# Patient Record
Sex: FEMALE | Race: WHITE | NOT HISPANIC OR LATINO | URBAN - METROPOLITAN AREA
[De-identification: names, ages, dates, MRNs, and addresses within clinical notes are randomized per-mention and may not be internally consistent; named-entity substitution may affect disease eponyms.]

---

## 2017-11-09 PROBLEM — M51.369 DDD (DEGENERATIVE DISC DISEASE), LUMBAR: Status: ACTIVE | Noted: 2017-11-09

## 2017-11-09 PROBLEM — M51.360 LUMBAR DISCOGENIC PAIN SYNDROME: Status: ACTIVE | Noted: 2017-11-09

## 2024-01-05 ENCOUNTER — HOSPITAL ENCOUNTER (OUTPATIENT)
Dept: ULTRASOUND IMAGING | Facility: HOSPITAL | Age: 44
Discharge: HOME | End: 2024-01-05
Admitting: NURSE PRACTITIONER
Payer: COMMERCIAL

## 2024-01-05 DIAGNOSIS — Z15.09 LYNCH SYNDROME: ICD-10-CM

## 2024-01-05 DIAGNOSIS — Z85.3 HISTORY OF LEFT BREAST CANCER: ICD-10-CM

## 2024-01-05 DIAGNOSIS — R94.8 ABNORMAL PET SCAN, MEDIASTINUM: ICD-10-CM

## 2024-01-05 DIAGNOSIS — R74.8 ELEVATED LIVER ENZYMES: ICD-10-CM

## 2024-01-05 PROCEDURE — 76705 ECHO EXAM OF ABDOMEN: CPT

## 2024-01-09 ENCOUNTER — TELEPHONE (OUTPATIENT)
Dept: GYNECOLOGIC ONCOLOGY | Facility: CLINIC | Age: 44
End: 2024-01-09
Payer: COMMERCIAL

## 2024-01-09 DIAGNOSIS — R74.8 ELEVATED LIVER ENZYMES: ICD-10-CM

## 2024-01-09 DIAGNOSIS — K76.0 HEPATIC STEATOSIS: Primary | ICD-10-CM

## 2024-01-09 NOTE — TELEPHONE ENCOUNTER
----- Message from SHARONA Macias sent at 1/9/2024 10:31 AM EST -----  Please notify patient that her liver u/s showed mild diffuse steatosis (fatty liver changes). This could explain her elevated liver enzymes. Good news is that no liver masses were seen to indicate any cancerous processes. I am placing a referral to GI for further evaluation and management. Thanks!

## 2024-01-09 NOTE — TELEPHONE ENCOUNTER
RN reviewed results of liver ultrasound with patient per APRN request. APRN placed a referral to GI for patient for follow up as the ultrasound showed fatty liver but not cancerous causes for elevated liver enzymes. Patient verbalized understanding.

## 2024-02-14 ENCOUNTER — OFFICE VISIT (OUTPATIENT)
Dept: GASTROENTEROLOGY | Facility: CLINIC | Age: 44
End: 2024-02-14
Payer: COMMERCIAL

## 2024-02-14 VITALS
HEART RATE: 80 BPM | BODY MASS INDEX: 34.07 KG/M2 | WEIGHT: 212 LBS | SYSTOLIC BLOOD PRESSURE: 120 MMHG | HEIGHT: 66 IN | DIASTOLIC BLOOD PRESSURE: 80 MMHG

## 2024-02-14 DIAGNOSIS — R74.8 ELEVATED LIVER ENZYMES: Primary | ICD-10-CM

## 2024-02-14 DIAGNOSIS — K76.0 FATTY INFILTRATION OF LIVER: ICD-10-CM

## 2024-02-14 DIAGNOSIS — E66.9 CLASS 1 OBESITY WITH SERIOUS COMORBIDITY AND BODY MASS INDEX (BMI) OF 34.0 TO 34.9 IN ADULT, UNSPECIFIED OBESITY TYPE: ICD-10-CM

## 2024-02-14 DIAGNOSIS — Z15.09 LYNCH SYNDROME: ICD-10-CM

## 2024-02-26 ENCOUNTER — OFFICE VISIT (OUTPATIENT)
Dept: NEUROLOGY | Facility: CLINIC | Age: 44
End: 2024-02-26
Payer: COMMERCIAL

## 2024-02-26 VITALS
BODY MASS INDEX: 33.43 KG/M2 | HEART RATE: 80 BPM | HEIGHT: 66 IN | OXYGEN SATURATION: 96 % | WEIGHT: 208 LBS | DIASTOLIC BLOOD PRESSURE: 76 MMHG | SYSTOLIC BLOOD PRESSURE: 108 MMHG

## 2024-02-26 DIAGNOSIS — G43.019 INTRACTABLE MIGRAINE WITHOUT AURA AND WITHOUT STATUS MIGRAINOSUS: Primary | ICD-10-CM

## 2024-02-26 PROCEDURE — 99213 OFFICE O/P EST LOW 20 MIN: CPT | Performed by: PSYCHIATRY & NEUROLOGY

## 2024-02-26 RX ORDER — SUMATRIPTAN 100 MG/1
TABLET, FILM COATED ORAL
Qty: 8 TABLET | Refills: 5 | Status: SHIPPED | OUTPATIENT
Start: 2024-02-26

## 2024-02-26 RX ORDER — NORTRIPTYLINE HYDROCHLORIDE 25 MG/1
25 CAPSULE ORAL NIGHTLY
Qty: 30 CAPSULE | Refills: 6 | Status: SHIPPED | OUTPATIENT
Start: 2024-02-26 | End: 2025-02-25

## 2024-02-26 NOTE — PROGRESS NOTES
Subjective:    CC: Edna Motley is seen today for headaches    HPI:  Current visit-patient states that she is still having about 8-15 headaches a month.  She has been having a lot of difficulties getting her Aimovig.  Had to use up her $5 coupon last month (only gets to a year).  Also continues to take nortriptyline 25 mg nightly and Imitrex as needed.    at her last visit I had given her a sample of Trudhesa for her month-long headache which helped.  Her headaches were well controlled up until a week ago as she has been sick with a sore throat.  Was  also unable to get her Aimovig shot due to insurance issues and is 1 week late.  Is compliant with nortriptyline 25 mg nightly which has improved her sleep.  She still takes 7 to 8 tablets of Imitrex a month.  Some of her headaches start off with neck pain but overall her myalgias have improved since she got chemo several years ago.      Last visit-patient states that she was doing extremely well in terms of her headaches up until a few weeks ago when she started to have a right-sided headache with pain behind her right eye.  Since then she has had a continuous headache despite taking sumatriptan.  She continues to take Aimovig shots monthly and nortriptyline 25 mg nightly.  Usually sleeps well at night except when she has hot flashes.     Last visit-patient started taking Aimovig 140 mg subcu q. monthly (which was improved however her co-pay remains high due to a high deductible).  She also switch from amitriptyline to nortriptyline 25 mg nightly.  Since making these changes her headaches have improved.  She also wakes up feeling refreshed.  She still has about 6-8 breakthrough headaches a month for which she takes Imitrex 100 mg.     Last visit-patient's Ajovy was also denied by insurance therefore we had switched her to Emgality however that was denied as well and we have been giving her samples each month.  She states that her headache frequency has worsened  and she is having about 6-10 severe headaches a month for which she takes Imitrex.  Continues to take amitriptyline 10 mg nightly as the higher dose was making her groggy during the daytime.  She also wakes up with a headache occasionally in the back of her head that can radiate to the front but it usually goes away with movement.    Last visit-patient has now started taking Ajovy as her Aimovig was denied again by her insurance.  She has only taken 1 shot of Ajovy thus far and her headache frequency has already improved to about 4-5 headaches last month.  She also continues to take amitriptyline 10 mg nightly which helps with her sleep.  She does wake up feeling slightly groggy.  Also continues to take Imitrex as needed.  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      Last visit-patient  had her MRI brain with and without contrast that was normal.  She also had a CT chest and CT abdomen but did not show any lesions/adenopathy.  Has been cancer free.  With regards to her headaches she was doing extremely well after she started amitriptyline 10 mg nightly however her insurance stopped paying for her Aimovig therefore she has not been able to get her injections for the past 2 months.  She is currently having over 8 headaches a month for which she takes Imitrex 100 mg.  Of note-I personally reviewed her MRI brain with and without contrast    Last visit- patient last saw me in June 2020.  She has been getting Aimovig monthly shots since then.  She states that initially the Aimovig helped tremendously and she was hardly having any headaches but over the past 4 months her headaches have again worsened in severity and frequency.  In the past when she has had about 15 or more headaches.  Her OB/GYN is also repeating a PET scan to see if she has any recurrence of cancer or metastasis (had double mastectomy for breast cancer 5 years ago and has also had hysterectomy with oophorectomy  several years ago for endometriosis).  She states she has also been having difficulty sleeping at night and is back to taking ibuprofen PM as melatonin was not helping.  Takes sumatriptan 100 mg for abortive treatment of her headaches but is running out now.    Last visit-since the last visit patient states that her headaches improved drastically after she got her Aimovig injection at her clinic.  She also reduced her over-the-counter medications and completed her dexamethasone taper pack.  However she was not able to get the Aimovig from her pharmacy and has not had any more injections as it was not approved by her insurance.  In the past month she had about 7 headaches for which she took either ibuprofen or Imitrex.    Last visit-this is a patient previously seen by Dr. Butt for headaches.  Patient also has a history of Massey syndrome with breast cancer and has had bilateral mastectomies followed by chemotherapy about 2 years ago.  At her last visit Dr. Ya had started her on amitriptyline however patient does not remember starting it.  She is currently on Effexor XR 75 mg at night for her headaches and hot flashes however it only controls the hot flashes but not the headaches.  In the past she has also tried Topamax and zonisamide both of which either caused cognitive side effects or did not help.  Patient states that she is currently getting a headache almost every day on waking up in the morning.  She also snores a little at night and is tired throughout the day.  The headache is mainly in the front and moves from one side to the other along with occasional blurred vision, photophobia and phonophobia but no nausea or vomiting.  She takes either Imitrex, Aleve or ibuprofen almost every day for the headaches.  Of note-I reviewed Dr. Butt's notes as follows-    Pt previously seen for Massey Syndrome, and when reassessed 10/7/15, it had been nearly two years since last evaluation. Family members  with colon, breast, uterine cancer, so tested and positive.      Also has migraines. Usually unilateral, either side, throbbing pain. No aura. Sometimes nausea, rarely vomiting, and has photophobia, prefers to go to bed with them. Has been treated with TPM, and uses imitrex, but uses ibuprofen or aleve first. Near daily. Whole adult life, at least 3-6/week. Worsening over time. Rarely a day or two without. TPM affected mood but was some help. No other preventives. Planned tx with ZNS.  2/16: off hormone patch for almost a month for upcoming evaluation, and having constant migraines, imitrex pill not working. Was doing well up until then. Has been taking the ZNS 50mg 4 at hs. No change in character of HA.  2/17: Has been dx with breast cancer, off hormone tx permanently. Dr Browne' notes reviewed.  Had bilateral mastectomy, couple more chemotherapy treatments, then planning reconstructive surgery. Has night sweats and hot flashes.  Had bad headaches for a while, but headaches now a lot better. Getting brief headaches couple times/week, that resolve easily with OTC med. If misses a dose of TPM, or doesn't have one can pop/day will get a headache. Same low back pain she's always had. Can radiate into right leg, not new.   3/18: has been off TPM a while, taking no daily prescription meds. Had a lot of migraines in January. Using imitrex as needed, continues to work well. Had never previously gone a month without migraines. Back pain still a problem, has seen various doctors for this. No focal weakness, except leg with pain can give way. Back is really troublesome. Sleeps poorly partly due to hot flashes.   Today: last breast cancer surgery was in January. Almost out of imitrex. Sometimes having to take second pill. Ran out of injections, which work well for a really bad migraine. Taking at least 2 pills/week. Didn't really like TPM due to cognitive side effects. Lots of hot flashes. Sleeps poorly at night, takes OTC  meds. No new weakness, numbness, vision changes, gait problems, difficulty with speech or swallowing.     The following portions of the patient's history were reviewed today and updated as of 02/27/2020  : allergies, current medications, past family history, past medical history, past social history, past surgical history and problem list  These document will be scanned to patient's chart.      Current Outpatient Medications:     nortriptyline (Pamelor) 25 MG capsule, Take 1 capsule by mouth Every Night., Disp: 30 capsule, Rfl: 6    SUMAtriptan (IMITREX) 100 MG tablet, TAKE 1 TABLET BY MOUTH AT THE ONSET OF MIGRAINE.  MAY REPEAT AFTER 2 HOURS, MAX 2 TABS PER 24 HOURS, Disp: 8 tablet, Rfl: 5    cyclobenzaprine (FLEXERIL) 5 MG tablet, Take 1 tablet by mouth At Night As Needed for Muscle Spasms., Disp: 30 tablet, Rfl: 3    Erenumab-aooe (AIMOVIG) 140 MG/ML auto-injector, Inject 1 mL under the skin into the appropriate area as directed Every 30 (Thirty) Days., Disp: 1 mL, Rfl: 11    estradiol (ESTRACE VAGINAL) 0.1 MG/GM vaginal cream, Insert 1/2 applicator per 2-3x per week at night, Disp: 42.5 g, Rfl: 5    Multiple Vitamins-Minerals (HAIR SKIN AND NAILS FORMULA) tablet, Take  by mouth Daily., Disp: , Rfl:     polyethylene glycol (MIRALAX) packet, Take 17 g by mouth As Needed., Disp: , Rfl:     venlafaxine (EFFEXOR) 75 MG tablet, Take 1 tablet by mouth 2 (Two) Times a Day., Disp: 60 tablet, Rfl: 11   Past Medical History:   Diagnosis Date    Cluster headache All my life    It’s like it get better with medicine but then comes back    Constipation     CTS (carpal tunnel syndrome) 2020    Depression Off and on    When I’m hurting more I get more down    Difficulty walking Worse back leg etc    Mostly mornings when I wake up or after sitting a long time    Endometriosis     When uterus removed was told had this    Generalized headaches     Headache, tension-type Everyday    Heart murmur     Hyperlipidemia 01/26/2023     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    Massey syndrome     Malignant neoplasm of lower-outer quadrant of left female breast 09/28/2016    Migraines 09/21/2016    Multiple gestation     I have a 13 and 15 year old    Osteoarthritis I think I have this    Pain  I believe checked    Osteoporosis Pain everywhere    Pelvic pain 09/21/2016    Wears glasses     Well woman exam with routine gynecological exam 10/25/2017      Past Surgical History:   Procedure Laterality Date    BREAST BIOPSY  9/22/16    Result cancer    BREAST RECONSTRUCTION, BREAST TISSUE EXPANDER INSERTION Bilateral 11/01/2016    Procedure: BILATERAL BREAST IMMEDIATE RECONSTRUCTION, BREAST TISSUE EXPANDER INSERTION;  Surgeon: Kenny Massey MD;  Location:  [facility];  Service:     BREAST RECONSTRUCTION, BREAST TISSUE EXPANDER REMOVAL, IMPLANT INSERTION Left 06/09/2017    Procedure: PLACEMENT OF LEFT RECONSTRUCTED BREAST TISSUE EXPANDER ;  Surgeon: Kenny Massey MD;  Location:  [facility];  Service:     BREAST SURGERY      COLONOSCOPY  10/14/2016    DILATION AND CURETTAGE, DIAGNOSTIC / THERAPEUTIC      x2    HYSTERECTOMY      MASTECTOMY W/ SENTINEL NODE BIOPSY Bilateral 11/01/2016    Procedure: BILATERAL BREAST MASTECTOMY WITH LEFT SENTINEL NODE BIOPSY POSS AXILLARY NODE DISECTION;  Surgeon: Jose F Johnson MD;  Location:  [facility];  Service:     OOPHORECTOMY      TOTAL ABDOMINAL HYSTERECTOMY WITH SALPINGO OOPHORECTOMY  09/01/2011    Endometriosis    UPPER GASTROINTESTINAL ENDOSCOPY  11/19/2014    VENOUS ACCESS DEVICE (PORT) REMOVAL Right 03/17/2017    WISDOM TOOTH EXTRACTION  08/01/1999    3 teeth removed      Family History   Problem Relation Age of Onset    Breast cancer Mother         Mom    Uterine cancer Mother         Mom    Migraines Mother         All the time from what I remember    Breast cancer Maternal Grandmother         Great grandmother    Colon cancer Maternal Grandmother         Believe she went to [facility]    Alzheimer's disease Other      "Cancer Other     Prostate cancer Paternal Grandfather         Went to [facility]      Social History     Socioeconomic History    Marital status:    Tobacco Use    Smoking status: Never     Passive exposure: Never    Smokeless tobacco: Never    Tobacco comments:     None ever   Vaping Use    Vaping Use: Never used   Substance and Sexual Activity    Alcohol use: Yes     Comment: Very seldom 3-4 times a year    Drug use: Never    Sexual activity: Yes     Partners: Male     Birth control/protection: Post-menopausal, None, Hysterectomy     Comment: Hurts / no hormones / we barely try anymore     Review of Systems   Neurological: Positive for headache.   All other systems reviewed and are negative.      Objective:    /76   Pulse 80   Ht 167.6 cm (66\")   Wt 94.3 kg (208 lb)   SpO2 96%   BMI 33.57 kg/m²     Neurology Exam: Reviewed and remains unchanged    General apperance: NAD.     Mental status: Alert, awake and oriented to time place and person.    Recent and Remote memory: Intact.    Attention span and Concentration: Normal.     Language and Speech: Intact- No dysarthria.    Fluency, Naming , Repitition and Comprehension:  Intact    Cranial Nerves:   CN II: Visual fields are full. Intact., Pupils - ALTHEA  CN III, IV and VI: Extraocular movements are intact. Normal saccades.   CN V: Facial sensation is intact.   CN VII: Muscles of facial expression reveal no asymmetry. Intact.   CN VIII: Hearing is intact. Whispered voice intact.   CN IX and X: Palate elevates symmetrically. Intact  CN XI: Shoulder shrug is intact.   CN XII: Tongue is midline without evidence of atrophy or fasciculation.     Ophthalmoscopic exam of optic disc- deferred    Motor:  Right UE muscle strength 5/5. Normal tone.     Left UE muscle strength 5/5. Normal tone.      Right LE muscle strength5/5. Normal tone.     Left LE muscle strength 5/5. Normal tone.      Sensory: Normal light touch, vibration and pinprick sensation " bilaterally.    DTRs: 2+ bilaterally in upper and lower extremities.    Babinski: Negative bilaterally.    Co-ordination: Normal finger-to-nose, heel to shin B/L.    Rhomberg: Negative.    Gait: Normal.    Cardiovascular: Regular rate and rhythm without murmur, gallop or rub.    Assessment and Plan:  1. Intractable migraine without aura and without status migrainosus  Patient most likely has a combination of migraine, tension type and medication rebound headaches.  In the past has tried Topamax, Botox, zonisamide, Effexor and amitriptyline.  Emgality and Ajovy were not approved  MRI brain was normal   I have told her to take her Aimovig this month but then stop it as she has had difficulties getting it from her insurance and it has not helped much either.  I will try to get Botox approved as she tried it in the past and found it helpful she should continue nortriptyline 25 mg nightly and sumatriptan as needed      Return in about 6 weeks (around 4/8/2024).         Sara Rocha MD

## 2024-03-11 ENCOUNTER — TELEPHONE (OUTPATIENT)
Dept: NEUROLOGY | Facility: CLINIC | Age: 44
End: 2024-03-11
Payer: COMMERCIAL

## 2024-03-11 NOTE — TELEPHONE ENCOUNTER
Provider: JULISSA    Caller: SUSANA    Phone Number: [phone]     Reason for Call: PT CALLED AND IS NEED TO RESCHEDULE BOTOX APPT ON 04/12/24.    THANK YOU

## 2024-04-19 ENCOUNTER — PROCEDURE VISIT (OUTPATIENT)
Dept: NEUROLOGY | Facility: CLINIC | Age: 44
End: 2024-04-19
Payer: COMMERCIAL

## 2024-04-19 VITALS — DIASTOLIC BLOOD PRESSURE: 89 MMHG | SYSTOLIC BLOOD PRESSURE: 130 MMHG

## 2024-04-19 DIAGNOSIS — G43.019 INTRACTABLE MIGRAINE WITHOUT AURA AND WITHOUT STATUS MIGRAINOSUS: Primary | ICD-10-CM

## 2024-04-19 RX ORDER — SUMATRIPTAN 100 MG/1
TABLET, FILM COATED ORAL
Qty: 8 TABLET | Refills: 5 | Status: SHIPPED | OUTPATIENT
Start: 2024-04-19

## 2024-04-19 NOTE — PROGRESS NOTES
Botox Procedure Note-    Current headache frequency: 20-25__ headaches days / month (about one a week).     The risks and benefits were discussed with the patient. The patient was given the opportunity to ask questions. Informed consent form was signed.     Onabotulinumtoxin A was reconstituted with 0.9% normal saline. All injections sites were prepped with alcohol swab. Approximately 5 units of botox were injected into each of the following sites  as per routine migraine botox injection PREEMPT protocol:  (2 injections), procerus (1 injection), frontalis (4 injections), temporalis (8 injections), occipitalis (6 injections) cervical, upper cervical paraspinals (4 injections), and trapezius (6 injections) for a total of 31 sites.  The patient tolerated the procedure well. There were no immediate complications. The patient will follow up in approximately 12 weeks for her next injection.     Total amount of onabotulinumtoxin A injected was 155 units with 45 units wasted.     Additional notes: Patient is here for her first Botox cycle.  She stopped taking Aimovig shots (as they were not helping and also due to insurance issues).  She does continue to take nortriptyline 25 mg nightly however her headaches have been extremely bad in the past month as she has had a near daily headache.  Sleeps well at night though with nortriptyline and is reluctant to increase the dose yet.  Continues to take sumatriptan as needed.

## 2024-04-22 ENCOUNTER — PRIOR AUTHORIZATION (OUTPATIENT)
Dept: NEUROLOGY | Facility: CLINIC | Age: 44
End: 2024-04-22
Payer: COMMERCIAL

## 2024-04-22 NOTE — TELEPHONE ENCOUNTER
DONE AUTH ONLINE THROUGH [employer] [place]    BOTOX 200 UNITS      [employer]UR[employer]  [employer] [place]  [id number]    APPROVED  AUTH:[id number]  DATES:04/17/2024-04/17/2025    BOTOX WILL BE THROUGH MEDICAL BENEFIT       BUY AND BILL

## 2024-05-06 ENCOUNTER — TELEPHONE (OUTPATIENT)
Dept: NEUROLOGY | Facility: CLINIC | Age: 44
End: 2024-05-06
Payer: COMMERCIAL

## 2024-05-07 RX ORDER — METHYLPREDNISOLONE 4 MG/1
TABLET ORAL
Qty: 1 EACH | Refills: 0 | Status: SHIPPED | OUTPATIENT
Start: 2024-05-07

## 2024-05-21 ENCOUNTER — SPECIALTY PHARMACY (OUTPATIENT)
Dept: NEUROLOGY | Facility: CLINIC | Age: 44
End: 2024-05-21
Payer: COMMERCIAL

## 2024-06-11 RX ORDER — ZAVEGEPANT 10 MG/.1ML
10 SPRAY NASAL AS NEEDED
Qty: 5 EACH | Refills: 3 | Status: SHIPPED | OUTPATIENT
Start: 2024-06-11

## 2024-06-11 RX ORDER — METHYLPREDNISOLONE 4 MG/1
TABLET ORAL
Qty: 1 EACH | Refills: 0 | Status: SHIPPED | OUTPATIENT
Start: 2024-06-11

## 2024-06-17 ENCOUNTER — TELEPHONE (OUTPATIENT)
Dept: NEUROLOGY | Facility: CLINIC | Age: 44
End: 2024-06-17
Payer: COMMERCIAL

## 2024-06-17 NOTE — TELEPHONE ENCOUNTER
Provider: DR. COSTA    Caller: Edna Motley    Relationship to Patient: Self    Pharmacy: [id number] - [place], [place] - [address] [facility] [address] - [phone] [facility] [phone] FX    Phone Number: [phone]    Reason for Call: PT STATES HER MIGRAINE IS STILL ONGOING; NASAL SPRAY & STEROID PACK HAVE NOT PROVIDED ANY RELIEF.    When was the patient last seen: 4/19/2024 FOR BOTOX    When is the patient's next appointment: NO FUTURE CONSULT VISITS; BOTOX ON 7/19/2024    When did it start: A LITTLE OVER A WEEK AGO    Where is it located: FOREHEAD & TEMPLES (BILATERAL); NECK REGION, AS WELL    Characteristics of symptom/severity: SHARP, SHOOTING; RATES A 10/10 ON PAIN SCALE.    Timing- Is it constant or intermittent: CONSTANT  What therapies/medications have you tried: SUMATRIPTAN HELPS TO ALLEVIATE THE MIGRAINE TEMPORARILY; MEDROL DOSE PACK AND ZAVZPRET HAVE NO HELPED AT ALL TO ALLEVIATE THE PAIN.    PLEASE REVIEW AND ADVISE.

## 2024-06-18 RX ORDER — DEXAMETHASONE 1 MG
TABLET ORAL
Qty: 20 TABLET | Refills: 0 | Status: SHIPPED | OUTPATIENT
Start: 2024-06-18

## 2024-06-26 ENCOUNTER — HOSPITAL ENCOUNTER (INPATIENT)
Facility: HOSPITAL | Age: 44
LOS: 4 days | Discharge: HOME | DRG: 299 | End: 2024-06-30
Attending: EMERGENCY MEDICINE | Admitting: INTERNAL MEDICINE
Payer: COMMERCIAL

## 2024-06-26 ENCOUNTER — APPOINTMENT (OUTPATIENT)
Dept: CT IMAGING | Facility: HOSPITAL | Age: 44
DRG: 299 | End: 2024-06-26
Payer: COMMERCIAL

## 2024-06-26 ENCOUNTER — APPOINTMENT (OUTPATIENT)
Dept: GENERAL RADIOLOGY | Facility: HOSPITAL | Age: 44
DRG: 299 | End: 2024-06-26
Payer: COMMERCIAL

## 2024-06-26 ENCOUNTER — APPOINTMENT (OUTPATIENT)
Dept: MRI IMAGING | Facility: HOSPITAL | Age: 44
DRG: 299 | End: 2024-06-26
Payer: COMMERCIAL

## 2024-06-26 DIAGNOSIS — I77.74 DISSECTION OF EXTRACRANIAL VERTEBRAL ARTERY: Primary | ICD-10-CM

## 2024-06-26 LAB
ALBUMIN SERPL-MCNC: 4.1 G/DL (ref 3.5–5.2)
ALBUMIN/GLOB SERPL: 1.2 G/DL
ALP SERPL-CCNC: 90 U/L (ref 39–117)
ALT SERPL W P-5'-P-CCNC: 29 U/L (ref 1–33)
ANION GAP SERPL CALCULATED.3IONS-SCNC: 11 MMOL/L (ref 5–15)
APTT PPP: 22.2 SECONDS (ref 60–90)
AST SERPL-CCNC: 18 U/L (ref 1–32)
BACTERIA UR QL AUTO: NORMAL /HPF
BASOPHILS # BLD AUTO: 0.03 10*3/MM3 (ref 0–0.2)
BASOPHILS NFR BLD AUTO: 0.3 % (ref 0–1.5)
BILIRUB SERPL-MCNC: 0.5 MG/DL (ref 0–1.2)
BILIRUB UR QL STRIP: NEGATIVE
BUN SERPL-MCNC: 13 MG/DL (ref 6–20)
BUN/CREAT SERPL: 14.8 (ref 7–25)
CALCIUM SPEC-SCNC: 9.3 MG/DL (ref 8.6–10.5)
CHLORIDE SERPL-SCNC: 100 MMOL/L (ref 98–107)
CLARITY UR: CLEAR
CO2 SERPL-SCNC: 27 MMOL/L (ref 22–29)
COLOR UR: YELLOW
CREAT SERPL-MCNC: 0.88 MG/DL (ref 0.57–1)
DEPRECATED RDW RBC AUTO: 39.1 FL (ref 37–54)
EGFRCR SERPLBLD CKD-EPI 2021: 83.7 ML/MIN/1.73
EOSINOPHIL # BLD AUTO: 0.03 10*3/MM3 (ref 0–0.4)
EOSINOPHIL NFR BLD AUTO: 0.3 % (ref 0.3–6.2)
ERYTHROCYTE [DISTWIDTH] IN BLOOD BY AUTOMATED COUNT: 11.9 % (ref 12.3–15.4)
GLOBULIN UR ELPH-MCNC: 3.4 GM/DL
GLUCOSE BLDC GLUCOMTR-MCNC: 94 MG/DL (ref 70–130)
GLUCOSE SERPL-MCNC: 120 MG/DL (ref 65–99)
GLUCOSE UR STRIP-MCNC: NEGATIVE MG/DL
HCT VFR BLD AUTO: 46.5 % (ref 34–46.6)
HGB BLD-MCNC: 15.9 G/DL (ref 12–15.9)
HGB UR QL STRIP.AUTO: NEGATIVE
HOLD SPECIMEN: NORMAL
HYALINE CASTS UR QL AUTO: NORMAL /LPF
IMM GRANULOCYTES # BLD AUTO: 0.08 10*3/MM3 (ref 0–0.05)
IMM GRANULOCYTES NFR BLD AUTO: 0.8 % (ref 0–0.5)
INR PPP: 0.96 (ref 0.89–1.12)
KETONES UR QL STRIP: NEGATIVE
LEUKOCYTE ESTERASE UR QL STRIP.AUTO: NEGATIVE
LYMPHOCYTES # BLD AUTO: 2.45 10*3/MM3 (ref 0.7–3.1)
LYMPHOCYTES NFR BLD AUTO: 23 % (ref 19.6–45.3)
MAGNESIUM SERPL-MCNC: 2.6 MG/DL (ref 1.6–2.6)
MCH RBC QN AUTO: 31.1 PG (ref 26.6–33)
MCHC RBC AUTO-ENTMCNC: 34.2 G/DL (ref 31.5–35.7)
MCV RBC AUTO: 90.8 FL (ref 79–97)
MONOCYTES # BLD AUTO: 0.73 10*3/MM3 (ref 0.1–0.9)
MONOCYTES NFR BLD AUTO: 6.9 % (ref 5–12)
NEUTROPHILS NFR BLD AUTO: 68.7 % (ref 42.7–76)
NEUTROPHILS NFR BLD AUTO: 7.31 10*3/MM3 (ref 1.7–7)
NITRITE UR QL STRIP: NEGATIVE
NRBC BLD AUTO-RTO: 0 /100 WBC (ref 0–0.2)
PH UR STRIP.AUTO: 6 [PH] (ref 5–8)
PLATELET # BLD AUTO: 254 10*3/MM3 (ref 140–450)
PMV BLD AUTO: 10.6 FL (ref 6–12)
POTASSIUM SERPL-SCNC: 3.8 MMOL/L (ref 3.5–5.2)
PROT SERPL-MCNC: 7.5 G/DL (ref 6–8.5)
PROT UR QL STRIP: ABNORMAL
PROTHROMBIN TIME: 12.9 SECONDS (ref 12.2–14.5)
QT INTERVAL: 410 MS
QTC INTERVAL: 426 MS
RBC # BLD AUTO: 5.12 10*6/MM3 (ref 3.77–5.28)
RBC # UR STRIP: NORMAL /HPF
REF LAB TEST METHOD: NORMAL
SODIUM SERPL-SCNC: 138 MMOL/L (ref 136–145)
SP GR UR STRIP: 1.02 (ref 1–1.03)
SQUAMOUS #/AREA URNS HPF: NORMAL /HPF
TROPONIN T SERPL HS-MCNC: <6 NG/L
TSH SERPL DL<=0.05 MIU/L-ACNC: 1.51 UIU/ML (ref 0.27–4.2)
UFH PPP CHRO-ACNC: 0.1 IU/ML (ref 0.3–0.7)
UROBILINOGEN UR QL STRIP: ABNORMAL
WBC # UR STRIP: NORMAL /HPF
WBC NRBC COR # BLD AUTO: 10.63 10*3/MM3 (ref 3.4–10.8)
WHOLE BLOOD HOLD COAG: NORMAL
WHOLE BLOOD HOLD SPECIMEN: NORMAL

## 2024-06-26 PROCEDURE — 25010000002 PROCHLORPERAZINE 10 MG/2ML SOLUTION: Performed by: EMERGENCY MEDICINE

## 2024-06-26 PROCEDURE — 93005 ELECTROCARDIOGRAM TRACING: CPT | Performed by: EMERGENCY MEDICINE

## 2024-06-26 PROCEDURE — 84484 ASSAY OF TROPONIN QUANT: CPT | Performed by: EMERGENCY MEDICINE

## 2024-06-26 PROCEDURE — 99291 CRITICAL CARE FIRST HOUR: CPT

## 2024-06-26 PROCEDURE — 99222 1ST HOSP IP/OBS MODERATE 55: CPT | Performed by: NURSE PRACTITIONER

## 2024-06-26 PROCEDURE — 85520 HEPARIN ASSAY: CPT | Performed by: EMERGENCY MEDICINE

## 2024-06-26 PROCEDURE — 25510000001 IOPAMIDOL PER 1 ML: Performed by: EMERGENCY MEDICINE

## 2024-06-26 PROCEDURE — 70496 CT ANGIOGRAPHY HEAD: CPT

## 2024-06-26 PROCEDURE — 81001 URINALYSIS AUTO W/SCOPE: CPT | Performed by: EMERGENCY MEDICINE

## 2024-06-26 PROCEDURE — 83735 ASSAY OF MAGNESIUM: CPT | Performed by: EMERGENCY MEDICINE

## 2024-06-26 PROCEDURE — 71045 X-RAY EXAM CHEST 1 VIEW: CPT

## 2024-06-26 PROCEDURE — 70498 CT ANGIOGRAPHY NECK: CPT

## 2024-06-26 PROCEDURE — 93005 ELECTROCARDIOGRAM TRACING: CPT

## 2024-06-26 PROCEDURE — 85610 PROTHROMBIN TIME: CPT | Performed by: EMERGENCY MEDICINE

## 2024-06-26 PROCEDURE — 25010000002 HEPARIN (PORCINE) 25000-0.45 UT/250ML-% SOLUTION: Performed by: EMERGENCY MEDICINE

## 2024-06-26 PROCEDURE — 70450 CT HEAD/BRAIN W/O DYE: CPT

## 2024-06-26 PROCEDURE — 84295 ASSAY OF SERUM SODIUM: CPT | Performed by: NURSE PRACTITIONER

## 2024-06-26 PROCEDURE — 99291 CRITICAL CARE FIRST HOUR: CPT | Performed by: INTERNAL MEDICINE

## 2024-06-26 PROCEDURE — 80050 GENERAL HEALTH PANEL: CPT | Performed by: EMERGENCY MEDICINE

## 2024-06-26 PROCEDURE — 25810000003 SODIUM CHLORIDE 0.9 % SOLUTION: Performed by: EMERGENCY MEDICINE

## 2024-06-26 PROCEDURE — 25010000002 MAGNESIUM SULFATE 2 GM/50ML SOLUTION: Performed by: NURSE PRACTITIONER

## 2024-06-26 PROCEDURE — 85730 THROMBOPLASTIN TIME PARTIAL: CPT | Performed by: EMERGENCY MEDICINE

## 2024-06-26 PROCEDURE — 70551 MRI BRAIN STEM W/O DYE: CPT

## 2024-06-26 PROCEDURE — 82948 REAGENT STRIP/BLOOD GLUCOSE: CPT

## 2024-06-26 PROCEDURE — 36415 COLL VENOUS BLD VENIPUNCTURE: CPT

## 2024-06-26 RX ORDER — BISACODYL 5 MG/1
5 TABLET, DELAYED RELEASE ORAL DAILY PRN
Status: DISCONTINUED | OUTPATIENT
Start: 2024-06-26 | End: 2024-06-30 | Stop reason: HOSPADM

## 2024-06-26 RX ORDER — SODIUM CHLORIDE 0.9 % (FLUSH) 0.9 %
10 SYRINGE (ML) INJECTION AS NEEDED
Status: DISCONTINUED | OUTPATIENT
Start: 2024-06-26 | End: 2024-06-27

## 2024-06-26 RX ORDER — HEPARIN SODIUM 1000 [USP'U]/ML
2000 INJECTION, SOLUTION INTRAVENOUS; SUBCUTANEOUS AS NEEDED
Status: DISCONTINUED | OUTPATIENT
Start: 2024-06-26 | End: 2024-06-26

## 2024-06-26 RX ORDER — POLYETHYLENE GLYCOL 3350 17 G/17G
17 POWDER, FOR SOLUTION ORAL DAILY PRN
Status: DISCONTINUED | OUTPATIENT
Start: 2024-06-26 | End: 2024-06-30 | Stop reason: HOSPADM

## 2024-06-26 RX ORDER — AMOXICILLIN 250 MG
2 CAPSULE ORAL 2 TIMES DAILY
Status: DISCONTINUED | OUTPATIENT
Start: 2024-06-26 | End: 2024-06-30 | Stop reason: HOSPADM

## 2024-06-26 RX ORDER — PROCHLORPERAZINE EDISYLATE 5 MG/ML
10 INJECTION INTRAMUSCULAR; INTRAVENOUS ONCE
Status: COMPLETED | OUTPATIENT
Start: 2024-06-26 | End: 2024-06-26

## 2024-06-26 RX ORDER — MAGNESIUM SULFATE HEPTAHYDRATE 40 MG/ML
2 INJECTION, SOLUTION INTRAVENOUS ONCE
Status: COMPLETED | OUTPATIENT
Start: 2024-06-26 | End: 2024-06-26

## 2024-06-26 RX ORDER — PANTOPRAZOLE SODIUM 40 MG/10ML
40 INJECTION, POWDER, LYOPHILIZED, FOR SOLUTION INTRAVENOUS
Status: DISCONTINUED | OUTPATIENT
Start: 2024-06-27 | End: 2024-06-29

## 2024-06-26 RX ORDER — HEPARIN SODIUM 1000 [USP'U]/ML
4000 INJECTION, SOLUTION INTRAVENOUS; SUBCUTANEOUS AS NEEDED
Status: DISCONTINUED | OUTPATIENT
Start: 2024-06-26 | End: 2024-06-26

## 2024-06-26 RX ORDER — BISACODYL 10 MG
10 SUPPOSITORY, RECTAL RECTAL DAILY PRN
Status: DISCONTINUED | OUTPATIENT
Start: 2024-06-26 | End: 2024-06-30 | Stop reason: HOSPADM

## 2024-06-26 RX ORDER — HEPARIN SODIUM 10000 [USP'U]/100ML
11 INJECTION, SOLUTION INTRAVENOUS
Status: DISCONTINUED | OUTPATIENT
Start: 2024-06-26 | End: 2024-06-29

## 2024-06-26 RX ORDER — ACETAMINOPHEN 500 MG
1000 TABLET ORAL EVERY 6 HOURS PRN
Status: DISCONTINUED | OUTPATIENT
Start: 2024-06-26 | End: 2024-06-30 | Stop reason: HOSPADM

## 2024-06-26 RX ORDER — ATORVASTATIN CALCIUM 40 MG/1
40 TABLET, FILM COATED ORAL NIGHTLY
Status: DISCONTINUED | OUTPATIENT
Start: 2024-06-26 | End: 2024-06-30 | Stop reason: HOSPADM

## 2024-06-26 RX ORDER — SODIUM CHLORIDE 0.9 % (FLUSH) 0.9 %
10 SYRINGE (ML) INJECTION EVERY 12 HOURS SCHEDULED
Status: DISCONTINUED | OUTPATIENT
Start: 2024-06-26 | End: 2024-06-27

## 2024-06-26 RX ORDER — NITROGLYCERIN 0.4 MG/1
0.4 TABLET SUBLINGUAL
Status: DISCONTINUED | OUTPATIENT
Start: 2024-06-26 | End: 2024-06-30 | Stop reason: HOSPADM

## 2024-06-26 RX ORDER — SODIUM CHLORIDE 0.9 % (FLUSH) 0.9 %
10 SYRINGE (ML) INJECTION EVERY 12 HOURS SCHEDULED
Status: DISCONTINUED | OUTPATIENT
Start: 2024-06-26 | End: 2024-06-30 | Stop reason: HOSPADM

## 2024-06-26 RX ORDER — SODIUM CHLORIDE 0.9 % (FLUSH) 0.9 %
10 SYRINGE (ML) INJECTION AS NEEDED
Status: DISCONTINUED | OUTPATIENT
Start: 2024-06-26 | End: 2024-06-30 | Stop reason: HOSPADM

## 2024-06-26 RX ORDER — SODIUM CHLORIDE 9 MG/ML
40 INJECTION, SOLUTION INTRAVENOUS AS NEEDED
Status: DISCONTINUED | OUTPATIENT
Start: 2024-06-26 | End: 2024-06-27

## 2024-06-26 RX ORDER — SODIUM CHLORIDE 9 MG/ML
40 INJECTION, SOLUTION INTRAVENOUS AS NEEDED
Status: DISCONTINUED | OUTPATIENT
Start: 2024-06-26 | End: 2024-06-30 | Stop reason: HOSPADM

## 2024-06-26 RX ORDER — HEPARIN SODIUM 1000 [USP'U]/ML
4000 INJECTION, SOLUTION INTRAVENOUS; SUBCUTANEOUS ONCE
Status: DISCONTINUED | OUTPATIENT
Start: 2024-06-26 | End: 2024-06-26

## 2024-06-26 RX ADMIN — SODIUM CHLORIDE 1000 ML: 9 INJECTION, SOLUTION INTRAVENOUS at 16:55

## 2024-06-26 RX ADMIN — ATORVASTATIN CALCIUM 40 MG: 40 TABLET, FILM COATED ORAL at 21:40

## 2024-06-26 RX ADMIN — MAGNESIUM SULFATE HEPTAHYDRATE 2 G: 2 INJECTION, SOLUTION INTRAVENOUS at 22:49

## 2024-06-26 RX ADMIN — PROCHLORPERAZINE EDISYLATE 10 MG: 5 INJECTION INTRAMUSCULAR; INTRAVENOUS at 16:56

## 2024-06-26 RX ADMIN — SENNOSIDES AND DOCUSATE SODIUM 2 TABLET: 50; 8.6 TABLET ORAL at 21:40

## 2024-06-26 RX ADMIN — IOPAMIDOL 85 ML: 755 INJECTION, SOLUTION INTRAVENOUS at 17:04

## 2024-06-26 RX ADMIN — Medication 10 ML: at 21:40

## 2024-06-26 RX ADMIN — ACETAMINOPHEN 1000 MG: 500 TABLET, FILM COATED ORAL at 21:40

## 2024-06-26 RX ADMIN — HEPARIN SODIUM 11 UNITS/KG/HR: 10000 INJECTION, SOLUTION INTRAVENOUS at 18:10

## 2024-06-26 NOTE — ED PROVIDER NOTES
Subjective   History of Present Illness  This is a 43-year-old female with past medical history of migraines presenting to the emergency department with some dizziness, nausea and vomiting.  The patient states that she was at a chiropractor appointment.  She was getting her neck adjusted when she acutely got very dizzy.  She got very nauseous and diaphoretic.  She proceeded to vomit.  Patient states that she has never had symptoms like this before.  EMS was called at the time and sent to the emergency department for evaluation.  Patient states that she is feeling slightly better at this time.  She still feels lightheaded.  No vertigo.  She is also nauseous.  He is not actively vomiting at this time.  Patient complaining of some pain bilaterally in her neck.  She denies any headache or change in vision.  No focal weakness.  No chest pain or shortness of breath.  No abdominal pain    History provided by:  Patient and EMS personnel   used: No        Review of Systems   Constitutional:  Positive for diaphoresis. Negative for chills and fever.   HENT:  Negative for congestion, ear pain and sore throat.    Eyes:  Negative for visual disturbance.   Respiratory:  Negative for shortness of breath.    Cardiovascular:  Negative for chest pain.   Gastrointestinal:  Positive for nausea and vomiting. Negative for abdominal pain.   Genitourinary:  Negative for difficulty urinating.   Musculoskeletal:  Negative for arthralgias.   Skin:  Negative for rash.   Neurological:  Positive for dizziness and light-headedness. Negative for weakness and numbness.   Psychiatric/Behavioral:  Negative for agitation.        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    Massey syndrome     Malignant neoplasm of lower-outer quadrant of left female breast 09/28/2016    Migraines 09/21/2016    Multiple gestation     I have a 13 and 15 year old    Osteoarthritis I think I have this    Pain dr I believe checked    Osteoporosis Pain everywhere    Pelvic pain 09/21/2016    Wears glasses     Well woman exam with routine gynecological exam 10/25/2017       Allergies   Allergen Reactions    Penicillins GI Intolerance       Past Surgical History:   Procedure Laterality Date    BREAST BIOPSY  9/22/16    Result cancer    BREAST RECONSTRUCTION, BREAST TISSUE EXPANDER INSERTION Bilateral 11/01/2016    Procedure: BILATERAL BREAST IMMEDIATE RECONSTRUCTION, BREAST TISSUE EXPANDER INSERTION;  Surgeon: Kenny Massey MD;  Location:  [facility];  Service:     BREAST RECONSTRUCTION, BREAST TISSUE EXPANDER REMOVAL, IMPLANT INSERTION Left 06/09/2017    Procedure: PLACEMENT OF LEFT RECONSTRUCTED BREAST TISSUE EXPANDER ;  Surgeon: Kenny Massey MD;  Location:  [facility];  Service:     BREAST SURGERY      COLONOSCOPY  10/14/2016    DILATION AND CURETTAGE, DIAGNOSTIC / THERAPEUTIC      x2    HYSTERECTOMY      MASTECTOMY W/ SENTINEL NODE BIOPSY Bilateral 11/01/2016    Procedure: BILATERAL BREAST MASTECTOMY WITH LEFT SENTINEL NODE BIOPSY POSS AXILLARY NODE DISECTION;  Surgeon: Jose F Johnson MD;  Location:  [facility];  Service:     OOPHORECTOMY      TOTAL ABDOMINAL HYSTERECTOMY WITH SALPINGO OOPHORECTOMY  09/01/2011    Endometriosis    UPPER GASTROINTESTINAL ENDOSCOPY  11/19/2014    VENOUS ACCESS DEVICE (PORT) REMOVAL Right 03/17/2017    WISDOM TOOTH EXTRACTION  08/01/1999    3 teeth removed       Family History   Problem Relation Age of Onset    Breast  cancer Mother         Mom    Uterine cancer Mother         Mom    Migraines Mother         All the time from what I remember    Breast cancer Maternal Grandmother         Great grandmother    Colon cancer Maternal Grandmother         Believe she went to [facility]    Alzheimer's disease Other     Cancer Other     Prostate cancer Paternal Grandfather         Went to [facility]       Social History     Socioeconomic History    Marital status:    Tobacco Use    Smoking status: Never     Passive exposure: Never    Smokeless tobacco: Never    Tobacco comments:     None ever   Vaping Use    Vaping status: Never Used   Substance and Sexual Activity    Alcohol use: Yes     Comment: Very seldom 3-4 times a year    Drug use: Never    Sexual activity: Yes     Partners: Male     Birth control/protection: Post-menopausal, None, Hysterectomy     Comment: Hurts / no hormones / we barely try anymore           Objective   Physical Exam  Vitals and nursing note reviewed.   Constitutional:       General: She is not in acute distress.     Appearance: She is not ill-appearing or toxic-appearing.   HENT:      Mouth/Throat:      Pharynx: No posterior oropharyngeal erythema.   Eyes:      Conjunctiva/sclera: Conjunctivae normal.      Pupils: Pupils are equal, round, and reactive to light.   Cardiovascular:      Rate and Rhythm: Normal rate and regular rhythm.   Pulmonary:      Effort: Pulmonary effort is normal. No respiratory distress.   Abdominal:      General: Abdomen is flat. There is no distension.      Palpations: There is no mass.      Tenderness: There is no abdominal tenderness. There is no guarding or rebound.   Musculoskeletal:         General: No deformity. Normal range of motion.   Skin:     General: Skin is warm.      Findings: No rash.   Neurological:      General: No focal deficit present.      Mental Status: She is alert and oriented to person, place, and time.      Motor: No weakness.         ECG 12 Lead      Date/Time:  6/26/2024 4:41 PM    Performed by: Paddy Hunt MD  Authorized by: Paddy Hunt MD  Interpreted by ED physician  Comparison: compared with previous ECG   Similar to previous ECG  Rhythm: sinus rhythm  Rate: normal  BPM: 65  QRS axis: normal  Conduction: conduction normal  Other findings comments: Nonspecific ST changes  Clinical impression: non-specific ECG  Comments: Interpretation:  EKG was directly visualized by myself, interpretations as documented in hospital course.               ED Course  ED Course as of 06/26/24 1753 Wed Jun 26, 2024   1642 BP: 139/94 [JK]   1642 Temp: 98 °F (36.7 °C) [JK]   1642 Temp src: Oral [JK]   1642 Heart Rate: 62 [JK]   1642 Resp: 18 [JK]   1642 SpO2: 97 %  Interpretation:  Patient's repeat vitals, telemetry tracing, and pulse oximetry tracing were directly viewed and interpreted by myself.  Normal sinus rhythm [JK]   1750 Urinalysis With Microscopic If Indicated (No Culture) - Urine, Clean Catch(!) [JK]   1750 Urinalysis, Microscopic Only - Urine, Clean Catch [JK]   1750 TSH [JK]   1750 Single High Sensitivity Troponin T [JK]   1750 Magnesium [JK]   1750 CBC & Differential(!) [JK]   1750 Comprehensive Metabolic Panel(!)  Interpretation:  Laboratory studies were reviewed and interpreted directly by myself.  Urinalysis was unremarkable, CMP normal, CBC normal, magnesium normal, troponin normal, TSH unremarkable [JK]   1750 XR Chest 1 View [JK]   1750 CT Angiogram Neck [JK]   1750 CT Angiogram Head  Interpretation:  Imaging was directly visualized by myself, per my interpretations, chest x-ray was unremarkable.  CT angiogram of the head and neck showed dissection of the vertebral and basilar arteries. [JK]   1751 Patient has findings consistent with vertebrobasilar artery dissection.  Repeat neuroexam does not show any deficit at this time.  Blood pressure appears stable.  I did speak with on-call neurosurgery regarding the patient.  They will be coming to evaluate  her.  We did start the patient on nicardipine to maintain systolic blood pressure less than 140, patient was also started on heparin.  MRI will be obtained [JK]   1751 Stroke team was also notified.  They are at bedside evaluating the patient. [JK]   1751 Patient's blood pressure remained stable.  Patient's overall findings deemed critical.  We will continue IV infusions as previously discussed.  Neurosurgery and stroke team continue to follow.  Patient admitted to the ICU in critical condition [JK]   1753 Based on the patient's presentation, history and diffuse work-up in the emergency department, the patient is deemed appropriate for admission to the hospital for further evaluation and treatment.  This was discussed with the patient at bedside.  They are in agreement with the current medical management.    Admitting physician: Dr. Chan    Discussion was had with admitting physician regarding the laboratory and imaging findings.  We did discuss current therapeutics in the emergency department and progression of the patient.  Working diagnosis was conveyed to the admitting physician, as well as current status and prognosis for the patient.  They are in agreement with these findings and have accepted admission.    Shared decision making:   After full review of the patient's clinical presentation, review of any work-up including but not limited to laboratory studies and radiology obtained, I had a discussion with the patient.  Treatment options were discussed as well as the risks, benefits and consequences.  I discussed all findings with the patient and family members if available.  During the discussion, treatment goals were understood by all as well as any misconceptions which were addressed with the patient.  Ample time was given for any questions they may have had.  They are in agreement with the treatment plan as well as final disposition. [JK]      ED Course User Index  [JK] Paddy Hunt MD                                              Medical Decision Making  This is a 43-year-old female presenting with subacute onset neck pain, dizziness, diaphoresis and vomiting.  Patient was at a chiropractor getting adjusted when symptoms occurred.  Main concern is for carotid dissection given the manipulation.  The patient does not have any focal neurologic deficit at this time.  NIH stroke scale is 0.  Symptoms could also be secondary to vasovagal episode from the manipulation..  Overall, the patient is nontoxic.  Afebrile.  IV access was established and the patient.  Placed on continuous telemetry monitoring.  Given the patient's presentation, differential is broad and will require further evaluation.  Workup initiated.      Differential diagnosis: Near syncope, vasovagal episode, dysrhythmia, carotid dissection, anemia      Amount and/or Complexity of Data Reviewed  Independent Historian: EMS  External Data Reviewed: labs, radiology, ECG and notes.     Details: External laboratories, imaging as well as notes were reviewed personally by myself.  All relevant studies were used to guide decision making.     Date of previous record: 4/16/2024    Source of note: Primary physician    Summary: Patient was seen evaluate for routine visit and sleep apnea.  I did review basic laboratory studies on file as well as a previous chest x-ray and EKG.  Records reviewed    Labs: ordered. Decision-making details documented in ED Course.  Radiology: ordered and independent interpretation performed. Decision-making details documented in ED Course.  ECG/medicine tests: ordered and independent interpretation performed. Decision-making details documented in ED Course.    Risk  OTC drugs.  Prescription drug management.    Critical Care  Total time providing critical care: 45 minutes (Authorized and performed by: Paddy Hunt MD  I personally spent a total of 45 minutes of critical care time with the patient.  Due to the high probability of  clinically significant, life-threatening deterioration, the patient required my highest level of care to intervene emergently.  These interventions, including, but not limited to, establishing IV access, continuous pulse oximeter and telemetry monitoring, frequent monitoring and reevaluations, management the patient's airway and cardiovascular system, discussion with other consultants as needed, which bear directly on the management the patient.  This also includes obtaining history, examining the patient, frequent reevaluations and coordinating high level of care.  Failure to emergently initiate these interventions would carry a high probability of resulting in sudden, clinically significant or life threatening deterioration in the patient's condition.  This does not include time spent on separately reported billable procedures.)        Final diagnoses:   Dissection of extracranial vertebral artery       ED Disposition  ED Disposition       ED Disposition   Decision to Admit    Condition   --    Comment   Level of Care: Critical Care [6]   Admitting Physician: JASON PAINTER [[id number]]   Attending Physician: JASON PAINTER [[id number]]                 No follow-up provider specified.       Medication List      No changes were made to your prescriptions during this visit.            Paddy Hunt MD  06/26/24 [id number]

## 2024-06-26 NOTE — H&P
INTENSIVIST   INITIAL HOSPITAL VISIT NOTE     Chief Complaint: Dizziness, headache    Subjective   S     Edna Motley is a 43 y.o. female with PMHx Massey syndrome, breast cancer and chronic migraines who presents to [facility] ED via EMS from her chiropractor's office after developing acute-onset dizziness, nausea, vomiting and diaphoresis. On arrival, she endorsed pain bilaterally in her neck, presyncope and blurry vision.  No overt syncopal event during or following manipulation.      On arrival CT angiogram demonstrated bilateral extracranial vertebral artery and basilar artery dissections. In retrospect patient notes a fairly persistent headache over the last 2x weeks. Headache similar in character to baseline migraines with bifrontal temporal pressure.    Both neurology and neurosurgery consulted for evaluation with recommendations for blood pressure control <180 and heparin infusion. Patient currently has no neurological deficits. She is admitted to the ICU for further care.     Time spent: 5 minutes  Electronically signed by SHARONA Cummings, 06/26/24, 6:01 PM EDT.     PMH: She  has a past medical history of Cluster headache (All my life), Constipation, CTS (carpal tunnel syndrome) (2020), Depression (Off and on), Difficulty walking (Worse back leg etc), Endometriosis, Generalized headaches, Headache, tension-type (Everyday), Heart murmur, Hyperlipidemia (01/26/2023), Massey syndrome (09/28/2016), Massey syndrome, Malignant neoplasm of lower-outer quadrant of left female breast (09/28/2016), Migraines (09/21/2016), Multiple gestation, Osteoarthritis (I think I have this), Osteoporosis (Pain everywhere), Pelvic pain (09/21/2016), Wears glasses, and Well woman exam with routine gynecological exam (10/25/2017).   PSxH: She  has a past surgical history that includes Oophorectomy; Hysterectomy; Dilation and curettage, diagnostic / therapeutic; Mastectomy w/ sentinel node biopsy (Bilateral, 11/01/2016); breast  reconstruction, breast tissue expander insertion (Bilateral, 11/01/2016); Colonoscopy (10/14/2016); Upper gastrointestinal endoscopy (11/19/2014); Venous Access Device (Port) Removal (Right, 03/17/2017); Breast surgery; breast reconstruction, breast tissue expander removal, implant insertion (Left, 06/09/2017); Breast biopsy (9/22/16); Total abdominal hysterectomy w/ bilateral salpingoophorectomy (09/01/2011); and [place] tooth extraction (08/01/1999).      Medications:  No current facility-administered medications on file prior to encounter.     Current Outpatient Medications on File Prior to Encounter   Medication Sig    cyclobenzaprine (FLEXERIL) 5 MG tablet Take 1 tablet by mouth At Night As Needed for Muscle Spasms.    dexAMETHasone (DECADRON) 1 MG tablet Take 2 tablets twice daily for 2 days, then 1-1/2 tablets twice daily for 2 days, then 1 tablet twice daily for 2 days, then half a tablet twice daily for 2 days.    Erenumab-aooe (AIMOVIG) 140 MG/ML auto-injector Inject 1 mL under the skin into the appropriate area as directed Every 30 (Thirty) Days.    estradiol (ESTRACE VAGINAL) 0.1 MG/GM vaginal cream Insert 1/2 applicator per 2-3x per week at night    Multiple Vitamins-Minerals (HAIR SKIN AND NAILS FORMULA) tablet Take  by mouth Daily.    nortriptyline (Pamelor) 25 MG capsule Take 1 capsule by mouth Every Night.    polyethylene glycol (MIRALAX) packet Take 17 g by mouth As Needed.    SUMAtriptan (IMITREX) 100 MG tablet TAKE 1 TABLET BY MOUTH AT THE ONSET OF MIGRAINE.  MAY REPEAT AFTER 2 HOURS, MAX 2 TABS PER 24 HOURS    venlafaxine (EFFEXOR) 75 MG tablet Take 1 tablet by mouth 2 (Two) Times a Day.    Zavegepant HCl (Zavzpret) 10 MG/ACT solution 10 mg into the nostril(s) as directed by provider As Needed (At the onset of her headache.  Do not use more than once a day).     Allergies: She is allergic to penicillins.   FH: Her family history includes Alzheimer's disease in an other family member; Breast  "cancer in her maternal grandmother and mother; Cancer in an other family member; Colon cancer in her maternal grandmother; Migraines in her mother; Prostate cancer in her paternal grandfather; Uterine cancer in her mother.   SH: She  reports that she has never smoked. She has never been exposed to tobacco smoke. She has never used smokeless tobacco. She reports current alcohol use. She reports that she does not use drugs.     The patient's relevant past medical, surgical and social history were reviewed and updated in Epic as appropriate.     Objective   O     Medications (drips):  heparin  niCARdipine  Pharmacy to Dose Heparin    Physical Examination:  Vital Signs: Blood pressure 133/95, pulse 66, temperature 98 °F (36.7 °C), temperature source Oral, resp. rate 18, height 167.6 cm (66\"), weight 90.7 kg (200 lb), SpO2 93%.    General: The patient appears in no acute distress. Alert, cooperative and interactive.  HEENT:NC/AT, PERRL, Normal nasal mucosa, MMM.  Chest: Clear to auscultation bilaterally, No wheezing, rhonchi, or rales. Normal work of breathing. Equal chest rise.  Cardiac: Regular rhythm, normal rate, S1S2 auscultated. No murmurs, rubs or gallops.   Extremities: No lower extremity edema. No clubbing or cyanosis.   Neuro: Motor power grossly intact bilaterally. Sensation intact. Speech fluid and fluent. Thought process coherent.  Psych: Drowsy. However, wakes easily. Alert and oriented x3. Mood stable.    Lines, Drains & Airways       Active LDAs       Name Placement date Placement time Site Days    Peripheral IV 06/26/24 1622 Right Antecubital 06/26/24  1622  Antecubital  less than 1    Peripheral IV 06/26/24 1627 Right Antecubital 06/26/24  1627  Antecubital  less than 1             Results from last 7 days   Lab Units 06/26/24  1631   WBC 10*3/mm3 10.63   HEMOGLOBIN g/dL 15.9   MCV fL 90.8   PLATELETS 10*3/mm3 254     Results from last 7 days   Lab Units 06/26/24  1631   SODIUM mmol/L 138   POTASSIUM " mmol/L 3.8   CO2 mmol/L 27.0   CREATININE mg/dL 0.88   MAGNESIUM mg/dL 2.6     Estimated Creatinine Clearance: 93.6 mL/min (by C-G formula based on SCr of 0.88 mg/dL).  Results from last 7 days   Lab Units 06/26/24  1631   ALK PHOS U/L 90   BILIRUBIN mg/dL 0.5   ALT (SGPT) U/L 29   AST (SGOT) U/L 18     Respiratory (ie nasal cannula, HFNC, BiPAP, mechanical ventilation settings) support: Room air    Images:  CT Head Without Contrast Stroke Protocol    Result Date: 6/26/2024  Impression: No acute intracranial abnormality. 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 Please refer to the CTA of the head and neck report from earlier today. Electronically Signed: James Montez DO  6/26/2024 6:15 PM EDT  Workstation ID: [id number]    CT Angiogram Head    Result Date: 6/26/2024  Bilateral extracranial vertebral artery dissections as above. The basilar artery likewise appears dissected. Findings discussed with Dr. Hnut at 5:22 p.m. on 6/26/2024 Electronically Signed: Otoniel Pinzon MD  6/26/2024 5:25 PM EDT  Workstation ID: [id number]    CT Angiogram Neck    Result Date: 6/26/2024  Bilateral extracranial vertebral artery dissections as above. The basilar artery likewise appears dissected. Findings discussed with Dr. Hunt at 5:22 p.m. on 6/26/2024 Electronically Signed: Otoniel Pinzon MD  6/26/2024 5:25 PM EDT  Workstation ID: [id number]    XR Chest 1 View    Result Date: 6/26/2024  Impression: No evidence of active chest disease. Electronically Signed: Jason Nguyen MD  6/26/2024 4:38 PM EDT  Workstation ID: [id number]     - I reviewed the patient's new laboratory and imaging results.  - I independently reviewed the patient's new images.    Assessment & Plan    A / P     Active Hospital Problems    Diagnosis     **Dissection, vertebral artery     Migraines      -Admit patient to the ICU for close hemodynamic and neurologic  monitoring. Communicated STAT page to neurosurgery + ICU in the setting of clinical or symptomatic change (ie neurologic exam, GCS, worsening pain, vision alterations, etc)  -Neurology and neurosurgery consulted for management of dissection. Imaging as above. MRI is pending  -Nicardipine for blood pressure control; goal systolic BP per neurosurgery (<180)  -Heparin gtt   -AM labs    F-NPO  A-NA  S-NA   T-Heparin gtt  H-Head of bed greater than 30 degrees  U-NA  G-FSBS per unit protocol, correction dose insulin  S-NA  B-Will monitor and provide regimen if indicated  I-PIV  D-NA    Plan of care and goals reviewed during interdisciplinary rounds.  I discussed the patient's findings and my recommendations with patient, family, nursing staff, and consulting provider.     Time: Critical Care time spent in direct patient care: 30 minutes (excluding procedure time, if applicable) including high complexity decision making to assess, manipulate, and support vital organ system failure in this individual who has impairment of one or more vital organ systems such that there is a high probability of imminent or life threatening deterioration in the patient’s condition.    -- Kamron Chan MD  Pulmonary/Critical Care

## 2024-06-26 NOTE — ED NOTES
Edna Motley    Nursing Report ED to Floor:  Mental status: A&O x4  Ambulatory status: with assistance  Oxygen Therapy:  RA  Cardiac Rhythm: NSR  Admitted from: home  Safety Concerns:  high risk for stroke  Social Issues: n/a  ED Room #:  17    ED Nurse Phone Extension - [phone] or may call [phone].      HPI:   Chief Complaint   Patient presents with    Dizziness       Past Medical History:  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    Massey syndrome     Malignant neoplasm of lower-outer quadrant of left female breast 09/28/2016    Migraines 09/21/2016    Multiple gestation     I have a 13 and 15 year old    Osteoarthritis I think I have this    Pain dr I believe checked    Osteoporosis Pain everywhere    Pelvic pain 09/21/2016    Wears glasses     Well woman exam with routine gynecological exam 10/25/2017        Past Surgical History:  Past Surgical History:   Procedure Laterality Date    BREAST BIOPSY  9/22/16    Result cancer    BREAST RECONSTRUCTION, BREAST TISSUE EXPANDER INSERTION Bilateral 11/01/2016    Procedure: BILATERAL BREAST IMMEDIATE RECONSTRUCTION, BREAST TISSUE EXPANDER INSERTION;  Surgeon: Kenny Massey MD;  Location: [facility] OR;  Service:     BREAST RECONSTRUCTION, BREAST TISSUE EXPANDER REMOVAL, IMPLANT INSERTION Left 06/09/2017    Procedure: PLACEMENT OF LEFT RECONSTRUCTED BREAST TISSUE EXPANDER ;  Surgeon: Kenny Massey MD;  Location: [facility] OR;  Service:     BREAST SURGERY       "COLONOSCOPY  10/14/2016    DILATION AND CURETTAGE, DIAGNOSTIC / THERAPEUTIC      x2    HYSTERECTOMY      MASTECTOMY W/ SENTINEL NODE BIOPSY Bilateral 11/01/2016    Procedure: BILATERAL BREAST MASTECTOMY WITH LEFT SENTINEL NODE BIOPSY POSS AXILLARY NODE DISECTION;  Surgeon: Jose F Johnson MD;  Location: [facility];  Service:     OOPHORECTOMY      TOTAL ABDOMINAL HYSTERECTOMY WITH SALPINGO OOPHORECTOMY  09/01/2011    Endometriosis    UPPER GASTROINTESTINAL ENDOSCOPY  11/19/2014    VENOUS ACCESS DEVICE (PORT) REMOVAL Right 03/17/2017    WISDOM TOOTH EXTRACTION  08/01/1999    3 teeth removed        Admitting Doctor:   Ranjit Chan MD    Consulting Provider(s):  Consults       Date and Time Order Name Status Description    6/26/2024  5:49 PM Inpatient Neurosurgery Consult               Admitting Diagnosis:   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    Most Recent Vitals:   Vitals:    06/26/24 1624 06/26/24 1630 06/26/24 1645   BP: 139/94 134/99 133/95   BP Location: Right arm     Patient Position: Lying     Pulse: 62  66   Resp: 18     Temp: 98 °F (36.7 °C)     TempSrc: Oral     SpO2: 97%  93%   Weight: 90.7 kg (200 lb)     Height: 167.6 cm (66\")         Active LDAs/IV Access:   Lines, Drains & Airways       Active LDAs       Name Placement date Placement time Site Days    Peripheral IV 06/26/24 1622 Right Antecubital 06/26/24  1622  Antecubital  less than 1    Peripheral IV 06/26/24 1627 Right Antecubital 06/26/24  1627  Antecubital  less than 1                    Labs (abnormal labs have a star):   Labs Reviewed   COMPREHENSIVE METABOLIC PANEL - Abnormal; Notable for the following components:       Result Value    Glucose 120 (*)     All other components within normal limits    Narrative:     GFR Normal >60  Chronic Kidney Disease <60  Kidney Failure <15     URINALYSIS W/ MICROSCOPIC IF INDICATED (NO CULTURE) - Abnormal; Notable for the following components:    Protein,  mg/dL " (2+) (*)     All other components within normal limits   CBC WITH AUTO DIFFERENTIAL - Abnormal; Notable for the following components:    RDW 11.9 (*)     Immature Grans % 0.8 (*)     Neutrophils, Absolute 7.31 (*)     Immature Grans, Absolute 0.08 (*)     All other components within normal limits   HEPARIN ANTI XA - Abnormal; Notable for the following components:    Heparin Anti-Xa (UFH) 0.10 (*)     All other components within normal limits   APTT - Abnormal; Notable for the following components:    PTT 22.2 (*)     All other components within normal limits    Narrative:     PTT = The equivalent PTT values for the therapeutic range of heparin levels at 0.3 to 0.5 U/ml are 60 to 70 seconds.   SINGLE HS TROPONIN T - Normal    Narrative:     High Sensitive Troponin T Reference Range:  <14.0 ng/L- Negative Female for AMI  <22.0 ng/L- Negative Male for AMI  >=14 - Abnormal Female indicating possible myocardial injury.  >=22 - Abnormal Male indicating possible myocardial injury.   Clinicians would have to utilize clinical acumen, EKG, Troponin, and serial changes to determine if it is an Acute Myocardial Infarction or myocardial injury due to an underlying chronic condition.        MAGNESIUM - Normal   TSH - Normal   PROTIME-INR - Normal   RAINBOW DRAW    Narrative:     The following orders were created for panel order [place] Draw.  Procedure                               Abnormality         Status                     ---------                               -----------         ------                     Green Top (Gel)[[id number]]                                  Final result               Lavender Top[[id number]]                                     Final result               Gold Top - [facility][[id number]]                                   Final result               Carrera Top[[id number]]                                         Final result               Light Blue Top[[id number]]                                   Final result                  Please view results for these tests on the individual orders.   URINALYSIS, MICROSCOPIC ONLY   HEPARIN ANTI XA   CBC AND DIFFERENTIAL    Narrative:     The following orders were created for panel order CBC & Differential.  Procedure                               Abnormality         Status                     ---------                               -----------         ------                     CBC Auto Differential[[id number]]        Abnormal            Final result                 Please view results for these tests on the individual orders.   GREEN TOP   LAVENDER TOP   GOLD TOP - SST   GRAY TOP   LIGHT BLUE TOP       Meds Given in ED:   Medications   sodium chloride 0.9 % flush 10 mL (has no administration in time range)   heparin 61790 units/250 mL (100 units/mL) in 0.45 % NaCl infusion (has no administration in time range)   Pharmacy to Dose Heparin (has no administration in time range)   niCARdipine (CARDENE) 25mg in 250mL NS infusion (has no administration in time range)   nitroglycerin (NITROSTAT) SL tablet 0.4 mg (has no administration in time range)   sodium chloride 0.9 % flush 10 mL (has no administration in time range)   sodium chloride 0.9 % flush 10 mL (has no administration in time range)   sodium chloride 0.9 % infusion 40 mL (has no administration in time range)   sennosides-docusate (PERICOLACE) 8.6-50 MG per tablet 2 tablet (has no administration in time range)     And   polyethylene glycol (MIRALAX) packet 17 g (has no administration in time range)     And   bisacodyl (DULCOLAX) EC tablet 5 mg (has no administration in time range)     And   bisacodyl (DULCOLAX) suppository 10 mg (has no administration in time range)   Potassium Replacement - Follow Nurse / BPA Driven Protocol (has no administration in time range)   Magnesium Standard Dose Replacement - Follow Nurse / BPA Driven Protocol (has no administration in time range)   Phosphorus Replacement - Follow Nurse / BPA Driven Protocol  (has no administration in time range)   Calcium Replacement - Follow Nurse / BPA Driven Protocol (has no administration in time range)   sodium chloride 0.9 % bolus 1,000 mL (1,000 mL Intravenous New Bag 6/26/24 1655)   prochlorperazine (COMPAZINE) injection 10 mg (10 mg Intravenous Given 6/26/24 1656)   iopamidol (ISOVUE-370) 76 % injection 100 mL (85 mL Intravenous Given 6/26/24 1704)     heparin, 11 Units/kg/hr  niCARdipine, 5-15 mg/hr  Pharmacy to Dose Heparin,          Last NIH score:                                                          Dysphagia screening results:  Patient Factors Component (Dysphagia:Stroke or Rule-out)  Best Eye Response: 4-->(E4) spontaneous (06/26/24 1631)  Best Motor Response: 6-->(M6) obeys commands (06/26/24 1631)  Best Verbal Response: 5-->(V5) oriented (06/26/24 1631)  [place] Coma Scale Score: 15 (06/26/24 1631)     [place] Coma Scale:  No data recorded     CIWA:        Restraint Type:            Isolation Status:  No active isolations

## 2024-06-26 NOTE — PROGRESS NOTES
HEPARIN INFUSION  Edna Motley is a  43 y.o. female receiving heparin infusion.     Therapy for (VTE/Cardiac): Cardiac   Patient Weight: 90.7 kg  Initial Bolus (Y/N): No  Any Bolus (Y/N): No bolus ever      Signs or Symptoms of Bleeding: None noted per RN    Cardiac or Other (Not VTE)  Initial rate: 12 units/kg/hr (Max 1,000 units/hr)   Anti Xa Rebolus Infusion Hold time Change infusion Dose (Units/kg/hr) Next Anti Xa or aPTT Level Due   < 0.11 None Increase by  3 Units/kg/hr 6 hours   0.11- 0.19 None Increase by  2 Units/kg/hr 6 hours   0.2 - 0.29 0 None Increase by  1 Units/kg/hr 6 hours   0.3 - 0.5 0 None No Change 6 hours (after 2 consecutive levels in range check qAM)   0.51 - 0.6 0 None Decrease by  1 Units/kg/hr 6 hours   0.61 - 0.8 0 30 Minutes Decrease by  2 Units/kg/hr 6 hours   0.81 - 1 0 60 Minutes Decrease by  3 Units/kg/hr 6 hours   >1 0 Hold  After Anti Xa less than 0.5 decrease previous rate by  4 Units/kg/hr  Every 2 hours until Anti Xa  less than 0.5 then when infusion restarts in 6 hours     Results from last 7 days   Lab Units 06/26/24  1631   INR  0.96   HEMOGLOBIN g/dL 15.9   HEMATOCRIT % 46.5   PLATELETS 10*3/mm3 254        Date   Time   Anti-Xa Current Rate (Unit/kg/hr) Bolus   (Units) Rate Change   (Unit/kg/hr) New Rate (Unit/kg/hr) Next   Anti-Xa Comments  Pump Check Daily   6/26 1756 0.1 -- No bolus +11 11 0000 D/w RN       --           --           --           --           --           --           --           --           --           --           --           --           --           --           --           --           --           --           --           --       Thank you,    Deborah King, PharmD, BCPS  6/26/2024  18:00 EDT

## 2024-06-26 NOTE — CONSULTS
Stroke Consult Note    Patient Name: Edna Motley   MRN: [id number]  Age: 43 y.o.  Sex: female  : 1980    Primary Care Physician: José Luis Redman MD  Referring Physician: Dr. Darden    TIME STROKE TEAM CALLED:  EST     TIME PATIENT SEEN:  EST    Handedness: Right  Race:     Chief Complaint/Reason for Consultation: Sudden onset dizziness, nausea, vomiting after chiropractic adjustment    HPI: Edna Motley is a 43-year-old female with a PMH significant for migraines (followed by Dr. Rocha), Massey syndrome, breast cancer, CTS, and depression who presented to the [facility] ED with a sudden onset of dizziness, nausea, and vomiting after chiropractic adjustment this afternoon.  EMS was called and she was brought to the [facility] ED for further evaluation.  She was given Zofran en route.  Per review of EMR, she continued to be nauseous on arrival to the ED and was given Compazine.  She complained of bilateral neck pain. 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 Stroke neurology was contacted by Dr. Darden of neurosurgery for further evaluation of the patient.      On exam, the patient is drowsy. GCS 14. NIH 1. /94. Patient reports that she has had a significant migraine for the last 10 days after returning back from a trip in [place].  She went to the chiropractor today for an adjustment which typically helps her migraines.  She reports that at the end of her adjustment she felt something pop and had instant severe dizziness, nausea, and vomiting.  EMS was called and she was brought to the [facility] ED for further evaluation.  Stat CT head ordered that revealed subtle hypodensities in the bilateral cerebellum concerning for early infarct.  Heparin drip with no bolus ever ordered.  The patient will be admitted to the neuro ICU for close neurologic monitoring.    Last Known Normal Date/Time:  EST      Review of Systems   Constitutional:  Negative for chills and fever.   HENT:  Negative for congestion and trouble swallowing.    Eyes:  Positive for photophobia. Negative for visual disturbance.   Respiratory:  Negative for cough and shortness of breath.    Cardiovascular:  Negative for chest pain and palpitations.   Gastrointestinal:  Positive for nausea and vomiting.   Musculoskeletal:  Negative for gait problem.   Neurological:  Positive for dizziness and headaches. Negative for facial asymmetry, speech difficulty, weakness and numbness.      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    Massey syndrome     Malignant neoplasm of lower-outer quadrant of left female breast 09/28/2016    Migraines 09/21/2016    Multiple gestation     I have a 13 and 15 year old    Osteoarthritis I think I have this    Pain dr I believe checked    Osteoporosis Pain everywhere    Pelvic pain 09/21/2016    Wears glasses     Well woman exam with routine gynecological exam 10/25/2017     Past Surgical History:   Procedure Laterality Date    BREAST BIOPSY  9/22/16    Result cancer    BREAST RECONSTRUCTION, BREAST TISSUE EXPANDER INSERTION Bilateral 11/01/2016    Procedure: BILATERAL BREAST IMMEDIATE RECONSTRUCTION, BREAST TISSUE EXPANDER INSERTION;  Surgeon: Kenny Massey MD;  Location: [facility];  Service:     BREAST RECONSTRUCTION, BREAST TISSUE EXPANDER REMOVAL, IMPLANT INSERTION Left 06/09/2017    Procedure: PLACEMENT OF LEFT  RECONSTRUCTED BREAST TISSUE EXPANDER ;  Surgeon: Kenny Massey MD;  Location:  [facility];  Service:     BREAST SURGERY      COLONOSCOPY  10/14/2016    DILATION AND CURETTAGE, DIAGNOSTIC / THERAPEUTIC      x2    HYSTERECTOMY      MASTECTOMY W/ SENTINEL NODE BIOPSY Bilateral 11/01/2016    Procedure: BILATERAL BREAST MASTECTOMY WITH LEFT SENTINEL NODE BIOPSY POSS AXILLARY NODE DISECTION;  Surgeon: Jose F Johnson MD;  Location:  [facility];  Service:     OOPHORECTOMY      TOTAL ABDOMINAL HYSTERECTOMY WITH SALPINGO OOPHORECTOMY  09/01/2011    Endometriosis    UPPER GASTROINTESTINAL ENDOSCOPY  11/19/2014    VENOUS ACCESS DEVICE (PORT) REMOVAL Right 03/17/2017    WISDOM TOOTH EXTRACTION  08/01/1999    3 teeth removed     Family History   Problem Relation Age of Onset    Breast cancer Mother         Mom    Uterine cancer Mother         Mom    Migraines Mother         All the time from what I remember    Breast cancer Maternal Grandmother         Great grandmother    Colon cancer Maternal Grandmother         Believe she went to [facility]    Alzheimer's disease Other     Cancer Other     Prostate cancer Paternal Grandfather         Went to [facility]     Social History     Socioeconomic History    Marital status:    Tobacco Use    Smoking status: Never     Passive exposure: Never    Smokeless tobacco: Never    Tobacco comments:     None ever   Vaping Use    Vaping status: Never Used   Substance and Sexual Activity    Alcohol use: Yes     Comment: Very seldom 3-4 times a year    Drug use: Never    Sexual activity: Yes     Partners: Male     Birth control/protection: Post-menopausal, None, Hysterectomy     Comment: Hurts / no hormones / we barely try anymore     Allergies   Allergen Reactions    Penicillins GI Intolerance     Prior to Admission medications    Medication Sig Start Date End Date Taking? Authorizing Provider   cyclobenzaprine (FLEXERIL) 5 MG tablet Take 1 tablet by mouth At Night As Needed for Muscle Spasms.  10/23/23   Sara Rocha MD   dexAMETHasone (DECADRON) 1 MG tablet Take 2 tablets twice daily for 2 days, then 1-1/2 tablets twice daily for 2 days, then 1 tablet twice daily for 2 days, then half a tablet twice daily for 2 days. 6/18/24   Sara Rocha MD   Erenumab-aooe (AIMOVIG) 140 MG/ML auto-injector Inject 1 mL under the skin into the appropriate area as directed Every 30 (Thirty) Days. 2/20/23   Sara Rocha MD   estradiol (ESTRACE VAGINAL) 0.1 MG/GM vaginal cream Insert 1/2 applicator per 2-3x per week at night 6/2/23   Beatriz Salazar APRN   Multiple Vitamins-Minerals (HAIR SKIN AND NAILS FORMULA) tablet Take  by mouth Daily.    ProviderZeina MD   nortriptyline (Pamelor) 25 MG capsule Take 1 capsule by mouth Every Night. 2/26/24 2/25/25  Sara Rocha MD   polyethylene glycol (MIRALAX) packet Take 17 g by mouth As Needed.    Provider, MD Zeina   SUMAtriptan (IMITREX) 100 MG tablet TAKE 1 TABLET BY MOUTH AT THE ONSET OF MIGRAINE.  MAY REPEAT AFTER 2 HOURS, MAX 2 TABS PER 24 HOURS 4/19/24   Sara Rocha MD   venlafaxine (EFFEXOR) 75 MG tablet Take 1 tablet by mouth 2 (Two) Times a Day. 11/8/23   Beatriz Salazar APRN   Zavegepant HCl (Zavzpret) 10 MG/ACT solution 10 mg into the nostril(s) as directed by provider As Needed (At the onset of her headache.  Do not use more than once a day). 6/11/24   Sara Rocha MD         Temp:  [98 °F (36.7 °C)] 98 °F (36.7 °C)  Heart Rate:  [62-66] 66  Resp:  [18] 18  BP: (133-139)/(94-99) 133/95  Neurological Exam  Mental Status  Arousable to verbal stimuli. Oriented to person, place and time. Oriented to person, place, and time. Speech is normal. Language is fluent with no aphasia.    Cranial Nerves  CN II: Visual acuity is normal. Visual fields full to confrontation.  CN III, IV, VI: Extraocular movements intact bilaterally. Normal lids and orbits bilaterally. Pupils equal round and reactive to light bilaterally.  CN V: Facial  sensation is normal.  CN VII: Full and symmetric facial movement.  CN IX, X: Palate elevates symmetrically  CN XI: Shoulder shrug strength is normal.  CN XII: Tongue midline without atrophy or fasciculations.    Motor   Strength is 5/5 throughout all four extremities.    Sensory  Light touch is normal in upper and lower extremities.     Coordination  Right: Finger-to-nose normal.Left: Finger-to-nose normal.    Gait    Not observed.      Physical Exam  Constitutional:       General: She is not in acute distress.     Comments: Drowsy, awakens easily to voice.  Follows all commands.   HENT:      Head: Normocephalic and atraumatic.   Eyes:      General: Lids are normal.      Extraocular Movements: Extraocular movements intact.      Pupils: Pupils are equal, round, and reactive to light.   Cardiovascular:      Rate and Rhythm: Normal rate and regular rhythm.   Pulmonary:      Effort: Pulmonary effort is normal. No respiratory distress.   Musculoskeletal:      Right lower leg: No edema.      Left lower leg: No edema.   Skin:     General: Skin is warm and dry.      Capillary Refill: Capillary refill takes less than 2 seconds.   Neurological:      Mental Status: She is oriented to person, place, and time.      Cranial Nerves: No cranial nerve deficit.      Sensory: No sensory deficit.      Motor: Motor strength is normal.No weakness.      Coordination: Coordination normal.      Comments: Drowsiness, awakens easily to voice   Psychiatric:         Speech: Speech normal.         Acute Stroke Data    Thrombolytic Inclusion / Exclusion Criteria    Time: 1745  Person Administering Scale: SHARONA Drew    Inclusion Criteria  [x]   18 years of age or greater   []   Onset of symptoms < 4.5 hours before beginning treatment (stroke onset = time patient was last seen well or without symptoms).   []   Diagnosis of acute ischemic stroke causing measurable disabling deficit (Complete Hemianopia, Any Aphasia, Visual or Sensory  Extinction, Any weakness limiting sustained effort against gravity)   []   Any remaining deficit considered potentially disabling in view of patient and practitioner   Exclusion criteria (Do not proceed with Alteplase if any are checked under exclusion criteria)  []   Onset unknown or GREATER than 4.5 hours   []   ICH on CT/MRI   [x]   CT demonstrates hypodensity representing acute or subacute infarct   []   Significant head trauma or prior stroke in the previous 3 months   []   Symptoms suggestive of subarachnoid hemorrhage   []   History of un-ruptured intracranial aneurysm GREATER than 10 mm   []   Recent intracranial or intraspinal surgery within the last 3 months   []   Arterial puncture at a non-compressible site in the previous 7 days   []   Active internal bleeding   []   Acute bleeding tendency   []   Platelet count LESS than 100,000 for known hematological diseases such as leukemia, thrombocytopenia or chronic cirrhosis   []   Current use of anticoagulant with INR GREATER than 1.7 or PT GREATER than 15 seconds, aPTT GREATER than 40 seconds   []   Heparin received within 48 hours, resulting in abnormally elevated aPTT GREATER than upper limit of normal   []   Current use of direct thrombin inhibitors or direct factor Xa inhibitors in the past 48 hours   []   Elevated blood pressure refractory to treatment (systolic GREATER than 185 mm/Hg or diastolic  GREATER than 110 mm/Hg   []   Suspected infective endocarditis and aortic arch dissection   []   Current use of therapeutic treatment dose of low-molecular-weight heparin (LMWH) within the previous 24 hours   []   Structural GI malignancy or bleed   Relative exclusion for all patients  [x]   Only minor non-disabling symptoms   []   Pregnancy   []   Seizure at onset with postictal residual neurological impairments   []   Major surgery or previous trauma within past 14 days   []   History of previous spontaneous ICH, intracranial neoplasm, or AV malformation    []   Postpartum (within previous 14 days)   []   Recent GI or urinary tract hemorrhage (within previous 21 days)   []   Recent acute MI (within previous 3 months)   []   History of un-ruptured intracranial aneurysm LESS than 10 mm   []   History of ruptured intracranial aneurysm   []   Blood glucose LESS than 50 mg/dL (2.7 mmol/L)   []   Dural puncture within the last 7 days   []   Known GREATER than 10 cerebral microbleeds   Additional exclusions for patients with symptoms onset between 3 and 4.5 hours.  []   Age > 80.   []   On any anticoagulants regardless of INR  >>> Warfarin (Coumadin), Heparin, Enoxaparin (Lovenox), fondaparinux (Arixtra), bivalirudin (Angiomax), Argatroban, dabigatran (Pradaxa), rivaroxaban (Xarelto), or apixaban (Eliquis)   []   Severe stroke (NIHSS > 25).   []   History of BOTH diabetes and previous ischemic stroke.   []   The risks and benefits have been discussed with the patient or family related to the administration of IV thrombolytic therapy for stroke symptoms.   []   I have discussed and reviewed the patient's case and imaging with the attending prior to IV thrombolytic therapy.    Time IV thrombolytic administered       Hospital Meds:  Scheduled- [START ON 6/27/2024] pantoprazole, 40 mg, Intravenous, Q AM  senna-docusate sodium, 2 tablet, Oral, BID  sodium chloride, 10 mL, Intravenous, Q12H      Infusions- heparin, 11 Units/kg/hr  niCARdipine, 5-15 mg/hr  Pharmacy to Dose Heparin,        PRNs-   senna-docusate sodium **AND** polyethylene glycol **AND** bisacodyl **AND** bisacodyl    Calcium Replacement - Follow Nurse / BPA Driven Protocol    Magnesium Standard Dose Replacement - Follow Nurse / BPA Driven Protocol    nitroglycerin    Pharmacy to Dose Heparin    Phosphorus Replacement - Follow Nurse / BPA Driven Protocol    Potassium Replacement - Follow Nurse / BPA Driven Protocol    sodium chloride    sodium chloride    sodium chloride    Functional Status Prior to Current  Stroke/Stephenson Score: 0    NIH Stroke Scale  Time:1745  Person Administering Scale: SHARONA Drew    Interval: baseline  1a. Level of Consciousness: 1-->Not alert, but arousable by minor stimulation to obey, answer, or respond  1b. LOC Questions: 0-->Answers both questions correctly  1c. LOC Commands: 0-->Performs both tasks correctly  2. Best Gaze: 0-->Normal  3. Visual: 0-->No visual loss  4. Facial Palsy: 0-->Normal symmetrical movements  5a. Motor Arm, Left: 0-->No drift, limb holds 90 (or 45) degrees for full 10 secs  5b. Motor Arm, Right: 0-->No drift, limb holds 90 (or 45) degrees for full 10 secs  6a. Motor Leg, Left: 0-->No drift, leg holds 30 degree position for full 5 secs  6b. Motor Leg, Right: 0-->No drift, leg holds 30 degree position for full 5 secs  7. Limb Ataxia: 0-->Absent  8. Sensory: 0-->Normal, no sensory loss  9. Best Language: 0-->No aphasia, normal  10. Dysarthria: 0-->Normal  11. Extinction and Inattention (formerly Neglect): 0-->No abnormality    Total (NIH Stroke Scale): 1   Results Reviewed:  I have personally reviewed current lab, radiology, and data.  CT Head Without Contrast Stroke Protocol    Result Date: 6/26/2024  Impression: No acute intracranial abnormality. 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 Please refer to the CTA of the head and neck report from earlier today. Electronically Signed: James Montez DO  6/26/2024 6:15 PM EDT  Workstation ID: [id number]    CT Angiogram Head    Result Date: 6/26/2024  Bilateral extracranial vertebral artery dissections as above. The basilar artery likewise appears dissected. Findings discussed with Dr. Hunt at 5:22 p.m. on 6/26/2024 Electronically Signed: Otoniel Pinzon MD  6/26/2024 5:25 PM EDT  Workstation ID: [id number]    CT Angiogram Neck    Result Date: 6/26/2024  Bilateral extracranial vertebral artery dissections as  above. The basilar artery likewise appears dissected. Findings discussed with Dr. Hunt at 5:22 p.m. on 6/26/2024 Electronically Signed: Otoniel Pinzon MD  6/26/2024 5:25 PM EDT  Workstation ID: [id number]    XR Chest 1 View    Result Date: 6/26/2024  Impression: No evidence of active chest disease. Electronically Signed: Jason Nguyen MD  6/26/2024 4:38 PM EDT  Workstation ID: [id number]            Assessment/Plan:    43-year-old female with a PMH significant for migraines (followed by Dr. Rocha), Massey syndrome, breast cancer (2016), CTS, and depression who presented to the [facility] ED with a sudden onset of dizziness, nausea, and vomiting after chiropractic adjustment this afternoon. 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  Stroke neurology was contacted by Dr. Darden for further evaluation of the patient.  NIH 1 for drowsiness. GCS 14.  CT head with small hypodensities in the bilateral cerebellum concerning for developing infarct on my review.  Heparin drip with no bolus ordered.  She will be admitted to the neuro ICU for close neurologic monitoring.    Antiplatelet PTA: None  Anticoagulant PTA: None    Bilateral vertebral artery dissection, basilar dissection  Hypodensities in the bilateral cerebellum concerning for AIS  -Symptoms noted after chiropractic adjustment this afternoon  -Admit to the ICU  -TIA/ischemic stroke order set  -Stat MRI to assess stroke burden  -Repeat CT head in the a.m.  -Heparin drip with no bolus ever  -Frequent neurochecks.  Monitor closely for s/s increased swelling in the posterior fossa.  -Low threshold to initiate hypertonic saline  -Every 12 hours sodiums  -Call stroke NP with any changes in neurologic exam. Low threshold to repeat imaging with any changes in neurologic exam. Discussed with ICU charge nurse.  -No sedating medications.  Can be given Tylenol for headache.  -BP goals less than 180.  Cardene drip as needed for BP greater than 180  -Defer antiplatelet therapy for now given initiation of heparin drip and increased bleeding risk  -High-dose statin  -Lipid panel, hemoglobin A1c in a.m.  -Bedrest overnight.   -N.p.o.  -PT/OT/SLP  -Plan discussed with Dr. Sellers, Dr. Franklin, Dr. Darden, Dr. Chan, SHARONA Doran, the patient, her , and nursing staff.  Stroke neurology will continue to follow closely.  Please call with any questions or concerns.    SHARONA Drew, AGACNP-BC  June 26, 2024  18:12 EDT

## 2024-06-26 NOTE — Clinical Note
Level of Care: Critical Care [6]   Admitting Physician: JASON PAINTER [[id number]]   Attending Physician: JASON PAINTER [[id number]]

## 2024-06-27 ENCOUNTER — APPOINTMENT (OUTPATIENT)
Dept: CT IMAGING | Facility: HOSPITAL | Age: 44
DRG: 299 | End: 2024-06-27
Payer: COMMERCIAL

## 2024-06-27 ENCOUNTER — APPOINTMENT (OUTPATIENT)
Dept: CARDIOLOGY | Facility: HOSPITAL | Age: 44
DRG: 299 | End: 2024-06-27
Payer: COMMERCIAL

## 2024-06-27 LAB
ALBUMIN SERPL-MCNC: 3.6 G/DL (ref 3.5–5.2)
ALBUMIN/GLOB SERPL: 1.3 G/DL
ALP SERPL-CCNC: 74 U/L (ref 39–117)
ALT SERPL W P-5'-P-CCNC: 23 U/L (ref 1–33)
ANION GAP SERPL CALCULATED.3IONS-SCNC: 8 MMOL/L (ref 5–15)
AST SERPL-CCNC: 15 U/L (ref 1–32)
BASOPHILS # BLD AUTO: 0.02 10*3/MM3 (ref 0–0.2)
BASOPHILS NFR BLD AUTO: 0.2 % (ref 0–1.5)
BH CV ECHO MEAS - AO MAX PG: 9.2 MMHG
BH CV ECHO MEAS - AO MEAN PG: 5 MMHG
BH CV ECHO MEAS - AO ROOT DIAM: 2.6 CM
BH CV ECHO MEAS - AO V2 MAX: 152 CM/SEC
BH CV ECHO MEAS - AO V2 VTI: 32.5 CM
BH CV ECHO MEAS - AVA(I,D): 2.4 CM2
BH CV ECHO MEAS - EDV(CUBED): 79.5 ML
BH CV ECHO MEAS - EDV(MOD-SP2): 90.1 ML
BH CV ECHO MEAS - EDV(MOD-SP4): 80.1 ML
BH CV ECHO MEAS - EF(MOD-BP): 63.8 %
BH CV ECHO MEAS - EF(MOD-SP2): 73 %
BH CV ECHO MEAS - EF(MOD-SP4): 58.3 %
BH CV ECHO MEAS - ESV(CUBED): 24.4 ML
BH CV ECHO MEAS - ESV(MOD-SP2): 24.3 ML
BH CV ECHO MEAS - ESV(MOD-SP4): 33.4 ML
BH CV ECHO MEAS - FS: 32.6 %
BH CV ECHO MEAS - IVS/LVPW: 1 CM
BH CV ECHO MEAS - IVSD: 1 CM
BH CV ECHO MEAS - LA DIMENSION: 2.5 CM
BH CV ECHO MEAS - LAT PEAK E' VEL: 11.9 CM/SEC
BH CV ECHO MEAS - LV MASS(C)D: 142.5 GRAMS
BH CV ECHO MEAS - LV MAX PG: 5.1 MMHG
BH CV ECHO MEAS - LV MEAN PG: 2 MMHG
BH CV ECHO MEAS - LV V1 MAX: 113 CM/SEC
BH CV ECHO MEAS - LV V1 VTI: 24.8 CM
BH CV ECHO MEAS - LVIDD: 4.3 CM
BH CV ECHO MEAS - LVIDS: 2.9 CM
BH CV ECHO MEAS - LVOT AREA: 3.1 CM2
BH CV ECHO MEAS - LVOT DIAM: 2 CM
BH CV ECHO MEAS - LVPWD: 1 CM
BH CV ECHO MEAS - MED PEAK E' VEL: 6.1 CM/SEC
BH CV ECHO MEAS - MV A MAX VEL: 73.9 CM/SEC
BH CV ECHO MEAS - MV DEC TIME: 0.37 SEC
BH CV ECHO MEAS - MV E MAX VEL: 58.2 CM/SEC
BH CV ECHO MEAS - MV E/A: 0.79
BH CV ECHO MEAS - PA ACC TIME: 0.14 SEC
BH CV ECHO MEAS - PA V2 MAX: 85 CM/SEC
BH CV ECHO MEAS - SV(LVOT): 77.9 ML
BH CV ECHO MEAS - SV(MOD-SP2): 65.8 ML
BH CV ECHO MEAS - SV(MOD-SP4): 46.7 ML
BH CV ECHO MEAS - TAPSE (>1.6): 1.8 CM
BH CV ECHO MEASUREMENTS AVERAGE E/E' RATIO: 6.47
BH CV ECHO SHUNT ASSESSMENT PERFORMED (HIDDEN SCRIPTING): 1
BH CV VAS BP LEFT ARM: NORMAL MMHG
BH CV XLRA - RV BASE: 3.2 CM
BH CV XLRA - RV LENGTH: 6.3 CM
BH CV XLRA - RV MID: 2.45 CM
BH CV XLRA - TDI S': 10.1 CM/SEC
BILIRUB SERPL-MCNC: 0.5 MG/DL (ref 0–1.2)
BUN SERPL-MCNC: 9 MG/DL (ref 6–20)
BUN/CREAT SERPL: 13.6 (ref 7–25)
CALCIUM SPEC-SCNC: 8.4 MG/DL (ref 8.6–10.5)
CHLORIDE SERPL-SCNC: 101 MMOL/L (ref 98–107)
CHOLEST SERPL-MCNC: 186 MG/DL (ref 0–200)
CO2 SERPL-SCNC: 27 MMOL/L (ref 22–29)
CREAT SERPL-MCNC: 0.66 MG/DL (ref 0.57–1)
DEPRECATED RDW RBC AUTO: 38.7 FL (ref 37–54)
EGFRCR SERPLBLD CKD-EPI 2021: 111.8 ML/MIN/1.73
EOSINOPHIL # BLD AUTO: 0.02 10*3/MM3 (ref 0–0.4)
EOSINOPHIL NFR BLD AUTO: 0.2 % (ref 0.3–6.2)
ERYTHROCYTE [DISTWIDTH] IN BLOOD BY AUTOMATED COUNT: 12 % (ref 12.3–15.4)
GLOBULIN UR ELPH-MCNC: 2.7 GM/DL
GLUCOSE BLDC GLUCOMTR-MCNC: 100 MG/DL (ref 70–130)
GLUCOSE BLDC GLUCOMTR-MCNC: 103 MG/DL (ref 70–130)
GLUCOSE BLDC GLUCOMTR-MCNC: 109 MG/DL (ref 70–130)
GLUCOSE BLDC GLUCOMTR-MCNC: 93 MG/DL (ref 70–130)
GLUCOSE SERPL-MCNC: 97 MG/DL (ref 65–99)
HBA1C MFR BLD: 5.2 % (ref 4.8–5.6)
HCT VFR BLD AUTO: 40.1 % (ref 34–46.6)
HDLC SERPL-MCNC: 63 MG/DL (ref 40–60)
HGB BLD-MCNC: 13.9 G/DL (ref 12–15.9)
IMM GRANULOCYTES # BLD AUTO: 0.04 10*3/MM3 (ref 0–0.05)
IMM GRANULOCYTES NFR BLD AUTO: 0.5 % (ref 0–0.5)
LDLC SERPL CALC-MCNC: 78 MG/DL (ref 0–100)
LDLC/HDLC SERPL: 1.06 {RATIO}
LEFT ATRIUM VOLUME INDEX: 15.7 ML/M2
LV EF 2D ECHO EST: 65 %
LYMPHOCYTES # BLD AUTO: 2.15 10*3/MM3 (ref 0.7–3.1)
LYMPHOCYTES NFR BLD AUTO: 25 % (ref 19.6–45.3)
MAGNESIUM SERPL-MCNC: 2.6 MG/DL (ref 1.6–2.6)
MCH RBC QN AUTO: 30.9 PG (ref 26.6–33)
MCHC RBC AUTO-ENTMCNC: 34.7 G/DL (ref 31.5–35.7)
MCV RBC AUTO: 89.1 FL (ref 79–97)
MONOCYTES # BLD AUTO: 0.52 10*3/MM3 (ref 0.1–0.9)
MONOCYTES NFR BLD AUTO: 6 % (ref 5–12)
NEUTROPHILS NFR BLD AUTO: 5.86 10*3/MM3 (ref 1.7–7)
NEUTROPHILS NFR BLD AUTO: 68.1 % (ref 42.7–76)
NRBC BLD AUTO-RTO: 0 /100 WBC (ref 0–0.2)
PHOSPHATE SERPL-MCNC: 4.1 MG/DL (ref 2.5–4.5)
PLATELET # BLD AUTO: 227 10*3/MM3 (ref 140–450)
PMV BLD AUTO: 10.7 FL (ref 6–12)
POTASSIUM SERPL-SCNC: 4.1 MMOL/L (ref 3.5–5.2)
PROT SERPL-MCNC: 6.3 G/DL (ref 6–8.5)
RBC # BLD AUTO: 4.5 10*6/MM3 (ref 3.77–5.28)
SODIUM SERPL-SCNC: 135 MMOL/L (ref 136–145)
SODIUM SERPL-SCNC: 136 MMOL/L (ref 136–145)
SODIUM SERPL-SCNC: 139 MMOL/L (ref 136–145)
TRIGL SERPL-MCNC: 282 MG/DL (ref 0–150)
UFH PPP CHRO-ACNC: 0.21 IU/ML (ref 0.3–0.7)
UFH PPP CHRO-ACNC: 0.27 IU/ML (ref 0.3–0.7)
UFH PPP CHRO-ACNC: 0.41 IU/ML (ref 0.3–0.7)
UFH PPP CHRO-ACNC: 0.96 IU/ML (ref 0.3–0.7)
VLDLC SERPL-MCNC: 45 MG/DL (ref 5–40)
WBC NRBC COR # BLD AUTO: 8.61 10*3/MM3 (ref 3.4–10.8)

## 2024-06-27 PROCEDURE — 82948 REAGENT STRIP/BLOOD GLUCOSE: CPT

## 2024-06-27 PROCEDURE — 99233 SBSQ HOSP IP/OBS HIGH 50: CPT | Performed by: STUDENT IN AN ORGANIZED HEALTH CARE EDUCATION/TRAINING PROGRAM

## 2024-06-27 PROCEDURE — 93306 TTE W/DOPPLER COMPLETE: CPT | Performed by: INTERNAL MEDICINE

## 2024-06-27 PROCEDURE — 83735 ASSAY OF MAGNESIUM: CPT

## 2024-06-27 PROCEDURE — 97165 OT EVAL LOW COMPLEX 30 MIN: CPT

## 2024-06-27 PROCEDURE — 25810000003 SODIUM CHLORIDE 0.9 % SOLUTION

## 2024-06-27 PROCEDURE — 80061 LIPID PANEL: CPT

## 2024-06-27 PROCEDURE — 85520 HEPARIN ASSAY: CPT

## 2024-06-27 PROCEDURE — 85025 COMPLETE CBC W/AUTO DIFF WBC: CPT

## 2024-06-27 PROCEDURE — 25010000002 DIPHENHYDRAMINE PER 50 MG

## 2024-06-27 PROCEDURE — 84100 ASSAY OF PHOSPHORUS: CPT

## 2024-06-27 PROCEDURE — 97162 PT EVAL MOD COMPLEX 30 MIN: CPT

## 2024-06-27 PROCEDURE — 83036 HEMOGLOBIN GLYCOSYLATED A1C: CPT

## 2024-06-27 PROCEDURE — 99232 SBSQ HOSP IP/OBS MODERATE 35: CPT | Performed by: INTERNAL MEDICINE

## 2024-06-27 PROCEDURE — 80053 COMPREHEN METABOLIC PANEL: CPT

## 2024-06-27 PROCEDURE — 70450 CT HEAD/BRAIN W/O DYE: CPT

## 2024-06-27 PROCEDURE — 92523 SPEECH SOUND LANG COMPREHEN: CPT

## 2024-06-27 PROCEDURE — 93306 TTE W/DOPPLER COMPLETE: CPT

## 2024-06-27 PROCEDURE — 84295 ASSAY OF SERUM SODIUM: CPT | Performed by: NURSE PRACTITIONER

## 2024-06-27 PROCEDURE — 25010000002 PROCHLORPERAZINE 10 MG/2ML SOLUTION

## 2024-06-27 PROCEDURE — 25010000002 MAGNESIUM SULFATE IN D5W 1G/100ML (PREMIX) 1-5 GM/100ML-% SOLUTION

## 2024-06-27 PROCEDURE — 99221 1ST HOSP IP/OBS SF/LOW 40: CPT | Performed by: NEUROLOGICAL SURGERY

## 2024-06-27 RX ORDER — DIPHENHYDRAMINE HYDROCHLORIDE 50 MG/ML
12.5 INJECTION INTRAMUSCULAR; INTRAVENOUS ONCE
Status: COMPLETED | OUTPATIENT
Start: 2024-06-27 | End: 2024-06-27

## 2024-06-27 RX ORDER — DIPHENHYDRAMINE HYDROCHLORIDE 50 MG/ML
12.5 INJECTION INTRAMUSCULAR; INTRAVENOUS ONCE
Status: DISCONTINUED | OUTPATIENT
Start: 2024-06-27 | End: 2024-06-27

## 2024-06-27 RX ORDER — PROCHLORPERAZINE EDISYLATE 5 MG/ML
10 INJECTION INTRAMUSCULAR; INTRAVENOUS ONCE
Status: DISCONTINUED | OUTPATIENT
Start: 2024-06-27 | End: 2024-06-27

## 2024-06-27 RX ORDER — MAGNESIUM SULFATE 1 G/100ML
1 INJECTION INTRAVENOUS ONCE
Status: COMPLETED | OUTPATIENT
Start: 2024-06-27 | End: 2024-06-27

## 2024-06-27 RX ORDER — MAGNESIUM SULFATE 1 G/100ML
1 INJECTION INTRAVENOUS ONCE
Status: DISCONTINUED | OUTPATIENT
Start: 2024-06-27 | End: 2024-06-27

## 2024-06-27 RX ORDER — PROCHLORPERAZINE EDISYLATE 5 MG/ML
10 INJECTION INTRAMUSCULAR; INTRAVENOUS ONCE
Status: COMPLETED | OUTPATIENT
Start: 2024-06-27 | End: 2024-06-27

## 2024-06-27 RX ORDER — GABAPENTIN 100 MG/1
100 CAPSULE ORAL ONCE
Status: COMPLETED | OUTPATIENT
Start: 2024-06-27 | End: 2024-06-27

## 2024-06-27 RX ADMIN — PANTOPRAZOLE SODIUM 40 MG: 40 INJECTION, POWDER, FOR SOLUTION INTRAVENOUS at 07:01

## 2024-06-27 RX ADMIN — GABAPENTIN 100 MG: 100 CAPSULE ORAL at 15:28

## 2024-06-27 RX ADMIN — SODIUM CHLORIDE 500 ML: 9 INJECTION, SOLUTION INTRAVENOUS at 20:22

## 2024-06-27 RX ADMIN — Medication 10 ML: at 20:22

## 2024-06-27 RX ADMIN — SENNOSIDES AND DOCUSATE SODIUM 2 TABLET: 50; 8.6 TABLET ORAL at 20:23

## 2024-06-27 RX ADMIN — ATORVASTATIN CALCIUM 40 MG: 40 TABLET, FILM COATED ORAL at 20:22

## 2024-06-27 RX ADMIN — MAGNESIUM SULFATE HEPTAHYDRATE 1 G: 1 INJECTION, SOLUTION INTRAVENOUS at 18:15

## 2024-06-27 RX ADMIN — PROCHLORPERAZINE EDISYLATE 10 MG: 5 INJECTION INTRAMUSCULAR; INTRAVENOUS at 18:16

## 2024-06-27 RX ADMIN — ACETAMINOPHEN 1000 MG: 500 TABLET, FILM COATED ORAL at 17:20

## 2024-06-27 RX ADMIN — ACETAMINOPHEN 1000 MG: 500 TABLET, FILM COATED ORAL at 04:33

## 2024-06-27 RX ADMIN — DIPHENHYDRAMINE HYDROCHLORIDE 12.5 MG: 50 INJECTION INTRAMUSCULAR; INTRAVENOUS at 18:15

## 2024-06-27 RX ADMIN — VALPROATE SODIUM 800 MG: 100 INJECTION, SOLUTION INTRAVENOUS at 22:49

## 2024-06-27 NOTE — CONSULTS
Diabetes Education    Patient Name:  Edna Motley  YOB: 1980  MRN: [id number]  Admit Date:  6/26/2024      Chart review for diabetes educator consult.     At the time of this review patient A1c is 5.2 , they have no noted history of diabetes and no home medications noted for treatment of diabetes. At this time we do not feel the patient would benefit from diabetes education. Thank you for this consult, should patient needs change please re consult us.    Electronically signed by:  Tati Fritz RN  06/27/24 08:27 EDT

## 2024-06-27 NOTE — THERAPY EVALUATION
Patient Name: Edna Motley  : 1980    MRN: [id number]                              Today's Date: 2024       Admit Date: 2024    Visit Dx:     ICD-10-CM ICD-9-CM   1. Dissection of extracranial vertebral artery  I77.74 443.24     Patient Active Problem List   Diagnosis    Migraines    Family history of malignant neoplasm of breast    Massey syndrome    Malignant neoplasm of lower-outer quadrant of left breast of female, estrogen receptor negative    Well woman exam with routine gynecological exam    Bone pain    Vaginal dryness, menopausal    Lumbar discogenic pain syndrome    Spondylosis of lumbar region without myelopathy or radiculopathy    DDD (degenerative disc disease), lumbar    Myofascial pain    Mild obesity    Abnormal PET scan, mediastinum    History of left breast cancer    Hyperlipidemia    History of malignant neoplasm of breast    Dissection, vertebral artery     Past Medical History:   Diagnosis Date    Cluster headache All my life    It’s like it get better with medicine but then comes back    Constipation     CTS (carpal tunnel syndrome)     Depression Off and on    When I’m hurting more I get more down    Difficulty walking Worse back leg etc    Mostly mornings when I wake up or after sitting a long time    Endometriosis     When uterus removed was told had this    Generalized headaches     Headache, tension-type Everyday    Heart murmur     Hyperlipidemia 2023    Massey syndrome 2016    Massey syndrome     Malignant neoplasm of lower-outer quadrant of left female breast 2016    Migraines 2016    Multiple gestation     I have a 13 and 15 year old    Osteoarthritis I think I have this    Pain  I believe checked    Osteoporosis Pain everywhere    Pelvic pain 2016    Wears glasses     Well woman exam with routine gynecological exam 10/25/2017     Past Surgical History:   Procedure Laterality Date    BREAST BIOPSY  16    Result cancer     BREAST RECONSTRUCTION, BREAST TISSUE EXPANDER INSERTION Bilateral 11/01/2016    Procedure: BILATERAL BREAST IMMEDIATE RECONSTRUCTION, BREAST TISSUE EXPANDER INSERTION;  Surgeon: Kenny Massey MD;  Location:  [facility];  Service:     BREAST RECONSTRUCTION, BREAST TISSUE EXPANDER REMOVAL, IMPLANT INSERTION Left 06/09/2017    Procedure: PLACEMENT OF LEFT RECONSTRUCTED BREAST TISSUE EXPANDER ;  Surgeon: Kenny Massey MD;  Location:  [facility];  Service:     BREAST SURGERY      COLONOSCOPY  10/14/2016    DILATION AND CURETTAGE, DIAGNOSTIC / THERAPEUTIC      x2    HYSTERECTOMY      MASTECTOMY W/ SENTINEL NODE BIOPSY Bilateral 11/01/2016    Procedure: BILATERAL BREAST MASTECTOMY WITH LEFT SENTINEL NODE BIOPSY POSS AXILLARY NODE DISECTION;  Surgeon: Jose F Johnson MD;  Location:  [facility];  Service:     OOPHORECTOMY      TOTAL ABDOMINAL HYSTERECTOMY WITH SALPINGO OOPHORECTOMY  09/01/2011    Endometriosis    UPPER GASTROINTESTINAL ENDOSCOPY  11/19/2014    VENOUS ACCESS DEVICE (PORT) REMOVAL Right 03/17/2017    WISDOM TOOTH EXTRACTION  08/01/1999    3 teeth removed      General Information       Row Name 06/27/24 1035          OT Time and Intention    Document Type evaluation  -CS     Mode of Treatment occupational therapy  -CS       Row Name 06/27/24 1035          General Information    Patient Profile Reviewed yes  -CS     Prior Level of Function independent:;all household mobility;ADL's  -CS     Existing Precautions/Restrictions fall;other (see comments)  R visual field blurry vision  -CS     Barriers to Rehab medically complex  -CS       Row Name 06/27/24 1035          Living Environment    People in Home spouse;child(christina), dependent  -CS       Row Name 06/27/24 1035          Home Main Entrance    Number of Stairs, Main Entrance two  -CS       Row Name 06/27/24 1035          Stairs Within Home, Primary    Stairs, Within Home, Primary can remain on one level  -CS       Row Name 06/27/24 1035          Cognition     Orientation Status (Cognition) oriented x 4  -       Row Name 06/27/24 1035          Safety Issues, Functional Mobility    Impairments Affecting Function (Mobility) pain;endurance/activity tolerance;visual/perceptual  -CS               User Key  (r) = Recorded By, (t) = Taken By, (c) = Cosigned By      Initials Name Provider Type    CS Angelica De La Paz OT Occupational Therapist                     Mobility/ADL's       Row Name 06/27/24 1036          Bed Mobility    Bed Mobility supine-sit  -CS     Supine-Sit [place] (Bed Mobility) supervision  -CS     Assistive Device (Bed Mobility) bed rails;head of bed elevated  -       Row Name 06/27/24 1036          Transfers    Transfers sit-stand transfer;stand-sit transfer  -[facility] Name 06/27/24 1036          Sit-Stand Transfer    Sit-Stand [place] (Transfers) contact guard;verbal cues  -[facility] Name 06/27/24 1036          Stand-Sit Transfer    Stand-Sit [place] (Transfers) contact guard;verbal cues  -[facility] Name 06/27/24 1036          Activities of Daily Living    BADL Assessment/Intervention lower body dressing;feeding  -[facility] Name 06/27/24 1036          Lower Body Dressing Assessment/Training    [place] Level (Lower Body Dressing) don;socks;supervision  -CS     Position (Lower Body Dressing) edge of bed sitting  -CS     Comment, (Lower Body Dressing) Pt demo crossed leg technique, educated on compensatory strategies  -       Row Name 06/27/24 1036          Self-Feeding Assessment/Training    [place] Level (Feeding) liquids to mouth;set up  -CS     Position (Self-Feeding) sitting up in bed  -CS               User Key  (r) = Recorded By, (t) = Taken By, (c) = Cosigned By      Initials Name Provider Type    Angelica Monroe OT Occupational Therapist                   Obj/Interventions       Row Name 06/27/24 1037          Sensory Assessment (Somatosensory)    Sensory Assessment (Somatosensory) UE sensation intact  -        [facility] Name 06/27/24 1037          Vision Assessment/Intervention    Visual Impairment/Limitations WFL;other (see comments)  Completed all assessments appropriately however complains of R visual field blurriness made worse w/ positional changes  -CS       [facility] Name 06/27/24 1037          Range of Motion Comprehensive    General Range of Motion bilateral upper extremity ROM WFL  -CS       [facility] Name 06/27/24 1037          Strength Comprehensive (MMT)    Comment, General Manual Muscle Testing (MMT) Assessment BUE grossly WFL  -[facility] Name 06/27/24 1037          Motor Skills    Motor Skills coordination  -     Coordination bilateral;upper extremity;finger to nose;WFL  -CS       [facility] Name 06/27/24 1037          Balance    Balance Assessment sitting static balance;sitting dynamic balance;standing static balance;standing dynamic balance  -CS     Static Sitting Balance standby assist  -CS     Dynamic Sitting Balance standby assist  -CS     Position, Sitting Balance sitting edge of bed  -CS     Static Standing Balance contact guard  -CS     Dynamic Standing Balance contact guard  -CS     Position/Device Used, Standing Balance supported  -CS     Balance Interventions sitting;standing;static;dynamic;dynamic reaching;occupation based/functional task;weight shifting activity  -CS               User Key  (r) = Recorded By, (t) = Taken By, (c) = Cosigned By      Initials Name Provider Type    CS Angelica De La Paz, OT Occupational Therapist                   Goals/Plan       Row Name 06/27/24 1054          Transfer Goal 1 (OT)    Activity/Assistive Device (Transfer Goal 1, OT) sit-to-stand/stand-to-sit;toilet  -     [place] Level/Cues Needed (Transfer Goal 1, OT) independent  -CS     Time Frame (Transfer Goal 1, OT) long term goal (LTG);1 week  -     Progress/Outcome (Transfer Goal 1, OT) goal ongoing  -CS       Row Name 06/27/24 1054          Toileting Goal 1 (OT)    Activity/Device (Toileting Goal 1, OT)  adjust/manage clothing;perform perineal hygiene  -CS     [place] Level/Cues Needed (Toileting Goal 1, OT) supervision required  -CS     Time Frame (Toileting Goal 1, OT) long term goal (LTG);1 week  -CS     Progress/Outcome (Toileting Goal 1, OT) goal ongoing  -CS       Row Name 06/27/24 1054          Grooming Goal 1 (OT)    Activity/Device (Grooming Goal 1, OT) hair care;oral care;wash face, hands  -CS     [place] (Grooming Goal 1, OT) independent  -CS     Time Frame (Grooming Goal 1, OT) short term goal (STG);3 days  -CS     Strategies/Barriers (Grooming Goal 1, OT) standing at sink  -CS     Progress/Outcome (Grooming Goal 1, OT) goal ongoing  -CS       Row Name 06/27/24 1054          Therapy Assessment/Plan (OT)    Planned Therapy Interventions (OT) activity tolerance training;adaptive equipment training;BADL retraining;functional balance retraining;occupation/activity based interventions;ROM/therapeutic exercise;strengthening exercise;transfer/mobility retraining;cognitive/visual perception retraining;neuromuscular control/coordination retraining;patient/caregiver education/training  -CS               User Key  (r) = Recorded By, (t) = Taken By, (c) = Cosigned By      Initials Name Provider Type    CS Angelica De La Paz, OT Occupational Therapist                   Clinical Impression       Row Name 06/27/24 1039          Pain Assessment    Pretreatment Pain Rating 5/10  -CS     Posttreatment Pain Rating 5/10  -CS     Pain Location - head  -CS     Pre/Posttreatment Pain Comment tolerated  -CS     Pain Intervention(s) Repositioned;Ambulation/increased activity  -CS       Row Name 06/27/24 1039          Plan of Care Review    Plan of Care Reviewed With patient  -CS     Progress improving  -CS     Outcome Evaluation OT eval complete. Pt presents w/ mild balance deficits and c/o R visual field blurriness though responded to prompts appropriately. Pt educated on compensatory stratgies and home safety techniques  d/t c/o worsening dizziness/blurry vision with positional changes. Recommend cont skilled IPOT POC 1-2 more session to ensure carry-over of education. Recommend pt DC home w/ assist.  -CS       Row Name 06/27/24 1039          Therapy Assessment/Plan (OT)    Patient/Family Therapy Goal Statement (OT) Return to PLOF  -CS     Rehab Potential (OT) good, to achieve stated therapy goals  -CS     Criteria for Skilled Therapeutic Interventions Met (OT) yes;skilled treatment is necessary  -CS     Therapy Frequency (OT) daily  -CS     Predicted Duration of Therapy Intervention (OT) 1 week  -CS       Row Name 06/27/24 1039          Therapy Plan Review/Discharge Plan (OT)    Anticipated Discharge Disposition (OT) home with assist  -CS       Row Name 06/27/24 1039          Vital Signs    Pre Systolic BP Rehab 146  -CS     Pre Treatment Diastolic BP 60  -CS     Post Systolic BP Rehab 146  -CS     Post Treatment Diastolic   -CS     Pretreatment Heart Rate (beats/min) 73  -CS     Posttreatment Heart Rate (beats/min) 69  -CS     Pre SpO2 (%) 94  -CS     O2 Delivery Pre Treatment room air  -CS     Post SpO2 (%) 96  -CS     O2 Delivery Post Treatment room air  -CS     Pre Patient Position Supine  -CS     Intra Patient Position Standing  -CS     Post Patient Position Sitting  -CS       Row Name 06/27/24 1039          Positioning and Restraints    Pre-Treatment Position in bed  -CS     Post Treatment Position bed  -CS     In Bed notified nsg;sitting EOB;with PT;call light within reach;encouraged to call for assist  -CS               User Key  (r) = Recorded By, (t) = Taken By, (c) = Cosigned By      Initials Name Provider Type    CS Angelica De La Paz, BRENDA Occupational Therapist                   Outcome Measures       Row Name 06/27/24 1056          How much help from another is currently needed...    Putting on and taking off regular lower body clothing? 3  -CS     Bathing (including washing, rinsing, and drying) 3  -CS     Toileting  (which includes using toilet bed pan or urinal) 3  -CS     Putting on and taking off regular upper body clothing 4  -CS     Taking care of personal grooming (such as brushing teeth) 4  -CS     Eating meals 4  -CS     AM-PAC 6 Clicks Score (OT) 21  -       Row Name 06/27/24 0800          How much help from another person do you currently need...    Turning from your back to your side while in flat bed without using bedrails? 4  -JW     Moving from lying on back to sitting on the side of a flat bed without bedrails? 4  -JW     Moving to and from a bed to a chair (including a wheelchair)? 3  -JW     Standing up from a chair using your arms (e.g., wheelchair, bedside chair)? 3  -JW     Climbing 3-5 steps with a railing? 3  -JW     To walk in hospital room? 3  -JW     AM-PAC 6 Clicks Score (PT) 20  -JW     Highest Level of Mobility Goal 6 --> Walk 10 steps or more  -       Row Name 06/27/24 1056          Modified Rock Scale    Pre-Stroke Modified Rock Scale 0 - No Symptoms at all.  -     Modified Giovanny Scale 2 - Slight disability.  Unable to carry out all previous activities but able to look after own affairs without assistance.  -       Row Name 06/27/24 1056          Functional Assessment    Outcome Measure Options AM-PAC 6 Clicks Daily Activity (OT);Modified Giovanny  -CS               User Key  (r) = Recorded By, (t) = Taken By, (c) = Cosigned By      Initials Name Provider Type     Angelica De La Paz OT Occupational Therapist    Kim Rodríguez, RN Registered Nurse                    Occupational Therapy Education       Title: PT OT SLP Therapies (In Progress)       Topic: Occupational Therapy (In Progress)       Point: ADL training (In Progress)       Description:   Instruct learner(s) on proper safety adaptation and remediation techniques during self care or transfers.   Instruct in proper use of assistive devices.                  Learning Progress Summary             Patient Acceptance, E, NR by ERVIN  at 6/27/2024 1056                         Point: Home exercise program (Not Started)       Description:   Instruct learner(s) on appropriate technique for monitoring, assisting and/or progressing therapeutic exercises/activities.                  Learner Progress:  Not documented in this visit.              Point: Precautions (In Progress)       Description:   Instruct learner(s) on prescribed precautions during self-care and functional transfers.                  Learning Progress Summary             Patient Acceptance, E, NR by  at 6/27/2024 1056                         Point: Body mechanics (In Progress)       Description:   Instruct learner(s) on proper positioning and spine alignment during self-care, functional mobility activities and/or exercises.                  Learning Progress Summary             Patient Acceptance, E, NR by  at 6/27/2024 1056                                         User Key       Initials Effective Dates Name Provider Type Discipline     09/02/21 -  Angelica De La Paz, OT Occupational Therapist OT                  OT Recommendation and Plan  Planned Therapy Interventions (OT): activity tolerance training, adaptive equipment training, BADL retraining, functional balance retraining, occupation/activity based interventions, ROM/therapeutic exercise, strengthening exercise, transfer/mobility retraining, cognitive/visual perception retraining, neuromuscular control/coordination retraining, patient/caregiver education/training  Therapy Frequency (OT): daily  Plan of Care Review  Plan of Care Reviewed With: patient  Progress: improving  Outcome Evaluation: OT eval complete. Pt presents w/ mild balance deficits and c/o R visual field blurriness though responded to prompts appropriately. Pt educated on compensatory stratgies and home safety techniques d/t c/o worsening dizziness/blurry vision with positional changes. Recommend cont skilled IPOT POC 1-2 more session to ensure carry-over of  education. Recommend pt DC home w/ assist.     Time Calculation:   Evaluation Complexity (OT)  Review Occupational Profile/Medical/Therapy History Complexity: brief/low complexity  Assessment, Occupational Performance/Identification of Deficit Complexity: 1-3 performance deficits  Clinical Decision Making Complexity (OT): problem focused assessment/low complexity  Overall Complexity of Evaluation (OT): low complexity     Time Calculation- OT       Row Name 06/27/24 1059             Time Calculation- OT    OT Start Time 0815  -CS      OT Received On 06/27/24  -CS      OT Goal Re-Cert Due Date 07/07/24  -CS         Untimed Charges    OT Eval/Re-eval Minutes 46  -CS         Total Minutes    Untimed Charges Total Minutes 46  -CS       Total Minutes 46  -CS                User Key  (r) = Recorded By, (t) = Taken By, (c) = Cosigned By      Initials Name Provider Type    CS Angelica De La Paz OT Occupational Therapist                  Therapy Charges for Today       Code Description Service Date Service Provider Modifiers Qty    [id number] HC OT EVAL LOW COMPLEXITY 4 6/27/2024 Angelica De La Paz OT GO 1                 Angelica De La Paz OT  6/27/2024

## 2024-06-27 NOTE — THERAPY EVALUATION
Patient Name: Edna Motley  : 1980    MRN: [id number]                              Today's Date: 2024       Admit Date: 2024    Visit Dx:     ICD-10-CM ICD-9-CM   1. Dissection of extracranial vertebral artery  I77.74 443.24     Patient Active Problem List   Diagnosis    Migraines    Family history of malignant neoplasm of breast    Massey syndrome    Malignant neoplasm of lower-outer quadrant of left breast of female, estrogen receptor negative    Well woman exam with routine gynecological exam    Bone pain    Vaginal dryness, menopausal    Lumbar discogenic pain syndrome    Spondylosis of lumbar region without myelopathy or radiculopathy    DDD (degenerative disc disease), lumbar    Myofascial pain    Mild obesity    Abnormal PET scan, mediastinum    History of left breast cancer    Hyperlipidemia    History of malignant neoplasm of breast    Dissection, vertebral artery     Past Medical History:   Diagnosis Date    Cluster headache All my life    It’s like it get better with medicine but then comes back    Constipation     CTS (carpal tunnel syndrome)     Depression Off and on    When I’m hurting more I get more down    Difficulty walking Worse back leg etc    Mostly mornings when I wake up or after sitting a long time    Endometriosis     When uterus removed was told had this    Generalized headaches     Headache, tension-type Everyday    Heart murmur     Hyperlipidemia 2023    Massey syndrome 2016    Massey syndrome     Malignant neoplasm of lower-outer quadrant of left female breast 2016    Migraines 2016    Multiple gestation     I have a 13 and 15 year old    Osteoarthritis I think I have this    Pain  I believe checked    Osteoporosis Pain everywhere    Pelvic pain 2016    Wears glasses     Well woman exam with routine gynecological exam 10/25/2017     Past Surgical History:   Procedure Laterality Date    BREAST BIOPSY  16    Result cancer     BREAST RECONSTRUCTION, BREAST TISSUE EXPANDER INSERTION Bilateral 11/01/2016    Procedure: BILATERAL BREAST IMMEDIATE RECONSTRUCTION, BREAST TISSUE EXPANDER INSERTION;  Surgeon: Kenny Massey MD;  Location:  [facility];  Service:     BREAST RECONSTRUCTION, BREAST TISSUE EXPANDER REMOVAL, IMPLANT INSERTION Left 06/09/2017    Procedure: PLACEMENT OF LEFT RECONSTRUCTED BREAST TISSUE EXPANDER ;  Surgeon: Kenny Massey MD;  Location:  [facility];  Service:     BREAST SURGERY      COLONOSCOPY  10/14/2016    DILATION AND CURETTAGE, DIAGNOSTIC / THERAPEUTIC      x2    HYSTERECTOMY      MASTECTOMY W/ SENTINEL NODE BIOPSY Bilateral 11/01/2016    Procedure: BILATERAL BREAST MASTECTOMY WITH LEFT SENTINEL NODE BIOPSY POSS AXILLARY NODE DISECTION;  Surgeon: Jose F Johnson MD;  Location:  [facility];  Service:     OOPHORECTOMY      TOTAL ABDOMINAL HYSTERECTOMY WITH SALPINGO OOPHORECTOMY  09/01/2011    Endometriosis    UPPER GASTROINTESTINAL ENDOSCOPY  11/19/2014    VENOUS ACCESS DEVICE (PORT) REMOVAL Right 03/17/2017    WISDOM TOOTH EXTRACTION  08/01/1999    3 teeth removed      General Information       Row Name 06/27/24 1441          Physical Therapy Time and Intention    Document Type evaluation  -KG     Mode of Treatment physical therapy  -KG       Row Name 06/27/24 1441          General Information    Patient Profile Reviewed yes  -KG     Prior Level of Function independent:;all household mobility;gait;transfer;ADL's;dressing;bathing  -KG     Existing Precautions/Restrictions fall;other (see comments)  R visual field blurry vision  -KG     Barriers to Rehab medically complex;visual deficit  -KG       Row Name 06/27/24 1441          Living Environment    People in Home spouse;child(christina), dependent  -KG       Row Name 06/27/24 1441          Home Main Entrance    Number of Stairs, Main Entrance two  -KG     Stair Railings, Main Entrance none  -KG       Row Name 06/27/24 1441          Stairs Within Home, Primary    Number  of Stairs, Within Home, Primary none  -KG       Row Name 06/27/24 1441          Cognition    Orientation Status (Cognition) oriented x 4  -KG       Row Name 06/27/24 1441          Safety Issues, Functional Mobility    Safety Issues Affecting Function (Mobility) awareness of need for assistance;insight into deficits/self-awareness;safety precaution awareness;safety precautions follow-through/compliance  -KG     Impairments Affecting Function (Mobility) coordination;endurance/activity tolerance;strength;visual/perceptual;pain  -KG               User Key  (r) = Recorded By, (t) = Taken By, (c) = Cosigned By      Initials Name Provider Type    KG Ness Ellison, PT Physical Therapist                   Mobility       Row Name 06/27/24 1442          Bed Mobility    Bed Mobility sit-supine  -KG     Sit-Supine [address] (Bed Mobility) supervision  -KG     Assistive Device (Bed Mobility) bed rails;head of bed elevated  -KG     Comment, (Bed Mobility) Pt demonstrated appropriate sequencing and technique.  -KG       Row Name 06/27/24 1442          Transfers    Comment, (Transfers) VC's for sequencing and safe hand placement.  -KG       Row Name 06/27/24 1442          Sit-Stand Transfer    Sit-Stand [address] (Transfers) contact guard;verbal cues  -KG       Row Name 06/27/24 1442          Gait/Stairs (Locomotion)    [address] Level (Gait) contact guard;1 person assist;1 person to manage equipment;verbal cues  -KG     Assistive Device (Gait) other (see comments)  UE support on tele monitor  -KG     Distance in Feet (Gait) 30  -KG     Deviations/Abnormal Patterns (Gait) tasha decreased;stride length decreased  -KG     Bilateral Gait Deviations heel strike decreased  -KG     Comment, (Gait/Stairs) Pt demonstrated step through gait pattern with slow tasha and decreased step length. Pt with good balance and stability. Limited by c/o blurry vision mostly in R eye; worse during turns.  -KG               User Key   (r) = Recorded By, (t) = Taken By, (c) = Cosigned By      Initials Name Provider Type    KG Ness Ellison PT Physical Therapist                   Obj/Interventions       Row Name 06/27/24 1443          Range of Motion Comprehensive    General Range of Motion no range of motion deficits identified  -KG     Comment, General Range of Motion B LE WFL  -KG       Row Name 06/27/24 1443          Strength Comprehensive (MMT)    Comment, General Manual Muscle Testing (MMT) Assessment B LE grossly 3+/5  -KG       Row Name 06/27/24 1443          Balance    Balance Assessment sitting static balance;standing static balance;standing dynamic balance  -KG     Static Sitting Balance standby assist  -KG     Position, Sitting Balance unsupported;sitting edge of bed  -KG     Static Standing Balance contact guard  -KG     Dynamic Standing Balance contact guard  -KG     Position/Device Used, Standing Balance supported  -KG       Row Name 06/27/24 1443          Sensory Assessment (Somatosensory)    Sensory Assessment (Somatosensory) LE sensation intact  -KG               User Key  (r) = Recorded By, (t) = Taken By, (c) = Cosigned By      Initials Name Provider Type    KG Ness Ellison PT Physical Therapist                   Goals/Plan       Row Name 06/27/24 1445          Bed Mobility Goal 1 (PT)    Activity/Assistive Device (Bed Mobility Goal 1, PT) sit to supine;supine to sit  -KG     [place] Level/Cues Needed (Bed Mobility Goal 1, PT) independent  -KG     Time Frame (Bed Mobility Goal 1, PT) short term goal (STG);3 days  -KG     Progress/Outcomes (Bed Mobility Goal 1, PT) goal ongoing  -KG       Row Name 06/27/24 1445          Transfer Goal 1 (PT)    Activity/Assistive Device (Transfer Goal 1, PT) sit-to-stand/stand-to-sit;bed-to-chair/chair-to-bed  -KG     [place] Level/Cues Needed (Transfer Goal 1, PT) independent  -KG     Time Frame (Transfer Goal 1, PT) long term goal (LTG);1 week  -KG      Progress/Outcome (Transfer Goal 1, PT) goal ongoing  -KG       Row Name 06/27/24 1445          Gait Training Goal 1 (PT)    Activity/Assistive Device (Gait Training Goal 1, PT) gait (walking locomotion)  -KG     [place] Level (Gait Training Goal 1, PT) independent  -KG     Distance (Gait Training Goal 1, PT) 400 feet  -KG     Time Frame (Gait Training Goal 1, PT) long term goal (LTG);1 week  -KG     Progress/Outcome (Gait Training Goal 1, PT) goal ongoing  -KG       Row Name 06/27/24 1445          Stairs Goal 1 (PT)    Activity/Assistive Device (Stairs Goal 1, PT) ascending stairs;descending stairs;step-to-step  -KG     [place] Level/Cues Needed (Stairs Goal 1, PT) supervision required  -KG     Number of Stairs (Stairs Goal 1, PT) 2  -KG     Time Frame (Stairs Goal 1, PT) long term goal (LTG);1 week  -KG     Progress/Outcome (Stairs Goal 1, PT) goal ongoing  -KG       Row Name 06/27/24 1445          Therapy Assessment/Plan (PT)    Planned Therapy Interventions (PT) balance training;bed mobility training;gait training;stair training;strengthening;transfer training  -KG               User Key  (r) = Recorded By, (t) = Taken By, (c) = Cosigned By      Initials Name Provider Type    KG Ness Ellison N, PT Physical Therapist                   Clinical Impression       Row Name 06/27/24 1447          Pain    Pretreatment Pain Rating 5/10  -KG     Posttreatment Pain Rating 5/10  -KG     Pain Location - head  -KG     Pain Intervention(s) Repositioned;Ambulation/increased activity  -KG       Row Name 06/27/24 1444          Plan of Care Review    Plan of Care Reviewed With patient  -KG     Outcome Evaluation PT initial evaluation completed for pt presenting with generalized weakness, mild balance impairment, visual deficit, and decreased functional mobility. Pt ambulated 30ft with CGA 1+1 and UE support on tele monitor. Pt's decreased independence warrants PT skilled care. Recommend D/C home with assistance.   -KG       Row Name 06/27/24 1443          Therapy Assessment/Plan (PT)    Patient/Family Therapy Goals Statement (PT) return to PLOF  -KG     Rehab Potential (PT) good, to achieve stated therapy goals  -KG     Criteria for Skilled Interventions Met (PT) yes;skilled treatment is necessary  -KG     Therapy Frequency (PT) daily  -KG     Predicted Duration of Therapy Intervention (PT) 7 days  -KG       Row Name 06/27/24 1443          Vital Signs    Pre Systolic BP Rehab 119  -KG     Pre Treatment Diastolic BP 85  -KG     Post Systolic BP Rehab 146  -KG     Post Treatment Diastolic   -KG     Pretreatment Heart Rate (beats/min) 59  -KG     Posttreatment Heart Rate (beats/min) 65  -KG     Pre SpO2 (%) 97  -KG     O2 Delivery Pre Treatment room air  -KG     Post SpO2 (%) 98  -KG     O2 Delivery Post Treatment room air  -KG     Pre Patient Position Sitting  -KG     Intra Patient Position Standing  -KG     Post Patient Position Supine  -KG       Row Name 06/27/24 1443          Positioning and Restraints    Pre-Treatment Position in bed  -KG     Post Treatment Position bed  -KG     In Bed notified nsg;supine;call light within reach;encouraged to call for assist;exit alarm on;side rails up x3  -KG               User Key  (r) = Recorded By, (t) = Taken By, (c) = Cosigned By      Initials Name Provider Type    KG Ness Ellison, PT Physical Therapist                   Outcome Measures       Row Name 06/27/24 1445 06/27/24 0800       How much help from another person do you currently need...    Turning from your back to your side while in flat bed without using bedrails? 3  -KG 4  -JW    Moving from lying on back to sitting on the side of a flat bed without bedrails? 3  -KG 4  -JW    Moving to and from a bed to a chair (including a wheelchair)? 3  -KG 3  -JW    Standing up from a chair using your arms (e.g., wheelchair, bedside chair)? 3  -KG 3  -JW    Climbing 3-5 steps with a railing? 3  -KG 3  -JW    To walk in  hospital room? 3  -KG 3  -JW    AM-PAC 6 Clicks Score (PT) 18  -KG 20  -JW    Highest Level of Mobility Goal 6 --> Walk 10 steps or more  -KG 6 --> Walk 10 steps or more  -      Row Name 06/27/24 1445 06/27/24 1056       Modified [place] Scale    Pre-Stroke Modified [place] Scale 6 - Unable to determine (UTD) from the medical record documentation  -KG 0 - No Symptoms at all.  -CS    Modified [place] Scale 2 - Slight disability.  Unable to carry out all previous activities but able to look after own affairs without assistance.  -KG 2 - Slight disability.  Unable to carry out all previous activities but able to look after own affairs without assistance.  -      Row Name 06/27/24 1445 06/27/24 1056       Functional Assessment    Outcome Measure Options AM-PAC 6 Clicks Basic Mobility (PT);Modified [place]  -KG AM-PAC 6 Clicks Daily Activity (OT);Modified [place]  -CS              User Key  (r) = Recorded By, (t) = Taken By, (c) = Cosigned By      Initials Name Provider Type    CS Angelica De La Paz, OT Occupational Therapist    Ness Lees, PT Physical Therapist    Kim Rodríguez, RN Registered Nurse                                 Physical Therapy Education       Title: PT OT SLP Therapies (In Progress)       Topic: Physical Therapy (In Progress)       Point: Mobility training (Done)       Learning Progress Summary             Patient Acceptance, E, VU,NR by KG at 6/27/2024 0841                         Point: Home exercise program (Not Started)       Learner Progress:  Not documented in this visit.              Point: Body mechanics (Done)       Learning Progress Summary             Patient Acceptance, E, VU,NR by KG at 6/27/2024 0841                         Point: Precautions (Done)       Learning Progress Summary             Patient Acceptance, E, VU,NR by KG at 6/27/2024 0841                                         User Key       Initials Effective Dates Name Provider Type Discipline    KG 05/22/20 -   Ness Ellison, PT Physical Therapist PT                  PT Recommendation and Plan  Planned Therapy Interventions (PT): balance training, bed mobility training, gait training, stair training, strengthening, transfer training  Plan of Care Reviewed With: patient  Outcome Evaluation: PT initial evaluation completed for pt presenting with generalized weakness, mild balance impairment, visual deficit, and decreased functional mobility. Pt ambulated 30ft with CGA 1+1 and UE support on tele monitor. Pt's decreased independence warrants PT skilled care. Recommend D/C home with assistance.     Time Calculation:   PT Evaluation Complexity  History, PT Evaluation Complexity: 3 or more personal factors and/or comorbidities  Examination of Body Systems (PT Eval Complexity): total of 3 or more elements  Clinical Presentation (PT Evaluation Complexity): evolving  Clinical Decision Making (PT Evaluation Complexity): moderate complexity  Overall Complexity (PT Evaluation Complexity): moderate complexity     PT Charges       Row Name 06/27/24 0841             Time Calculation    Start Time 0841  -KG      PT Received On 06/27/24  -KG      PT Goal Re-Cert Due Date 07/07/24  -KG         Untimed Charges    PT Eval/Re-eval Minutes 46  -KG         Total Minutes    Untimed Charges Total Minutes 46  -KG       Total Minutes 46  -KG                User Key  (r) = Recorded By, (t) = Taken By, (c) = Cosigned By      Initials Name Provider Type    KG Ness Ellison, PT Physical Therapist                  Therapy Charges for Today       Code Description Service Date Service Provider Modifiers Qty    [id number]  PT EVAL MOD COMPLEXITY 4 6/27/2024 Ness Ellison, PT GP 1            PT G-Codes  Outcome Measure Options: AM-PAC 6 Clicks Basic Mobility (PT), Modified [place]  AM-PAC 6 Clicks Score (PT): 18  AM-PAC 6 Clicks Score (OT): 21  Modified [place] Scale: 2 - Slight disability.  Unable to carry out all previous activities  but able to look after own affairs without assistance.  PT Discharge Summary  Anticipated Discharge Disposition (PT): home with assist    Eneida Ellison, PT  6/27/2024

## 2024-06-27 NOTE — PLAN OF CARE
Goal Outcome Evaluation:  Plan of Care Reviewed With: patient           Outcome Evaluation: PT initial evaluation completed for pt presenting with generalized weakness, mild balance impairment, visual deficit, and decreased functional mobility. Pt ambulated 30ft with CGA 1+1 and UE support on tele monitor. Pt's decreased independence warrants PT skilled care. Recommend D/C home with assistance.      Anticipated Discharge Disposition (PT): home with assist

## 2024-06-27 NOTE — SIGNIFICANT NOTE
Upon completing MRI that revealed patient had multiple multifocal punctate/infarct right cerebellum, left pontine infarct images were discussed with Dr. Franklin neurology attending as well as discussed with Dr. Kincaid neurology [id number] today , and to continue heparin drip no bolus, repeat CT head in the a.m., systolic blood pressure less than 180.  Discussed images and report with patient's, no family present at this time.  Patient is well was seen and examined in her room in the ICU, patient is drowsy\sleepy eyes closed however responsive to verbal commands, follow commands 2 steps.  Patient is alert and oriented to herself and situation and age reports mild headache 4/10 septal which is tolerable, no nausea or vomiting.  No focal neurological deficit no vision changes or deficit at this time, NIH for me 1 per my exam.    Interval: baseline  1a. Level of Consciousness: 1-->Not alert, but arousable by minor stimulation to obey, answer, or respond  1b. LOC Questions: 0-->Answers both questions correctly  1c. LOC Commands: 0-->Performs both tasks correctly  2. Best Gaze: 0-->Normal  3. Visual: 0-->No visual loss  4. Facial Palsy: 0-->Normal symmetrical movements  5a. Motor Arm, Left: 0-->No drift, limb holds 90 (or 45) degrees for full 10 secs  5b. Motor Arm, Right: 0-->No drift, limb holds 90 (or 45) degrees for full 10 secs  6a. Motor Leg, Left: 0-->No drift, leg holds 30 degree position for full 5 secs  6b. Motor Leg, Right: 0-->No drift, leg holds 30 degree position for full 5 secs  7. Limb Ataxia: 0-->Absent  8. Sensory: 0-->Normal, no sensory loss  9. Best Language: 0-->No aphasia, normal  10. Dysarthria: 0-->Normal  11. Extinction and Inattention (formerly Neglect): 0-->No abnormality    Total (NIH Stroke Scale): 1     MRI Brain Without Contrast    Result Date: 6/26/2024  MRI BRAIN WO CONTRAST Date of Exam: 6/26/2024 7:20 PM EDT Indication: weakness, poss stroke.  Comparison: 6/26/2024 Technique:   Routine multiplanar/multisequence sequence images of the brain were obtained without contrast administration. Findings: Multiple lacunar infarcts of the right cerebellar hemisphere with a prominent infarct also in the right cerebellar hemisphere. No definite abnormality identified by CT. Associated ADC signal dropout. Increased FLAIR/T2 signal changes are noted indicative of an infarct of at least 6 hours duration. No intracranial mass or hemorrhage. No extra-axial mass or collection. The ventricles and sulci are normal in size and configuration. Orbital and periorbital soft tissues are normal. The paranasal sinuses are clear. Fluid within the left mastoid air cells.     Impression: Multiple infarcts of the right cerebellar hemisphere predominantly lacunar. Electronically Signed: Otoniel Pinzon MD  6/26/2024 8:06 PM EDT  Workstation ID: [id number]      Plan to continue heparin drip no bolus, systolic blood pressure less than 180, repeat CT head in the a.m.  Discussed with  neurology attending.

## 2024-06-27 NOTE — CASE MANAGEMENT/SOCIAL WORK
Discharge Planning Assessment  [facility]     Patient Name: Edna Motley  MRN: [id number]  Today's Date: 6/27/2024    Admit Date: 6/26/2024    Plan: Home with family   Discharge Needs Assessment       Row Name 06/27/24 0817       Living Environment    People in Home spouse    Name(s) of People in Home Gualberto (spouse) [phone]    Current Living Arrangements home    Potentially Unsafe Housing Conditions none    Primary Care Provided by self    Provides Primary Care For no one    Family Caregiver if Needed spouse    Able to Return to Prior Arrangements yes       Resource/Environmental Concerns    Resource/Environmental Concerns none    Transportation Concerns none       Transition Planning    Patient/Family Anticipates Transition to home with family    Patient/Family Anticipated Services at Transition none    Transportation Anticipated family or friend will provide       Discharge Needs Assessment    Readmission Within the Last 30 Days no previous admission in last 30 days    Equipment Currently Used at Home none    Concerns to be Addressed denies needs/concerns at this time    Anticipated Changes Related to Illness none    Equipment Needed After Discharge none                   Discharge Plan       Row Name 06/27/24 0818       Plan    Plan Home with family    Patient/Family in Agreement with Plan yes    Plan Comments Spoke to patient at bedside. Lives with Gualberto (spouse) [phone] in [place]. Was independent with ADL's. No problems affording [place] [employer] or medications. Uses no DME. No advanced directives. PCP is José Luis Redman MD. Plan is home with family. CM will continue to follow.    Final Discharge Disposition Code 01 - home or self-care                  Continued Care and Services - Admitted Since 6/26/2024    No active coordination exists for this encounter.       Selected Continued Care - Episodes Includes continued care and service providers with selected services from the active episodes  listed below      Chronic Migraine - External Program Episode start date: 5/21/2024   There are no active outsourced providers for this episode.                    Demographic Summary       Row Name 06/27/24 0817       General Information    Admission Type inpatient    Arrived From emergency department    Referral Source admission list    Reason for Consult discharge planning    Preferred Language English       Contact Information    Permission Granted to Share Info With     Contact Information Obtained for                    Functional Status       Row Name 06/27/24 0817       Functional Status    Usual Activity Tolerance excellent    Current Activity Tolerance excellent       Functional Status, IADL    Medications independent    Meal Preparation independent    Housekeeping independent    Laundry independent    Shopping independent       Mental Status    General Appearance WDL WDL       Mental Status Summary    Recent Changes in Mental Status/Cognitive Functioning no changes       Employment/    Employment Status employed full-time                   Psychosocial    No documentation.                  Abuse/Neglect    No documentation.                  Legal    No documentation.                  Substance Abuse    No documentation.                  Patient Forms    No documentation.                     Gerry Colmenares RN

## 2024-06-27 NOTE — PROGRESS NOTES
INTENSIVIST   PROGRESS NOTE     Hospital:  LOS: 1 day     Ms. Edna Motley, 43 y.o. female is followed for a Chief Complaint of: CVA      Subjective   S     Interval History:  No acute events.        The patient's relevant past medical, surgical and social history were reviewed and updated in Epic as appropriate.      ROS:   Constitutional: Negative for fever.   Respiratory: Negative for dyspnea.   Cardiovascular: Negative for chest pain.   Gastrointestinal: Negative for  nausea, vomiting and diarrhea.     Objective   O     Vitals:  Temp  Min: 97.6 °F (36.4 °C)  Max: 98 °F (36.7 °C)  BP  Min: 100/75  Max: 163/111  Pulse  Min: 58  Max: 80  Resp  Min: 16  Max: 20  SpO2  Min: 92 %  Max: 98 % No data recorded    Intake/Ouptut 24 hrs (7:00AM - 6:59 AM)  Intake & Output (last 3 days)         06/24 0701  06/25 0700 06/25 0701  06/26 0700 06/26 0701 06/27 0700 06/27 0701 06/28 0700    P.O.    354    I.V. (mL/kg)   159.6 (1.8)     Total Intake(mL/kg)   159.6 (1.8) 354 (3.9)    Urine (mL/kg/hr)    1000 (1.2)    Total Output    1000    Net   +159.6 -646            Urine Unmeasured Occurrence   2 x             Medications (drips):  heparin, Last Rate: 9 Units/kg/hr (06/27/24 1346)  niCARdipine  Pharmacy to Dose Heparin          Physical Examination  Telemetry:  Normal sinus rhythm.    Constitutional:  No acute distress.  Resting in bed with the lights off.    Eyes: No scleral icterus.   PERRL, EOM intact.    Neck:  Supple, FROM   Cardiovascular: Normal rate, regular and rhythm. Normal heart sounds.  No murmurs, gallop or rub.   Respiratory: No respiratory distress. Normal respiratory effort.  Normal breath sounds  Clear to auscultation   Abdominal:  Soft. No masses. Nontender. No distension. No HSM.   Extremities: No digital cyanosis. No clubbing.  No peripheral edema.   Skin: No rashes, lesions or ulcers   Neurological:   Arouses to stimulation and is interactive.              Results from last 7 days   Lab Units  06/27/24  0520 06/26/24  1631   WBC 10*3/mm3 8.61 10.63   HEMOGLOBIN g/dL 13.9 15.9   MCV fL 89.1 90.8   PLATELETS 10*3/mm3 227 254     Results from last 7 days   Lab Units 06/27/24  0520 06/26/24  1631   SODIUM mmol/L 136 139  138   POTASSIUM mmol/L 4.1 3.8   CO2 mmol/L 27.0 27.0   CREATININE mg/dL 0.66 0.88   GLUCOSE mg/dL 97 120*   MAGNESIUM mg/dL 2.6 2.6   PHOSPHORUS mg/dL 4.1  --      Estimated Creatinine Clearance: 124.8 mL/min (by C-G formula based on SCr of 0.66 mg/dL).  Results from last 7 days   Lab Units 06/27/24  0520 06/26/24  1631   ALK PHOS U/L 74 90   BILIRUBIN mg/dL 0.5 0.5   ALT (SGPT) U/L 23 29   AST (SGOT) U/L 15 18             Images:  Imaging Results (Last 24 Hours)       Procedure Component Value Units Date/Time    CT Head Without Contrast [[id number]] Collected: 06/27/24 0750     Updated: 06/27/24 0758    Narrative:      CT HEAD WO CONTRAST    Date of Exam: 6/27/2024 4:08 AM EDT    Indication: stroke follow up.    Comparison: CT brain and MR brain of 1 day prior..    Technique: Axial CT images were obtained of the head without contrast administration.  Automated exposure control and iterative construction methods were used.      Findings:  There is a low-density lesion of the right posterior cerebellar hemisphere which is new as compared to the prior CT and corresponds to an infarct seen on MR imaging. Other smaller infarcts of the right cerebellum seen on the MR exam are not evident on   the CT brain. There is no hemorrhage. There is no acute mass effect. There is no midline shift. Ventricles are normal in size and shape.      Impression:      Impression:  Subacute nonhemorrhagic right cerebellar hemisphere CVA. Other smaller CVAs seen by MR imaging are not evident on the CT brain exam.        Electronically Signed: Pati Alicea MD    6/27/2024 7:55 AM EDT    Workstation ID: [id number]    MRI Brain Without Contrast [[id number]] Collected: 06/26/24 2003     Updated: 06/26/24 2009     Narrative:      MRI BRAIN WO CONTRAST    Date of Exam: 6/26/2024 7:20 PM EDT    Indication: weakness, poss stroke.     Comparison: 6/26/2024    Technique:  Routine multiplanar/multisequence sequence images of the brain were obtained without contrast administration.    Findings: Multiple lacunar infarcts of the right cerebellar hemisphere with a prominent infarct also in the right cerebellar hemisphere. No definite abnormality identified by CT. Associated ADC signal dropout. Increased FLAIR/T2 signal changes are noted   indicative of an infarct of at least 6 hours duration. No intracranial mass or hemorrhage. No extra-axial mass or collection. The ventricles and sulci are normal in size and configuration.    Orbital and periorbital soft tissues are normal. The paranasal sinuses are clear. Fluid within the left mastoid air cells.      Impression:      Multiple infarcts of the right cerebellar hemisphere predominantly lacunar.              Electronically Signed: Otoniel Pinzon MD    6/26/2024 8:06 PM EDT    Workstation ID: [id number]    CT Head Without Contrast Stroke Protocol [[id number]] Collected: 06/26/24 1807     Updated: 06/26/24 1818    Narrative:      CT HEAD WO CONTRAST STROKE PROTOCOL    Date of Exam: 6/26/2024 5:57 PM EDT    Indication: eval for stroke.    Comparison: CTA of the head and neck from earlier today, MRI of the brain with and without contrast dated 12/17/2021    Technique: Axial CT images were obtained of the head without contrast administration.  Reconstructed coronal images were also obtained. Automated exposure control and iterative construction methods were used.    Scan Time: 1757  Results discussed with Poonam stroke navigator at 1814.      Findings:    There is no CT evidence of acute hemorrhage, infarct, mass, mass effect, or midline shift. The gray-white matter differentiation is preserved. There is no hydrocephalus. The sulci and ventricles are symmetric.  No extraaxial fluid collections.  The globes   and orbital contents are normal and symmetric. The mastoid air cells and visualized paranasal sinuses are clear. No skull fractures or suspicious calvarial lesions.       Impression:      Impression:  No acute intracranial abnormality. 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 Please refer to the CTA of the head and neck report from earlier today.        Electronically Signed: Jamesjuan Montez DO    6/26/2024 6:15 PM EDT    Workstation ID: [id number]    CT Angiogram Head [[id number]] Collected: 06/26/24 1709     Updated: 06/26/24 1728    Narrative:      CT ANGIOGRAM NECK, CT ANGIOGRAM HEAD    Date of Exam: 6/26/2024 4:55 PM EDT    Indication: dizziness, post chiropractor manipulation.    Comparison: None available.    Technique: CTA of the head and neck was performed before and after the uneventful intravenous administration of Isovue-370 IV contrast . Reconstructed coronal and sagittal images were also obtained. In addition, a 3-D volume rendered image was created   for interpretation. Automated exposure control and iterative reconstruction methods were used.     FINDINGS: The arch of the aorta is normal. The common carotid arteries are normal. The extracranial internal carotid arteries are normal. The intracranial segments of the internal carotid arteries are normal. The carotid termini are normal. The   visualized branches of the anterior and middle cerebral arteries appear normal.    Right vertebral artery dissection extending from the C3 vertebral body level extending superiorly into the right C2 foramina transversaria. Cranial to this level the right vertebral artery is narrowed which extends to the foramen magnum through the   foramina transversarium of C1 consistent with mural thrombus. Narrowing of the left vertebral artery is also present extending from the level of C2 extending superiorly  within the left foramina transversarium of C1 with normal caliber at the level of the   foramen magnum also suggesting left-sided vertebral artery dissection with mural thrombus. Both vertebral arteries supply the basilar artery. There appears to be an intracranial component of the vertebral artery dissection involving the basilar artery   best seen on axial image #408 series 8.     The visualized lung apices are clear. The airway is clear. The thyroid gland is normal. No cervical adenopathy. The infratemporal fossa is normal bilaterally.    No intracranial mass or mass effect. No midline shift. No extra-axial mass or collection. Orbital and paranasal soft tissues are normal. The paranasal sinuses are clear. The mastoid air cells are aerated. Bilateral breast implants.      Impression:      Bilateral extracranial vertebral artery dissections as above. The basilar artery likewise appears dissected.    Findings discussed with Dr. Hunt at 5:22 p.m. on 6/26/2024          Electronically Signed: Otoniel Pinzon MD    6/26/2024 5:25 PM EDT    Workstation ID: [id number]    CT Angiogram Neck [[id number]] Collected: 06/26/24 1709     Updated: 06/26/24 1728    Narrative:      CT ANGIOGRAM NECK, CT ANGIOGRAM HEAD    Date of Exam: 6/26/2024 4:55 PM EDT    Indication: dizziness, post chiropractor manipulation.    Comparison: None available.    Technique: CTA of the head and neck was performed before and after the uneventful intravenous administration of Isovue-370 IV contrast . Reconstructed coronal and sagittal images were also obtained. In addition, a 3-D volume rendered image was created   for interpretation. Automated exposure control and iterative reconstruction methods were used.     FINDINGS: The arch of the aorta is normal. The common carotid arteries are normal. The extracranial internal carotid arteries are normal. The intracranial segments of the internal carotid arteries are normal. The carotid termini are normal. The    visualized branches of the anterior and middle cerebral arteries appear normal.    Right vertebral artery dissection extending from the C3 vertebral body level extending superiorly into the right C2 foramina transversaria. Cranial to this level the right vertebral artery is narrowed which extends to the foramen magnum through the   foramina transversarium of C1 consistent with mural thrombus. Narrowing of the left vertebral artery is also present extending from the level of C2 extending superiorly within the left foramina transversarium of C1 with normal caliber at the level of the   foramen magnum also suggesting left-sided vertebral artery dissection with mural thrombus. Both vertebral arteries supply the basilar artery. There appears to be an intracranial component of the vertebral artery dissection involving the basilar artery   best seen on axial image #408 series 8.     The visualized lung apices are clear. The airway is clear. The thyroid gland is normal. No cervical adenopathy. The infratemporal fossa is normal bilaterally.    No intracranial mass or mass effect. No midline shift. No extra-axial mass or collection. Orbital and paranasal soft tissues are normal. The paranasal sinuses are clear. The mastoid air cells are aerated. Bilateral breast implants.      Impression:      Bilateral extracranial vertebral artery dissections as above. The basilar artery likewise appears dissected.    Findings discussed with Dr. Hunt at 5:22 p.m. on 6/26/2024          Electronically Signed: Otoniel Pinzon MD    6/26/2024 5:25 PM EDT    Workstation ID: [id number]    XR Chest 1 View [[id number]] Collected: 06/26/24 1636     Updated: 06/26/24 1641    Narrative:      XR CHEST 1 VW    Date of Exam: 6/26/2024 4:28 PM EDT    Indication: Weak/Dizzy/AMS triage protocol    Comparison: Chest radiograph 11/22/2016    Findings:  Heart shadow appears in the upper range of normal size and may be exaggerated by lower lung volumes on  today's study. Vasculature appears normal. Slight coarsening of the basilar interstitial markings is thought to reflect overlying breast tissue shadow.   No lung consolidation, effusion or pneumothorax is seen there appear to be a couple surgical clips superimposed over the right upper chest, left hilum and left lateral chest.      Impression:      Impression:  No evidence of active chest disease.      Electronically Signed: Jason Nguyen MD    6/26/2024 4:38 PM EDT    Workstation ID: [id number]               Results: Reviewed.  I reviewed the patient's new laboratory and imaging results.  I independently reviewed the patient's new images.    Medications: Reviewed.    Assessment & Plan   A / P     Ms. Motley is a 42yo F with a history of Massey Syndrome, breast cancer and chronic migraines who [facility] to [facility] ED on 6/26 via EMS from her chiropractor's office after developing acute onset dizziness, nausea and vomiting. 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 She was seen by Neurology and Neurosurgery who recommended blood pressure control and a heparin infusion.     Nutrition:   Diet: Regular/House; Fluid Consistency: Thin (IDDSI 0)  Advance Directives:   Code Status and Medical Interventions:   Ordered at: 06/27/24 0036     Code Status (Patient has no pulse and is not breathing):    CPR (Attempt to Resuscitate)     Medical Interventions (Patient has pulse or is breathing):    Full Support       Active Hospital Problems    Diagnosis     **Dissection, vertebral artery     Migraines        Assessment / Plan:    Continue ICU care.   Continue heparin drip.    Neurology and Neurosurgery following.   Monitor sodium levels and avoid hyponatremia.   Repeat MRI for change in neurologic status.   Blood pressure control for SBP < 180.   AM labs    High level of risk due to:  severe exacerbation of chronic illness and illness with threat to life or bodily function.    Plan of  care and goals reviewed during interdisciplinary rounds.  I discussed the patient's findings and my recommendations with patient and nursing staff      Rashmi Schultz DO    Intensive Care Medicine and Pulmonary Medicine

## 2024-06-27 NOTE — THERAPY DISCHARGE NOTE
Acute Care - Speech Language Pathology   Initial Evaluation/Discharge  [facility]  Cognitive-Communication Evaluation       Patient Name: Edna Motley  : 1980  MRN: [id number]  Today's Date: 2024               Admit Date: 2024     Visit Dx:    ICD-10-CM ICD-9-CM   1. Dissection of extracranial vertebral artery  I77.74 443.24     Patient Active Problem List   Diagnosis    Migraines    Family history of malignant neoplasm of breast    Massey syndrome    Malignant neoplasm of lower-outer quadrant of left breast of female, estrogen receptor negative    Well woman exam with routine gynecological exam    Bone pain    Vaginal dryness, menopausal    Lumbar discogenic pain syndrome    Spondylosis of lumbar region without myelopathy or radiculopathy    DDD (degenerative disc disease), lumbar    Myofascial pain    Mild obesity    Abnormal PET scan, mediastinum    History of left breast cancer    Hyperlipidemia    History of malignant neoplasm of breast    Dissection, vertebral artery     Past Medical History:   Diagnosis Date    Cluster headache All my life    It’s like it get better with medicine but then comes back    Constipation     CTS (carpal tunnel syndrome)     Depression Off and on    When I’m hurting more I get more down    Difficulty walking Worse back leg etc    Mostly mornings when I wake up or after sitting a long time    Endometriosis     When uterus removed was told had this    Generalized headaches     Headache, tension-type Everyday    Heart murmur     Hyperlipidemia 2023    Massey syndrome 2016    Massey syndrome     Malignant neoplasm of lower-outer quadrant of left female breast 2016    Migraines 2016    Multiple gestation     I have a 13 and 15 year old    Osteoarthritis I think I have this    Pain  I believe checked    Osteoporosis Pain everywhere    Pelvic pain 2016    Wears glasses     Well woman exam with routine gynecological exam 10/25/2017      Past Surgical History:   Procedure Laterality Date    BREAST BIOPSY  9/22/16    Result cancer    BREAST RECONSTRUCTION, BREAST TISSUE EXPANDER INSERTION Bilateral 11/01/2016    Procedure: BILATERAL BREAST IMMEDIATE RECONSTRUCTION, BREAST TISSUE EXPANDER INSERTION;  Surgeon: Kenny Massey MD;  Location:  [facility];  Service:     BREAST RECONSTRUCTION, BREAST TISSUE EXPANDER REMOVAL, IMPLANT INSERTION Left 06/09/2017    Procedure: PLACEMENT OF LEFT RECONSTRUCTED BREAST TISSUE EXPANDER ;  Surgeon: Kenny Massey MD;  Location:  [facility];  Service:     BREAST SURGERY      COLONOSCOPY  10/14/2016    DILATION AND CURETTAGE, DIAGNOSTIC / THERAPEUTIC      x2    HYSTERECTOMY      MASTECTOMY W/ SENTINEL NODE BIOPSY Bilateral 11/01/2016    Procedure: BILATERAL BREAST MASTECTOMY WITH LEFT SENTINEL NODE BIOPSY POSS AXILLARY NODE DISECTION;  Surgeon: Jose F Johnson MD;  Location:  [facility];  Service:     OOPHORECTOMY      TOTAL ABDOMINAL HYSTERECTOMY WITH SALPINGO OOPHORECTOMY  09/01/2011    Endometriosis    UPPER GASTROINTESTINAL ENDOSCOPY  11/19/2014    VENOUS ACCESS DEVICE (PORT) REMOVAL Right 03/17/2017    WISDOM TOOTH EXTRACTION  08/01/1999    3 teeth removed       SLP Recommendation and Plan  SLP Diagnosis: functional speech/language skills, functional cognitive-linguistic skills (06/27/24 1050)        [facility] Criteria for Skilled Therapy Interventions Met: no problems identified which require skilled intervention (06/27/24 1050)  Anticipated Discharge Disposition (SLP): No further SLP services warranted (06/27/24 1050)     Therapy Frequency (SLP SLC): evaluation only (06/27/24 1050)                    Reason for Discharge: all goals and outcomes met, no further needs identified (06/27/24 1050)                     SLP EVALUATION (Last 72 Hours)       SLP SLC Evaluation       Row Name 06/27/24 1050                   Communication Assessment/Intervention    Document Type discharge evaluation/summary  -         Subjective Information no complaints  -        Patient Observations alert;cooperative;agree to therapy  -        Patient/Family/Caregiver Comments/Observations none present  -        Patient Effort excellent  -        Symptoms Noted During/After Treatment none  -           General Information    Patient Profile Reviewed yes  -        Precautions/Limitations, Vision WFL;for purposes of eval  -        Precautions/Limitations, Hearing WFL;for purposes of eval  -        Prior Level of Function-Communication WFL  -        Plans/Goals Discussed with patient;agreed upon  -        Barriers to Rehab none identified  -        Patient's Goals for Discharge patient did not state  -           Pain    Additional Documentation Pain Scale: FACES Pre/Post-Treatment (Group)  -           Pain Scale: FACES Pre/Post-Treatment    Pain: FACES Scale, Pretreatment 0-->no hurt  -        Posttreatment Pain Rating 0-->no hurt  -           Comprehension Assessment/Intervention    Comprehension Assessment/Intervention Auditory Comprehension;Reading Comprehension  -           Auditory Comprehension Assessment/Intervention    Auditory Comprehension (Communication) WFL  -        Narrative Discourse WFL  -           Reading Comprehension Assessment/Intervention    Reading Comprehension (Communication) WFL  -        Paragraph Level [facility]  -           Expression Assessment/Intervention    Expression Assessment/Intervention verbal expression  -           Verbal Expression Assessment/Intervention    Verbal Expression L  -        Conversational Discourse/Fluency WFL  -           Oral Musculature and Cranial Nerve Assessment    Oral Motor General Assessment WFL  -           Motor Speech Assessment/Intervention    Motor Speech Function WFL  -        Conversational Speech (Communication) [facility]  -        Speech intelligibility 100%;in quiet environment;in connected speech;with unfamiliar listener  -            Cursory Voice Assessment/Intervention    Quality and Resonance (Voice) WFL  -           Cognitive Assessment Intervention- SLP    Cognitive Function (Cognition) WFL  -        Orientation Status (Cognition) WFL  -        Memory (Cognitive) WFL  -CH        Attention (Cognitive) WFL  -CH        Thought Organization (Cognitive) WFL  -        Reasoning (Cognitive) WFL  -        Problem Solving (Cognitive) WFL  -        Functional Math (Cognitive) WFL  -        Executive Function (Cognition) WFL  -        Pragmatics (Communication) WFL  -           SLP Evaluation Clinical Impressions    SLP Diagnosis functional speech/language skills;functional cognitive-linguistic skills  -[facility] Criteria for Skilled Therapy Interventions Met no problems identified which require skilled intervention  -        Functional Impact no impact on function  -           Recommendations    Therapy Frequency (SLP SLC) evaluation only  -        Anticipated Discharge Disposition (SLP) No further SLP services warranted  -           SLP Discharge Summary    Discharge Destination home  -        Progress Toward Achieving Short/long Term Goals discharge on same date as initial evaluation  -        Reason for Discharge all goals and outcomes met, no further needs identified  -                  User Key  (r) = Recorded By, (t) = Taken By, (c) = Cosigned By      Initials Name Effective Dates     Rosaline Mcnamara, MS CCC-SLP 06/16/21 -                        EDUCATION  The patient has been educated in the following areas:   Cognitive Impairment Communication Impairment.                    Time Calculation:    Time Calculation- SLP       Row Name 06/27/24 1116             Time Calculation- SLP    SLP Start Time 1050  -      SLP Received On 06/27/24  -         Untimed Charges    SLP Eval/Re-eval  ST Eval Speech and Production w/ Language - [id number]  -      [id number] Eval Speech and Production w/ Language Minutes 45   -CH         Total Minutes    Untimed Charges Total Minutes 45  -CH       Total Minutes 45  -CH                User Key  (r) = Recorded By, (t) = Taken By, (c) = Cosigned By      Initials Name Provider Type    Rosaline Dorman MS CCC-SLP Speech and Language Pathologist                    Therapy Charges for Today       Code Description Service Date Service Provider Modifiers Qty    [id number] HC ST EVAL SPEECH AND PROD W LANG  3 6/27/2024 Rosaline Mcnamara MS CCC-SLP GN 1                     SLP Discharge Summary  Anticipated Discharge Disposition (SLP): No further SLP services warranted  Reason for Discharge: all goals and outcomes met, no further needs identified  Progress Toward Achieving Short/long Term Goals: discharge on same date as initial evaluation  Discharge Destination: home    Rosaline Mcnamara MS CCC-SLP  6/27/2024

## 2024-06-27 NOTE — CONSULTS
Reason for consultation: Bilateral vertebral artery dissection    Referring Physician:  No ref. provider found     Chief complaint bilateral vertebral artery dissection with cerebral infarction    Admit Diagnosis:   Dissection, 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]     History of present illness:  This is a 43-year-old woman who underwent chiropractic manipulation.  She began to experience worsening pain and neurological deficit following this event.  She presented to [facility] emergency department where she underwent stroke workup which demonstrated bilateral vertebral artery dissections.  She was admitted to the intensive care unit and started on a heparin drip.    Her past medical history is significant for breast cancer.    ROS:  A 12 point review of systems was performed.  All systems reviewed were negative with the exception of those mentioned in the history of present illness.    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    Massey syndrome     Malignant neoplasm of lower-outer quadrant of left female breast 09/28/2016    Migraines 09/21/2016    Multiple gestation     I have a 13 and 15 year old    Osteoarthritis I think I have this    Pain dr I believe checked    Osteoporosis Pain everywhere    Pelvic pain 09/21/2016    Wears glasses     Well woman exam with routine gynecological exam 10/25/2017       Past surgical history:  Past Surgical History:   Procedure Laterality Date    BREAST BIOPSY   9/22/16    Result cancer    BREAST RECONSTRUCTION, BREAST TISSUE EXPANDER INSERTION Bilateral 11/01/2016    Procedure: BILATERAL BREAST IMMEDIATE RECONSTRUCTION, BREAST TISSUE EXPANDER INSERTION;  Surgeon: Kenny Massey MD;  Location:  [facility];  Service:     BREAST RECONSTRUCTION, BREAST TISSUE EXPANDER REMOVAL, IMPLANT INSERTION Left 06/09/2017    Procedure: PLACEMENT OF LEFT RECONSTRUCTED BREAST TISSUE EXPANDER ;  Surgeon: Kenny Massey MD;  Location:  [facility];  Service:     BREAST SURGERY      COLONOSCOPY  10/14/2016    DILATION AND CURETTAGE, DIAGNOSTIC / THERAPEUTIC      x2    HYSTERECTOMY      MASTECTOMY W/ SENTINEL NODE BIOPSY Bilateral 11/01/2016    Procedure: BILATERAL BREAST MASTECTOMY WITH LEFT SENTINEL NODE BIOPSY POSS AXILLARY NODE DISECTION;  Surgeon: Jose F Johnson MD;  Location:  [facility];  Service:     OOPHORECTOMY      TOTAL ABDOMINAL HYSTERECTOMY WITH SALPINGO OOPHORECTOMY  09/01/2011    Endometriosis    UPPER GASTROINTESTINAL ENDOSCOPY  11/19/2014    VENOUS ACCESS DEVICE (PORT) REMOVAL Right 03/17/2017    WISDOM TOOTH EXTRACTION  08/01/1999    3 teeth removed       Social history:  Social History     Socioeconomic History    Marital status:    Tobacco Use    Smoking status: Never     Passive exposure: Never    Smokeless tobacco: Never    Tobacco comments:     None ever   Vaping Use    Vaping status: Never Used   Substance and Sexual Activity    Alcohol use: Yes     Comment: Very seldom 3-4 times a year    Drug use: Never    Sexual activity: Yes     Partners: Male     Birth control/protection: Post-menopausal, None, Hysterectomy     Comment: Hurts / no hormones / we barely try anymore       Family history:  Family History   Problem Relation Age of Onset    Breast cancer Mother         Mom    Uterine cancer Mother         Mom    Migraines Mother         All the time from what I remember    Breast cancer Maternal Grandmother         Great grandmother    Colon cancer Maternal  Grandmother         Believe she went to [facility]    Alzheimer's disease Other     Cancer Other     Prostate cancer Paternal Grandfather         Went to [facility]       Allergies:  Allergies   Allergen Reactions    Penicillins GI Intolerance       Outpatient medications:  Scheduled Meds:atorvastatin, 40 mg, Oral, Nightly  pantoprazole, 40 mg, Intravenous, Q AM  senna-docusate sodium, 2 tablet, Oral, BID  sodium chloride, 10 mL, Intravenous, Q12H      Continuous Infusions:heparin, 8 Units/kg/hr, Last Rate: Stopped (24 0650)  niCARdipine, 5-15 mg/hr  Pharmacy to Dose Heparin,       PRN Meds:.  acetaminophen    senna-docusate sodium **AND** polyethylene glycol **AND** bisacodyl **AND** bisacodyl    Calcium Replacement - Follow Nurse / BPA Driven Protocol    Magnesium Standard Dose Replacement - Follow Nurse / BPA Driven Protocol    nitroglycerin    Pharmacy to Dose Heparin    Phosphorus Replacement - Follow Nurse / BPA Driven Protocol    Potassium Replacement - Follow Nurse / BPA Driven Protocol    sodium chloride    sodium chloride    Physical Examination:  General:  Vitals:    24 0700   BP: 121/97   Pulse: 66   Resp:    Temp:    SpO2: 95%     Systolic (24hrs), Av , Min:100 , Max:163     Diastolic (24hrs), Av, Min:73, Max:111    Pulse  Av.4  Min: 58  Max: 80    Temp (24hrs), Av.8 °F (36.6 °C), Min:97.6 °F (36.4 °C), Max:98 °F (36.7 °C)      She sitting in bed.  She is alert, pleasant, conversant, and appropriate.  Speech production is fluent with no dysarthria or dysphonia noted.  No pronator drift, and finger-nose testing is performed without bradykinesia or dysmetria.  No dysdiadochokinesia.  Rapid alternating movements are intact and symmetric.  Sensation is intact to light touch throughout.  Full motor strength proximally distally bilateral upper extremities and lower extremities.  No facial droop is noted, and no ophthalmoplegia is noted.    Imaging:  Available for my review is a CTA of  the head and neck which was performed yesterday.  There are bilateral vertebral artery dissections beginning at the V3 segment and extending into the V4 and basilar trunk.  There is opacification of the vessels of the posterior circulation, although the dissections do appear to be flow-limiting.  There is no evidence of pseudoaneurysm.    A MRI of the brain is also reviewed which demonstrates punctate strokes in the right cerebellar hemisphere.      Assessment and Plan:    Dissection, 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]     Recommend medical management at the present time with permissive hypertension for autoregulation although at the present time her systolic blood pressure is 114.    I think keeping her on a heparin drip for 24 hours makes sense and at which point we can probably transition her to dual antiplatelet therapy.  I would recommend aspirin and Brilinta.  Continue ICU level of care for now.  No surgical intervention is planned unless there is a clinical change.

## 2024-06-27 NOTE — PROGRESS NOTES
Stroke Neurology Progress Note     Subjective     This patient was seen in follow-up for: Multifocal dissections in bilateral vertebral arteries and basilar artery  Present for the encounter were: self, patient     Subjective:  My first encounter with the patient.  Patient admitted overnight.  Exam notable for new right eye blurred vision but visual fields are intact.  No cerebellar signs noted including no ataxia, dysmetria, no nystagmus, no difficulties with rapid alternating movements.    Objective      Temp:  [97.6 °F (36.4 °C)-98 °F (36.7 °C)] 97.6 °F (36.4 °C)  Heart Rate:  [58-80] 59  Resp:  [16-20] 20  BP: (100-163)/() 108/86            Objective    Physical Exam:  General Appearance: Alert  HEENT: anicteric sclera, no scleral injection  Lungs: respirations appear comfortable, no obvious increased work of breathing  Extremities: No cyanosis or fingernail clubbing   Skin: No rashes in exposed skin areas     Neurological Examination:   Mental status: Alert and oriented. No dysarthria. Able to name and repeat.  Cranial Nerves: Visual fields intact.  Patient reports objective right eye blurred vision.  Extraocular movements intact with no nystagmus. Midline gaze. Face symmetric.    Sensory: Normal sensory exam to light touch.  Motor: Normal tone. Absent pronator drift.  Strength:  LUE: 5/5 biceps, triceps,   LLE: 5/5 hip flexion/extension  RUE: 5/5 biceps, triceps,   RLE:  5/5 hip flexion/extension  Cerebellar: Finger-to-nose intact. Heel-to-shin intact. Rapid alternating movements are intact.   Gait: Not assessed.     Labs:    Lab Results   Component Value Date    HGBA1C 5.20 06/27/2024      Lab Results   Component Value Date    CHOL 186 06/27/2024    TRIG 282 (H) 06/27/2024    HDL 63 (H) 06/27/2024    LDL 78 06/27/2024       Lab Results   Component Value Date    WBC 8.61 06/27/2024    HGB 13.9 06/27/2024    HCT 40.1 06/27/2024    MCV 89.1 06/27/2024     06/27/2024     Lab Results  "  Component Value Date    GLUCOSE 97 06/27/2024    BUN 9 06/27/2024    CREATININE 0.66 06/27/2024    EGFRIFNONA 78 12/23/2019    BCR 13.6 06/27/2024    CO2 27.0 06/27/2024    CALCIUM 8.4 (L) 06/27/2024    ALBUMIN 3.6 06/27/2024    AST 15 06/27/2024    ALT 23 06/27/2024       Results from last 7 days   Lab Units 06/27/24  0520 06/26/24  1631   SODIUM mmol/L 136 139  138   POTASSIUM mmol/L 4.1 3.8   CHLORIDE mmol/L 101 100   CO2 mmol/L 27.0 27.0   BUN mg/dL 9 13   CREATININE mg/dL 0.66 0.88   CALCIUM mg/dL 8.4* 9.3   BILIRUBIN mg/dL 0.5 0.5   ALK PHOS U/L 74 90   ALT (SGPT) U/L 23 29   AST (SGOT) U/L 15 18   GLUCOSE mg/dL 97 120*       No results found for: \"BLOODCX\"  UA          11/8/2023    11:19 6/26/2024    17:01   Urinalysis   Squamous Epithelial Cells, UA 0-2  0-2    Specific Gravity, UA <=1.005  1.017    Ketones, UA Negative  Negative    Blood, UA Negative  Negative    Leukocytes, UA Small (1+)  Negative    Nitrite, UA Negative  Negative    RBC, UA 0-2  0-2    WBC, UA 3-5  0-2    Bacteria, UA None Seen  None Seen      Lab Results   Component Value Date    URINECX No growth 11/08/2023       Results Review:      All brain images and reports were personally reviewed and I agree with the interpretations except as noted below.      MRI Brain Without Contrast 6/26/2024  Impression: Multiple infarcts of the right cerebellar hemisphere predominantly lacunar.     CT Head Without Contrast Stroke Protocol 6/26/2024  Impression: No acute intracranial abnormality.     CT Angiogram Head and Neck 6/26/2024  Impression: Bilateral extracranial vertebral artery dissections as above. The basilar artery likewise appears dissected.           Assessment/Plan     Assessment:    # Bilateral vertebral artery dissections  # Basilar artery dissection  # Right cerebellar infarct    -Proceed with maximal medical management  -Continue heparin drip   -Continue atorvastatin 40 mg daily  -Serum sodium 136  -Repeat serum sodium daily   -Low " threshold to repeat MRI with change in neurologic status, possible despite MMM with multifocal dissections  -Low threshold to start osmotic therapies if concern for posterior fossa swelling (current stroke burden small per MRI 6/26)  -SBP <180 but please allow autoregulation as able  -Discussed with Neurosurgery, intervention could be considered if patient declines  -Guarded prognosis  -DVT prophylaxis SCDs  -Neurochecks per unit protocol   -Depending on hospital course will plan to transition to oral apixaban in 48 hours, anticipate duration at least 6 months      Stroke neurology will follow.  Please call with questions.     Akua Franklin MD  [facility] [place] [facility]  06/27/24  07:15 EDT

## 2024-06-27 NOTE — PLAN OF CARE
Goal Outcome Evaluation:                     Anticipated Discharge Disposition (SLP): No further SLP services warranted    SLP Diagnosis: functional speech/language skills, functional cognitive-linguistic skills (06/27/24 1050)

## 2024-06-27 NOTE — PLAN OF CARE
Goal Outcome Evaluation:  Plan of Care Reviewed With: patient        Progress: improving  Outcome Evaluation: OT eval complete. Pt presents w/ mild balance deficits and c/o R visual field blurriness though responded to prompts appropriately. Pt educated on compensatory stratgies and home safety techniques d/t c/o worsening dizziness/blurry vision with positional changes. Recommend cont skilled IPOT POC 1-2 more session to ensure carry-over of education. Recommend pt DC home w/ assist.      Anticipated Discharge Disposition (OT): home with assist

## 2024-06-28 LAB
ANION GAP SERPL CALCULATED.3IONS-SCNC: 11 MMOL/L (ref 5–15)
BUN SERPL-MCNC: 9 MG/DL (ref 6–20)
BUN/CREAT SERPL: 10.3 (ref 7–25)
CALCIUM SPEC-SCNC: 9 MG/DL (ref 8.6–10.5)
CHLORIDE SERPL-SCNC: 99 MMOL/L (ref 98–107)
CO2 SERPL-SCNC: 27 MMOL/L (ref 22–29)
CREAT SERPL-MCNC: 0.87 MG/DL (ref 0.57–1)
DEPRECATED RDW RBC AUTO: 42.4 FL (ref 37–54)
EGFRCR SERPLBLD CKD-EPI 2021: 84.9 ML/MIN/1.73
ERYTHROCYTE [DISTWIDTH] IN BLOOD BY AUTOMATED COUNT: 12.4 % (ref 12.3–15.4)
GLUCOSE BLDC GLUCOMTR-MCNC: 149 MG/DL (ref 70–130)
GLUCOSE BLDC GLUCOMTR-MCNC: 81 MG/DL (ref 70–130)
GLUCOSE BLDC GLUCOMTR-MCNC: 88 MG/DL (ref 70–130)
GLUCOSE BLDC GLUCOMTR-MCNC: 94 MG/DL (ref 70–130)
GLUCOSE SERPL-MCNC: 101 MG/DL (ref 65–99)
HCT VFR BLD AUTO: 47.1 % (ref 34–46.6)
HGB BLD-MCNC: 15.7 G/DL (ref 12–15.9)
MAGNESIUM SERPL-MCNC: 2.5 MG/DL (ref 1.6–2.6)
MCH RBC QN AUTO: 31.4 PG (ref 26.6–33)
MCHC RBC AUTO-ENTMCNC: 33.3 G/DL (ref 31.5–35.7)
MCV RBC AUTO: 94.2 FL (ref 79–97)
PHOSPHATE SERPL-MCNC: 3.9 MG/DL (ref 2.5–4.5)
PLATELET # BLD AUTO: 213 10*3/MM3 (ref 140–450)
PMV BLD AUTO: 11.4 FL (ref 6–12)
POTASSIUM SERPL-SCNC: 4.1 MMOL/L (ref 3.5–5.2)
RBC # BLD AUTO: 5 10*6/MM3 (ref 3.77–5.28)
SODIUM SERPL-SCNC: 135 MMOL/L (ref 136–145)
SODIUM SERPL-SCNC: 137 MMOL/L (ref 136–145)
SODIUM SERPL-SCNC: 138 MMOL/L (ref 136–145)
UFH PPP CHRO-ACNC: 0.36 IU/ML (ref 0.3–0.7)
UFH PPP CHRO-ACNC: 0.36 IU/ML (ref 0.3–0.7)
VALPROATE SERPL-MCNC: 59.9 MCG/ML (ref 50–125)
WBC NRBC COR # BLD AUTO: 6.68 10*3/MM3 (ref 3.4–10.8)

## 2024-06-28 PROCEDURE — 82948 REAGENT STRIP/BLOOD GLUCOSE: CPT

## 2024-06-28 PROCEDURE — 84295 ASSAY OF SERUM SODIUM: CPT | Performed by: NURSE PRACTITIONER

## 2024-06-28 PROCEDURE — 99233 SBSQ HOSP IP/OBS HIGH 50: CPT | Performed by: STUDENT IN AN ORGANIZED HEALTH CARE EDUCATION/TRAINING PROGRAM

## 2024-06-28 PROCEDURE — 80048 BASIC METABOLIC PNL TOTAL CA: CPT | Performed by: INTERNAL MEDICINE

## 2024-06-28 PROCEDURE — 85520 HEPARIN ASSAY: CPT

## 2024-06-28 PROCEDURE — 85027 COMPLETE CBC AUTOMATED: CPT | Performed by: INTERNAL MEDICINE

## 2024-06-28 PROCEDURE — 63710000001 PREDNISONE PER 1 MG: Performed by: STUDENT IN AN ORGANIZED HEALTH CARE EDUCATION/TRAINING PROGRAM

## 2024-06-28 PROCEDURE — 25010000002 MAGNESIUM SULFATE 2 GM/50ML SOLUTION: Performed by: STUDENT IN AN ORGANIZED HEALTH CARE EDUCATION/TRAINING PROGRAM

## 2024-06-28 PROCEDURE — 25010000002 DIPHENHYDRAMINE PER 50 MG: Performed by: STUDENT IN AN ORGANIZED HEALTH CARE EDUCATION/TRAINING PROGRAM

## 2024-06-28 PROCEDURE — 25010000002 HEPARIN (PORCINE) 25000-0.45 UT/250ML-% SOLUTION

## 2024-06-28 PROCEDURE — 84100 ASSAY OF PHOSPHORUS: CPT | Performed by: INTERNAL MEDICINE

## 2024-06-28 PROCEDURE — 83735 ASSAY OF MAGNESIUM: CPT | Performed by: INTERNAL MEDICINE

## 2024-06-28 PROCEDURE — 99232 SBSQ HOSP IP/OBS MODERATE 35: CPT | Performed by: INTERNAL MEDICINE

## 2024-06-28 PROCEDURE — 25010000002 PROCHLORPERAZINE 10 MG/2ML SOLUTION

## 2024-06-28 PROCEDURE — 25010000002 PROCHLORPERAZINE 10 MG/2ML SOLUTION: Performed by: STUDENT IN AN ORGANIZED HEALTH CARE EDUCATION/TRAINING PROGRAM

## 2024-06-28 PROCEDURE — 80164 ASSAY DIPROPYLACETIC ACD TOT: CPT | Performed by: NURSE PRACTITIONER

## 2024-06-28 RX ORDER — DIPHENHYDRAMINE HYDROCHLORIDE 50 MG/ML
12.5 INJECTION INTRAMUSCULAR; INTRAVENOUS EVERY 6 HOURS
Qty: 12 ML | Refills: 0 | Status: DISCONTINUED | OUTPATIENT
Start: 2024-06-28 | End: 2024-06-30 | Stop reason: HOSPADM

## 2024-06-28 RX ORDER — METOCLOPRAMIDE HYDROCHLORIDE 5 MG/ML
10 INJECTION INTRAMUSCULAR; INTRAVENOUS ONCE AS NEEDED
Status: ACTIVE | OUTPATIENT
Start: 2024-06-28 | End: 2024-06-28

## 2024-06-28 RX ORDER — GABAPENTIN 300 MG/1
CAPSULE ORAL
Status: COMPLETED
Start: 2024-06-28 | End: 2024-06-28

## 2024-06-28 RX ORDER — PROCHLORPERAZINE EDISYLATE 5 MG/ML
10 INJECTION INTRAMUSCULAR; INTRAVENOUS ONCE
Status: COMPLETED | OUTPATIENT
Start: 2024-06-28 | End: 2024-06-28

## 2024-06-28 RX ORDER — PREDNISONE 20 MG/1
20 TABLET ORAL DAILY
Qty: 3 TABLET | Refills: 0 | Status: COMPLETED | OUTPATIENT
Start: 2024-06-28 | End: 2024-06-30

## 2024-06-28 RX ORDER — PROCHLORPERAZINE EDISYLATE 5 MG/ML
INJECTION INTRAMUSCULAR; INTRAVENOUS
Status: COMPLETED
Start: 2024-06-28 | End: 2024-06-28

## 2024-06-28 RX ORDER — MAGNESIUM SULFATE HEPTAHYDRATE 40 MG/ML
2 INJECTION, SOLUTION INTRAVENOUS EVERY 6 HOURS
Qty: 600 ML | Refills: 0 | Status: DISCONTINUED | OUTPATIENT
Start: 2024-06-28 | End: 2024-06-30 | Stop reason: HOSPADM

## 2024-06-28 RX ORDER — PROCHLORPERAZINE EDISYLATE 5 MG/ML
10 INJECTION INTRAMUSCULAR; INTRAVENOUS EVERY 6 HOURS
Qty: 24 ML | Refills: 0 | Status: DISCONTINUED | OUTPATIENT
Start: 2024-06-28 | End: 2024-06-30 | Stop reason: HOSPADM

## 2024-06-28 RX ORDER — ACETAMINOPHEN 500 MG
1000 TABLET ORAL EVERY 6 HOURS PRN
Status: DISCONTINUED | OUTPATIENT
Start: 2024-06-28 | End: 2024-06-30 | Stop reason: HOSPADM

## 2024-06-28 RX ORDER — GABAPENTIN 300 MG/1
300 CAPSULE ORAL 3 TIMES DAILY
Status: DISCONTINUED | OUTPATIENT
Start: 2024-06-28 | End: 2024-06-30 | Stop reason: HOSPADM

## 2024-06-28 RX ADMIN — MAGNESIUM SULFATE HEPTAHYDRATE 2 G: 2 INJECTION, SOLUTION INTRAVENOUS at 22:37

## 2024-06-28 RX ADMIN — GABAPENTIN 300 MG: 300 CAPSULE ORAL at 17:08

## 2024-06-28 RX ADMIN — MAGNESIUM SULFATE HEPTAHYDRATE 2 G: 2 INJECTION, SOLUTION INTRAVENOUS at 17:00

## 2024-06-28 RX ADMIN — PROCHLORPERAZINE EDISYLATE 10 MG: 5 INJECTION INTRAMUSCULAR; INTRAVENOUS at 03:05

## 2024-06-28 RX ADMIN — ATORVASTATIN CALCIUM 40 MG: 40 TABLET, FILM COATED ORAL at 20:03

## 2024-06-28 RX ADMIN — GABAPENTIN 300 MG: 300 CAPSULE ORAL at 03:05

## 2024-06-28 RX ADMIN — PROCHLORPERAZINE EDISYLATE 10 MG: 5 INJECTION INTRAMUSCULAR; INTRAVENOUS at 22:37

## 2024-06-28 RX ADMIN — PROCHLORPERAZINE EDISYLATE 10 MG: 5 INJECTION INTRAMUSCULAR; INTRAVENOUS at 16:59

## 2024-06-28 RX ADMIN — GABAPENTIN 300 MG: 300 CAPSULE ORAL at 08:48

## 2024-06-28 RX ADMIN — SENNOSIDES AND DOCUSATE SODIUM 2 TABLET: 50; 8.6 TABLET ORAL at 08:48

## 2024-06-28 RX ADMIN — HEPARIN SODIUM 10 UNITS/KG/HR: 10000 INJECTION, SOLUTION INTRAVENOUS at 01:00

## 2024-06-28 RX ADMIN — SENNOSIDES AND DOCUSATE SODIUM 2 TABLET: 50; 8.6 TABLET ORAL at 20:03

## 2024-06-28 RX ADMIN — ACETAMINOPHEN 1000 MG: 500 TABLET, FILM COATED ORAL at 08:52

## 2024-06-28 RX ADMIN — GABAPENTIN 300 MG: 300 CAPSULE ORAL at 20:03

## 2024-06-28 RX ADMIN — ACETAMINOPHEN 1000 MG: 500 TABLET, FILM COATED ORAL at 00:04

## 2024-06-28 RX ADMIN — Medication 10 ML: at 20:06

## 2024-06-28 RX ADMIN — PREDNISONE 20 MG: 20 TABLET ORAL at 17:00

## 2024-06-28 RX ADMIN — PANTOPRAZOLE SODIUM 40 MG: 40 INJECTION, POWDER, FOR SOLUTION INTRAVENOUS at 06:25

## 2024-06-28 RX ADMIN — DIPHENHYDRAMINE HYDROCHLORIDE 12.5 MG: 50 INJECTION INTRAMUSCULAR; INTRAVENOUS at 16:59

## 2024-06-28 RX ADMIN — DIPHENHYDRAMINE HYDROCHLORIDE 12.5 MG: 50 INJECTION INTRAMUSCULAR; INTRAVENOUS at 22:37

## 2024-06-28 RX ADMIN — VALPROATE SODIUM 800 MG: 100 INJECTION, SOLUTION INTRAVENOUS at 18:16

## 2024-06-28 RX ADMIN — ACETAMINOPHEN 1000 MG: 500 TABLET, FILM COATED ORAL at 13:36

## 2024-06-28 NOTE — PROGRESS NOTES
Stroke Neurology Progress Note     Subjective     This patient was seen in follow-up for: Multifocal dissections in bilateral vertebral arteries and basilar artery  Present for the encounter were: self, patient, patient's two sisters.     Subjective:  Right vision deficit has resolved. Continues to report 8/10 headache. She does have baseline headaches, previously treated with Botox injections. Her next injection is July 19 per her report. She has taken Imitrex, as well on the day of her stroke, we discussed discontinue for stroke risk prevention.  No cerebellar signs noted including no ataxia, dysmetria, no nystagmus, no difficulties with rapid alternating movements.    Objective      Temp:  [97.6 °F (36.4 °C)-98.3 °F (36.8 °C)] 98.3 °F (36.8 °C)  Heart Rate:  [64-98] 87  Resp:  [17-20] 17  BP: (112-147)/() 124/90            Objective    Physical Exam:  General Appearance: Alert  HEENT: anicteric sclera, no scleral injection  Lungs: respirations appear comfortable, no obvious increased work of breathing  Extremities: No cyanosis or fingernail clubbing   Skin: No rashes in exposed skin areas     Neurological Examination:   Mental status: Alert and oriented. No dysarthria. Able to name and repeat.  Cranial Nerves: Visual fields intact. Extraocular movements intact with no nystagmus. Midline gaze. Face symmetric.    Sensory: Normal sensory exam to light touch.  Motor: Normal tone. Absent pronator drift.  Strength:  LUE: 5/5 biceps, triceps,   LLE: 5/5 hip flexion/extension  RUE: 5/5 biceps, triceps,   RLE:  5/5 hip flexion/extension  Cerebellar: Finger-to-nose intact. Heel-to-shin intact. Rapid alternating movements are intact.   Gait: Not assessed.     Labs:    Lab Results   Component Value Date    HGBA1C 5.20 06/27/2024      Lab Results   Component Value Date    CHOL 186 06/27/2024    TRIG 282 (H) 06/27/2024    HDL 63 (H) 06/27/2024    LDL 78 06/27/2024       Lab Results   Component Value Date    WBC  "6.68 06/28/2024    HGB 15.7 06/28/2024    HCT 47.1 (H) 06/28/2024    MCV 94.2 06/28/2024     06/28/2024     Lab Results   Component Value Date    GLUCOSE 101 (H) 06/28/2024    BUN 9 06/28/2024    CREATININE 0.87 06/28/2024    EGFRIFNONA 78 12/23/2019    BCR 10.3 06/28/2024    CO2 27.0 06/28/2024    CALCIUM 9.0 06/28/2024    ALBUMIN 3.6 06/27/2024    AST 15 06/27/2024    ALT 23 06/27/2024       Results from last 7 days   Lab Units 06/28/24  0753 06/28/24  0343 06/27/24 2023 06/27/24  0520 06/26/24  1631   SODIUM mmol/L 138 137 135* 136 139  138   POTASSIUM mmol/L  --  4.1  --  4.1 3.8   CHLORIDE mmol/L  --  99  --  101 100   CO2 mmol/L  --  27.0  --  27.0 27.0   BUN mg/dL  --  9  --  9 13   CREATININE mg/dL  --  0.87  --  0.66 0.88   CALCIUM mg/dL  --  9.0  --  8.4* 9.3   BILIRUBIN mg/dL  --   --   --  0.5 0.5   ALK PHOS U/L  --   --   --  74 90   ALT (SGPT) U/L  --   --   --  23 29   AST (SGOT) U/L  --   --   --  15 18   GLUCOSE mg/dL  --  101*  --  97 120*       No results found for: \"BLOODCX\"  UA          11/8/2023    11:19 6/26/2024    17:01   Urinalysis   Squamous Epithelial Cells, UA 0-2  0-2    Specific Gravity, UA <=1.005  1.017    Ketones, UA Negative  Negative    Blood, UA Negative  Negative    Leukocytes, UA Small (1+)  Negative    Nitrite, UA Negative  Negative    RBC, UA 0-2  0-2    WBC, UA 3-5  0-2    Bacteria, UA None Seen  None Seen      Lab Results   Component Value Date    URINECX No growth 11/08/2023       Results Review:      All brain images and reports were personally reviewed and I agree with the interpretations except as noted below.      MRI Brain Without Contrast 6/26/2024  Impression: Multiple infarcts of the right cerebellar hemisphere predominantly lacunar.     CT Head Without Contrast Stroke Protocol 6/26/2024  Impression: No acute intracranial abnormality.     CT Angiogram Head and Neck 6/26/2024  Impression: Bilateral extracranial vertebral artery dissections as above. The " basilar artery likewise appears dissected.           Assessment/Plan     Assessment:    # Bilateral vertebral artery dissections  # Basilar artery dissection  # Right cerebellar infarct    -Continue maximal medical management  -Continue heparin drip   -Anticipate transition to apixaban 5 mg BID on 6/29/2024; recommend monitor for 24 more hours thereafter. Anticipate duration at least 6 months to 1 year with at least antiplatelet monotherapy lifelong thereafter.    -Continue atorvastatin 40 mg daily  -Serum sodium 138  -Repeat serum sodium daily   -Low threshold to repeat MRI with change in neurologic status, recurrent stroke is possible despite MMM particularly in the setting of multiple posterior circulation dissections.   -Low threshold to start osmotic therapies if concern for posterior fossa swelling (unlikely, current stroke burden small per MRI 6/26)  -SBP <180 but please allow autoregulation as able  -Discussed with Neurosurgery, intervention could be considered if patient declines  -DVT prophylaxis SCD  -Neurochecks per unit protocol   -Advised dissection precautions including avoidance of cervical manipulation, cervical trauma, sports injuries, or whiplash movements    # Acute on chronic headaches    -I advised that headaches will be expected over the next few months while dissection heals  -Inpatient treatment: Valproic acid 800 mg IV daily for 3 days, Benadryl 12.5 mg every 6 hours for 3 days, prochlorperazine 10 mg IV every 6 hours for 3 days, magnesium sulfate 2 g IV every 6 hours for 3 days, prednisone 20 mg daily for 3 days, IV fluids.  Tylenol 1000 mg every 8 hours as needed.  -If above regimen ineffective, can consider one-time dose opioid medication  -Avoid NSAIDs to reduce bleed risk  -Patient follows in headache clinic  -Patient due for Botox July 19  -Recommend outpatient nerve block next available      Stroke neurology will follow.  Please call with questions.     Akua Franklin MD  [facility] [place]  [facility]UR[facility]  06/28/24  14:51 EDT

## 2024-06-28 NOTE — PROGRESS NOTES
INTENSIVIST   PROGRESS NOTE     Hospital:  LOS: 2 days     Ms. Edna Motley, 43 y.o. female is followed for a Chief Complaint of: CVA      Subjective   S     Interval History:  No acute events. Continues to have a headache.        The patient's relevant past medical, surgical and social history were reviewed and updated in Epic as appropriate.      ROS:   Constitutional: Negative for fever.   Respiratory: Negative for dyspnea.   Cardiovascular: Negative for chest pain.   Gastrointestinal: Negative for  nausea, vomiting and diarrhea.     Objective   O     Vitals:  Temp  Min: 97.6 °F (36.4 °C)  Max: 98.3 °F (36.8 °C)  BP  Min: 112/84  Max: 147/110  Pulse  Min: 65  Max: 98  Resp  Min: 17  Max: 20  SpO2  Min: 92 %  Max: 100 % No data recorded    Intake/Ouptut 24 hrs (7:00AM - 6:59 AM)  Intake & Output (last 3 days)         06/24 0701  06/25 0700 06/25 0701  06/26 0700 06/26 0701  06/27 0700 06/27 0701  06/28 0700    P.O.    354    I.V. (mL/kg)   159.6 (1.8)     Total Intake(mL/kg)   159.6 (1.8) 354 (3.9)    Urine (mL/kg/hr)    1000 (1.2)    Total Output    1000    Net   +159.6 -646            Urine Unmeasured Occurrence   2 x             Medications (drips):  heparin, Last Rate: 10 Units/kg/hr (06/28/24 0100)  niCARdipine  Pharmacy to Dose Heparin          Physical Examination  Telemetry:  Normal sinus rhythm.    Constitutional:  No acute distress.  Resting in bed with the lights off.    Eyes: No scleral icterus.   PERRL, EOM intact.    Neck:  Supple, FROM   Cardiovascular: Normal rate, regular and rhythm. Normal heart sounds.  No murmurs, gallop or rub.   Respiratory: No respiratory distress. Normal respiratory effort.  Normal breath sounds  Clear to auscultation   Abdominal:  Soft. No masses. Nontender. No distension. No HSM.   Extremities: No digital cyanosis. No clubbing.  No peripheral edema.   Skin: No rashes, lesions or ulcers   Neurological:   Arouses to stimulation and is interactive.              Results  from last 7 days   Lab Units 06/28/24  0439 06/27/24  0520 06/26/24  1631   WBC 10*3/mm3 6.68 8.61 10.63   HEMOGLOBIN g/dL 15.7 13.9 15.9   MCV fL 94.2 89.1 90.8   PLATELETS 10*3/mm3 213 227 254     Results from last 7 days   Lab Units 06/28/24  0753 06/28/24  0343 06/27/24 2023 06/27/24  0520 06/26/24  1631   SODIUM mmol/L 138 137 135* 136 139  138   POTASSIUM mmol/L  --  4.1  --  4.1 3.8   CO2 mmol/L  --  27.0  --  27.0 27.0   CREATININE mg/dL  --  0.87  --  0.66 0.88   GLUCOSE mg/dL  --  101*  --  97 120*   MAGNESIUM mg/dL  --  2.5  --  2.6 2.6   PHOSPHORUS mg/dL  --  3.9  --  4.1  --      Estimated Creatinine Clearance: 94.6 mL/min (by C-G formula based on SCr of 0.87 mg/dL).  Results from last 7 days   Lab Units 06/27/24  0520 06/26/24  1631   ALK PHOS U/L 74 90   BILIRUBIN mg/dL 0.5 0.5   ALT (SGPT) U/L 23 29   AST (SGOT) U/L 15 18             Images:  Imaging Results (Last 24 Hours)       Procedure Component Value Units Date/Time    CT Head Without Contrast [[id number]] Collected: 06/27/24 1743     Updated: 06/27/24 1749    Narrative:      CT HEAD WO CONTRAST    Date of Exam: 6/27/2024 5:23 PM EDT    Indication: severe headache, on hep gtt for dissection treatment.    Comparison: 6/27/2024 and 6/26/2024    Technique: Axial CT images were obtained of the head without contrast administration.  Automated exposure control and iterative construction methods were used.    Findings: Gray-white matter differentiation is maintained without evidence of an acute infarction. No intracranial mass or mass effect. No extra-axial mass or collection. The ventricles and sulci are normal in size and configuration. Stable small right   cerebellar infarct. Sellar and suprasellar structures are normal.    Orbital and paranasal soft tissues are normal. The paranasal sinuses, ethmoid air cells, and mastoid air cells are aerated. The bony calvarium appears intact. No acute fractures. No lytic or blastic bony diseases.       Impression:      Impression: Stable appearance of the small right cerebellar infarct. No new changes.            Electronically Signed: Otoniel Pinzon MD    6/27/2024 5:45 PM EDT    Workstation ID: [id number]               Results: Reviewed.  I reviewed the patient's new laboratory and imaging results.  I independently reviewed the patient's new images.    Medications: Reviewed.    Assessment & Plan   A / P     Ms. Motley is a 44yo F with a history of Massey Syndrome, breast cancer and chronic migraines who presented to the [facility] ED on 6/26 via EMS from her chiropractor's office after developing acute onset dizziness, nausea and vomiting. 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 She was seen by Neurology and Neurosurgery who recommended blood pressure control and a heparin infusion.     Nutrition:   Diet: Regular/House; Fluid Consistency: Thin (IDDSI 0)  Advance Directives:   Code Status and Medical Interventions:   Ordered at: 06/27/24 0036     Code Status (Patient has no pulse and is not breathing):    CPR (Attempt to Resuscitate)     Medical Interventions (Patient has pulse or is breathing):    Full Support       Active Hospital Problems    Diagnosis     **Dissection, vertebral artery     Migraines        Assessment / Plan:    Continue ICU care.   Continue heparin drip.  Transition when okay with Neurology.   Neurology and Neurosurgery following.   Monitor sodium levels and avoid hyponatremia.   Repeat MRI for change in neurologic status.   Blood pressure control for SBP < 180.   AM labs    High level of risk due to:  severe exacerbation of chronic illness and illness with threat to life or bodily function.    Plan of care and goals reviewed during interdisciplinary rounds.  I discussed the patient's findings and my recommendations with patient and nursing staff      Rashmi Schultz DO    Intensive Care Medicine and Pulmonary Medicine

## 2024-06-28 NOTE — PROGRESS NOTES
HEPARIN INFUSION  Edna Motley is a  43 y.o. female receiving heparin infusion.     Therapy for (VTE/Cardiac): Cardiac   Patient Weight: 90.7 kg  Initial Bolus (Y/N): No  Any Bolus (Y/N): No bolus ever      Signs or Symptoms of Bleeding: No S/Sx overt bleeding noted - d/w RN at bedside.    Cardiac or Other (Not VTE)  Initial rate: 12 units/kg/hr (Max 1,000 units/hr)   Anti Xa Rebolus Infusion Hold time Change infusion Dose (Units/kg/hr) Next Anti Xa or aPTT Level Due   < 0.11 None Increase by  3 Units/kg/hr 6 hours   0.11- 0.19 None Increase by  2 Units/kg/hr 6 hours   0.2 - 0.29 0 None Increase by  1 Units/kg/hr 6 hours   0.3 - 0.5 0 None No Change 6 hours (after 2 consecutive levels in range check qAM)   0.51 - 0.6 0 None Decrease by  1 Units/kg/hr 6 hours   0.61 - 0.8 0 30 Minutes Decrease by  2 Units/kg/hr 6 hours   0.81 - 1 0 60 Minutes Decrease by  3 Units/kg/hr 6 hours   >1 0 Hold  After Anti Xa less than 0.5 decrease previous rate by  4 Units/kg/hr  Every 2 hours until Anti Xa  less than 0.5 then when infusion restarts in 6 hours     Results from last 7 days   Lab Units 06/28/24  0439 06/27/24  0520 06/26/24  1631   INR   --   --  0.96   HEMOGLOBIN g/dL 15.7 13.9 15.9   HEMATOCRIT % 47.1* 40.1 46.5   PLATELETS 10*3/mm3 213 227 254        Date   Time   Anti-Xa Current Rate (Unit/kg/hr) Bolus   (Units) Rate Change   (Unit/kg/hr) New Rate (Unit/kg/hr) Next   Anti-Xa Comments  Pump Check Daily   6/26 1756 0.1 -- No bolus +11 11 0000 D/w RN   6/27 0030 0.41 11 -- -- 11 0600 D/w RN   6/27 0630 0.96 11 -- -3 8 1200 D/w RN, hold 1 hour   6/27 1209 0.21 8 -- +1 9 1900 D/w BLADIMIR Alvarez;   rate verified   6/27 1930 0.27 9 -- +1 10 0200 DW RN   6/28 0330 0.36 10 -- -- 10 0800 Drawn during downtime at ~02:00  Priscilla [id number] [facility]   6/28 0753 0.36 10 -- -- 10 0600 D/w RN   6/28 1400 PUMP CHECK 10 -- -- 10 0600 Verified weight and rate in the pump       --           --           --           --           --           --            --           --           --           --           --           --           --       Thank you,    Deborah King, PharmD, BCPS  6/28/2024  14:22 EDT

## 2024-06-28 NOTE — CASE MANAGEMENT/SOCIAL WORK
Continued Stay Note   Bates     Patient Name: Edna Motley  MRN: [id number]  Today's Date: 6/28/2024    Admit Date: 6/26/2024    Plan: Home   Discharge Plan       Row Name 06/28/24 1457       Plan    Plan Home    Patient/Family in Agreement with Plan yes    Plan Comments Discussed patient in MDR.  Anticipate patient to discharge home over the weekend and family will transport.  CM will continue to follow.                   Discharge Codes    No documentation.                       Alise Winchester RN

## 2024-06-28 NOTE — PLAN OF CARE
Goal Outcome Evaluation:         Pt had a good day. Received pt about 1530. Pt is AXO. NUNN. Up to bathroom with standby assist. Adequate urine output. Pt did have complaint of headache. Pt was given medication for that, that helped. Family at bedside.

## 2024-06-28 NOTE — SIGNIFICANT NOTE
Primary RN called because patient continued to have headache despite being on around-the-clock Tylenol received migraine cocktail earlier rated 8/10 without any visual deficits, no nausea or vomiting, NIH unchanged, unchanged neurological status with intermittent drowsiness drowsiness and sleepiness easy to arouse.  Ordered one-time Depakote 800 mg IV for headache to reassess symptoms.  Unfortunately headache continue 8/10 with no changes in NIH or neurological status.  Spoke to the patient over the phone at that time who reported that the migraine cocktail knocked her down however she woke up with a throbbing headache pain with no visual deficits.  Patient is not comfortable because of the headache unable to rest.  At this time ordered gabapentin 300 mg p.o. 3 times daily, one-time Compazine 10 mg IV if no relief within 30 minutes 1 time Reglan 10 mg IV and reassess headache.  Patient was seen later and reassessed her headache reports that headache much better at this time, patient is drowsy but easy to arouse, alert and oriented x 4, follow commands moving all extremities no visual deficit per my exam.  Interval:  (Return from CT)  1a. Level of Consciousness: 1-->Not alert, but arousable by minor stimulation to obey, answer, or respond  1b. LOC Questions: 0-->Answers both questions correctly  1c. LOC Commands: 0-->Performs both tasks correctly  2. Best Gaze: 0-->Normal  3. Visual: 0-->No visual loss  4. Facial Palsy: 0-->Normal symmetrical movements  5a. Motor Arm, Left: 0-->No drift, limb holds 90 (or 45) degrees for full 10 secs  5b. Motor Arm, Right: 0-->No drift, limb holds 90 (or 45) degrees for full 10 secs  6a. Motor Leg, Left: 0-->No drift, leg holds 30 degree position for full 5 secs  6b. Motor Leg, Right: 0-->No drift, leg holds 30 degree position for full 5 secs  7. Limb Ataxia: 0-->Absent  8. Sensory: 0-->Normal, no sensory loss  9. Best Language: 0-->No aphasia, normal  10. Dysarthria: 0-->Normal  11.  Extinction and Inattention (formerly Neglect): 0-->No abnormality    Total (NIH Stroke Scale): 1         CT Head Without Contrast    Result Date: 6/27/2024  Impression: Stable appearance of the small right cerebellar infarct. No new changes. Electronically Signed: Otoniel Pinzon MD  6/27/2024 5:45 PM EDT  Workstation ID: [id number]    CT Head Without Contrast    Result Date: 6/27/2024  Impression: Subacute nonhemorrhagic right cerebellar hemisphere CVA. Other smaller CVAs seen by MR imaging are not evident on the CT brain exam. Electronically Signed: Pati Alicea MD  6/27/2024 7:55 AM EDT  Workstation ID: [id number]    MRI Brain Without Contrast    Result Date: 6/26/2024  Multiple infarcts of the right cerebellar hemisphere predominantly lacunar. Electronically Signed: Otoniel Pinzon MD  6/26/2024 8:06 PM EDT  Workstation ID: [id number]    CT Head Without Contrast Stroke Protocol    Result Date: 6/26/2024  Impression: No acute intracranial abnormality. 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 Please refer to the CTA of the head and neck report from earlier today. Electronically Signed: James Montez DO  6/26/2024 6:15 PM EDT  Workstation ID: [id number]    CT Angiogram Head    Result Date: 6/26/2024  Bilateral extracranial vertebral artery dissections as above. The basilar artery likewise appears dissected. Findings discussed with Dr. Hunt at 5:22 p.m. on 6/26/2024 Electronically Signed: Otoniel Pinzon MD  6/26/2024 5:25 PM EDT  Workstation ID: [id number]    CT Angiogram Neck    Result Date: 6/26/2024  Bilateral extracranial vertebral artery dissections as above. The basilar artery likewise appears dissected. Findings discussed with Dr. Hunt at 5:22 p.m. on 6/26/2024 Electronically Signed: Otoniel Pinzon MD  6/26/2024 5:25 PM EDT  Workstation ID: [id number]    XR Chest 1 View    Result Date: 6/26/2024  Impression: No  evidence of active chest disease. Electronically Signed: Jason Nguyen MD  6/26/2024 4:38 PM EDT  Workstation ID: [id number]      Assessment and plan  Higher dose gabapentin and antiemetic seems to be more effective to control patient's symptoms, will continue to reassess if any changes with NIH or neurological status will repeat stat CT head.  Also suggested multimodal nonpharmacological pain control ice pack or cold compress to the head as needed, continue manuelito lights, and providing calm environment and bumble of care to ensure patient's rest. continue heparin drip for now, patient's blood pressure is within control without Cardene drip.  Neurology stroke will continue to follow-up.

## 2024-06-29 LAB
ANION GAP SERPL CALCULATED.3IONS-SCNC: 16 MMOL/L (ref 5–15)
BUN SERPL-MCNC: 8 MG/DL (ref 6–20)
BUN/CREAT SERPL: 10.5 (ref 7–25)
CALCIUM SPEC-SCNC: 8.9 MG/DL (ref 8.6–10.5)
CHLORIDE SERPL-SCNC: 98 MMOL/L (ref 98–107)
CO2 SERPL-SCNC: 21 MMOL/L (ref 22–29)
CREAT SERPL-MCNC: 0.76 MG/DL (ref 0.57–1)
DEPRECATED RDW RBC AUTO: 41.7 FL (ref 37–54)
EGFRCR SERPLBLD CKD-EPI 2021: 99.9 ML/MIN/1.73
ERYTHROCYTE [DISTWIDTH] IN BLOOD BY AUTOMATED COUNT: 12.3 % (ref 12.3–15.4)
GLUCOSE BLDC GLUCOMTR-MCNC: 112 MG/DL (ref 70–130)
GLUCOSE BLDC GLUCOMTR-MCNC: 139 MG/DL (ref 70–130)
GLUCOSE BLDC GLUCOMTR-MCNC: 154 MG/DL (ref 70–130)
GLUCOSE SERPL-MCNC: 111 MG/DL (ref 65–99)
HCT VFR BLD AUTO: 46.8 % (ref 34–46.6)
HGB BLD-MCNC: 15.8 G/DL (ref 12–15.9)
MAGNESIUM SERPL-MCNC: 3.1 MG/DL (ref 1.6–2.6)
MCH RBC QN AUTO: 31.5 PG (ref 26.6–33)
MCHC RBC AUTO-ENTMCNC: 33.8 G/DL (ref 31.5–35.7)
MCV RBC AUTO: 93.2 FL (ref 79–97)
PHOSPHATE SERPL-MCNC: 3.9 MG/DL (ref 2.5–4.5)
PLATELET # BLD AUTO: 241 10*3/MM3 (ref 140–450)
PMV BLD AUTO: 10.8 FL (ref 6–12)
POTASSIUM SERPL-SCNC: 4.6 MMOL/L (ref 3.5–5.2)
RBC # BLD AUTO: 5.02 10*6/MM3 (ref 3.77–5.28)
SODIUM SERPL-SCNC: 135 MMOL/L (ref 136–145)
SODIUM SERPL-SCNC: 138 MMOL/L (ref 136–145)
UFH PPP CHRO-ACNC: 0.23 IU/ML (ref 0.3–0.7)
WBC NRBC COR # BLD AUTO: 8.05 10*3/MM3 (ref 3.4–10.8)

## 2024-06-29 PROCEDURE — 97530 THERAPEUTIC ACTIVITIES: CPT

## 2024-06-29 PROCEDURE — 25010000002 HEPARIN (PORCINE) 25000-0.45 UT/250ML-% SOLUTION

## 2024-06-29 PROCEDURE — 84295 ASSAY OF SERUM SODIUM: CPT | Performed by: INTERNAL MEDICINE

## 2024-06-29 PROCEDURE — 63710000001 PREDNISONE PER 1 MG: Performed by: STUDENT IN AN ORGANIZED HEALTH CARE EDUCATION/TRAINING PROGRAM

## 2024-06-29 PROCEDURE — 99232 SBSQ HOSP IP/OBS MODERATE 35: CPT | Performed by: INTERNAL MEDICINE

## 2024-06-29 PROCEDURE — 85027 COMPLETE CBC AUTOMATED: CPT | Performed by: INTERNAL MEDICINE

## 2024-06-29 PROCEDURE — 25810000003 SODIUM CHLORIDE 3 % SOLUTION: Performed by: STUDENT IN AN ORGANIZED HEALTH CARE EDUCATION/TRAINING PROGRAM

## 2024-06-29 PROCEDURE — 25010000002 PROCHLORPERAZINE 10 MG/2ML SOLUTION: Performed by: INTERNAL MEDICINE

## 2024-06-29 PROCEDURE — 80048 BASIC METABOLIC PNL TOTAL CA: CPT | Performed by: INTERNAL MEDICINE

## 2024-06-29 PROCEDURE — 84100 ASSAY OF PHOSPHORUS: CPT | Performed by: INTERNAL MEDICINE

## 2024-06-29 PROCEDURE — 25010000002 MAGNESIUM SULFATE 2 GM/50ML SOLUTION: Performed by: STUDENT IN AN ORGANIZED HEALTH CARE EDUCATION/TRAINING PROGRAM

## 2024-06-29 PROCEDURE — 25010000002 PROCHLORPERAZINE 10 MG/2ML SOLUTION: Performed by: STUDENT IN AN ORGANIZED HEALTH CARE EDUCATION/TRAINING PROGRAM

## 2024-06-29 PROCEDURE — 25010000002 MAGNESIUM SULFATE 2 GM/50ML SOLUTION: Performed by: INTERNAL MEDICINE

## 2024-06-29 PROCEDURE — 25010000002 DIPHENHYDRAMINE PER 50 MG: Performed by: STUDENT IN AN ORGANIZED HEALTH CARE EDUCATION/TRAINING PROGRAM

## 2024-06-29 PROCEDURE — 82948 REAGENT STRIP/BLOOD GLUCOSE: CPT

## 2024-06-29 PROCEDURE — 99233 SBSQ HOSP IP/OBS HIGH 50: CPT | Performed by: STUDENT IN AN ORGANIZED HEALTH CARE EDUCATION/TRAINING PROGRAM

## 2024-06-29 PROCEDURE — 25010000002 DIPHENHYDRAMINE PER 50 MG: Performed by: INTERNAL MEDICINE

## 2024-06-29 PROCEDURE — 83735 ASSAY OF MAGNESIUM: CPT | Performed by: INTERNAL MEDICINE

## 2024-06-29 PROCEDURE — 85520 HEPARIN ASSAY: CPT | Performed by: INTERNAL MEDICINE

## 2024-06-29 RX ORDER — SODIUM CHLORIDE 3 G/100ML
250 INJECTION, SOLUTION INTRAVENOUS ONCE
Status: COMPLETED | OUTPATIENT
Start: 2024-06-29 | End: 2024-06-29

## 2024-06-29 RX ADMIN — Medication 10 ML: at 08:41

## 2024-06-29 RX ADMIN — PROCHLORPERAZINE EDISYLATE 10 MG: 5 INJECTION INTRAMUSCULAR; INTRAVENOUS at 04:23

## 2024-06-29 RX ADMIN — SODIUM CHLORIDE 250 ML: 3 INJECTION, SOLUTION INTRAVENOUS at 11:53

## 2024-06-29 RX ADMIN — DIPHENHYDRAMINE HYDROCHLORIDE 12.5 MG: 50 INJECTION INTRAMUSCULAR; INTRAVENOUS at 11:52

## 2024-06-29 RX ADMIN — ATORVASTATIN CALCIUM 40 MG: 40 TABLET, FILM COATED ORAL at 20:46

## 2024-06-29 RX ADMIN — PANTOPRAZOLE SODIUM 40 MG: 40 INJECTION, POWDER, FOR SOLUTION INTRAVENOUS at 06:13

## 2024-06-29 RX ADMIN — DIPHENHYDRAMINE HYDROCHLORIDE 12.5 MG: 50 INJECTION INTRAMUSCULAR; INTRAVENOUS at 23:15

## 2024-06-29 RX ADMIN — DIPHENHYDRAMINE HYDROCHLORIDE 12.5 MG: 50 INJECTION INTRAMUSCULAR; INTRAVENOUS at 17:12

## 2024-06-29 RX ADMIN — SENNOSIDES AND DOCUSATE SODIUM 2 TABLET: 50; 8.6 TABLET ORAL at 08:41

## 2024-06-29 RX ADMIN — APIXABAN 5 MG: 5 TABLET, FILM COATED ORAL at 10:08

## 2024-06-29 RX ADMIN — MAGNESIUM SULFATE HEPTAHYDRATE 2 G: 2 INJECTION, SOLUTION INTRAVENOUS at 17:12

## 2024-06-29 RX ADMIN — MAGNESIUM SULFATE HEPTAHYDRATE 2 G: 2 INJECTION, SOLUTION INTRAVENOUS at 11:52

## 2024-06-29 RX ADMIN — Medication 10 ML: at 20:47

## 2024-06-29 RX ADMIN — VALPROATE SODIUM 800 MG: 100 INJECTION, SOLUTION INTRAVENOUS at 13:35

## 2024-06-29 RX ADMIN — GABAPENTIN 300 MG: 300 CAPSULE ORAL at 17:11

## 2024-06-29 RX ADMIN — MAGNESIUM SULFATE HEPTAHYDRATE 2 G: 2 INJECTION, SOLUTION INTRAVENOUS at 23:15

## 2024-06-29 RX ADMIN — GABAPENTIN 300 MG: 300 CAPSULE ORAL at 08:41

## 2024-06-29 RX ADMIN — PROCHLORPERAZINE EDISYLATE 10 MG: 5 INJECTION INTRAMUSCULAR; INTRAVENOUS at 17:12

## 2024-06-29 RX ADMIN — GABAPENTIN 300 MG: 300 CAPSULE ORAL at 20:46

## 2024-06-29 RX ADMIN — HEPARIN SODIUM 10 UNITS/KG/HR: 10000 INJECTION, SOLUTION INTRAVENOUS at 06:12

## 2024-06-29 RX ADMIN — MAGNESIUM SULFATE HEPTAHYDRATE 2 G: 2 INJECTION, SOLUTION INTRAVENOUS at 04:24

## 2024-06-29 RX ADMIN — PROCHLORPERAZINE EDISYLATE 10 MG: 5 INJECTION INTRAMUSCULAR; INTRAVENOUS at 11:53

## 2024-06-29 RX ADMIN — DIPHENHYDRAMINE HYDROCHLORIDE 12.5 MG: 50 INJECTION INTRAMUSCULAR; INTRAVENOUS at 04:24

## 2024-06-29 RX ADMIN — ACETAMINOPHEN 1000 MG: 500 TABLET, FILM COATED ORAL at 04:24

## 2024-06-29 RX ADMIN — APIXABAN 5 MG: 5 TABLET, FILM COATED ORAL at 20:46

## 2024-06-29 RX ADMIN — PREDNISONE 20 MG: 20 TABLET ORAL at 08:41

## 2024-06-29 RX ADMIN — PROCHLORPERAZINE EDISYLATE 10 MG: 5 INJECTION INTRAMUSCULAR; INTRAVENOUS at 23:15

## 2024-06-29 NOTE — PLAN OF CARE
Goal Outcome Evaluation:  Plan of Care Reviewed With: patient        Progress: improving  Outcome Evaluation: Pt continues to present with decreased functional mobility and pain limiting activity tolerance. Pt ambulated 400ft with CGA and BUE support on tele monitor. Continue to progress per pt tolerance.      Anticipated Discharge Disposition (PT): home with assist

## 2024-06-29 NOTE — PLAN OF CARE
Goal Outcome Evaluation:  Plan of Care Reviewed With: patient        Progress: improving  Outcome Evaluation: Pt brought  up to floor this afternoon. HA was better when first arrived, but has since come back. Scheduled medication given. Pt resting at this time.  at beside. VSS, will cont to monitor.

## 2024-06-29 NOTE — PROGRESS NOTES
HEPARIN INFUSION  Edna Motley is a  43 y.o. female receiving heparin infusion.     Therapy for (VTE/Cardiac): Cardiac   Patient Weight: 90.7 kg  Initial Bolus (Y/N): No  Any Bolus (Y/N): No bolus ever      Signs or Symptoms of Bleeding: No S/Sx overt bleeding noted - d/w RN.    Cardiac or Other (Not VTE)  Initial rate: 12 units/kg/hr (Max 1,000 units/hr)   Anti Xa Rebolus Infusion Hold time Change infusion Dose (Units/kg/hr) Next Anti Xa or aPTT Level Due   < 0.11 None Increase by  3 Units/kg/hr 6 hours   0.11- 0.19 None Increase by  2 Units/kg/hr 6 hours   0.2 - 0.29 0 None Increase by  1 Units/kg/hr 6 hours   0.3 - 0.5 0 None No Change 6 hours (after 2 consecutive levels in range check qAM)   0.51 - 0.6 0 None Decrease by  1 Units/kg/hr 6 hours   0.61 - 0.8 0 30 Minutes Decrease by  2 Units/kg/hr 6 hours   0.81 - 1 0 60 Minutes Decrease by  3 Units/kg/hr 6 hours   >1 0 Hold  After Anti Xa less than 0.5 decrease previous rate by  4 Units/kg/hr  Every 2 hours until Anti Xa  less than 0.5 then when infusion restarts in 6 hours     Results from last 7 days   Lab Units 06/28/24  0439 06/27/24  0520 06/26/24  1631   INR   --   --  0.96   HEMOGLOBIN g/dL 15.7 13.9 15.9   HEMATOCRIT % 47.1* 40.1 46.5   PLATELETS 10*3/mm3 213 227 254        Date   Time   Anti-Xa Current Rate (Unit/kg/hr) Bolus   (Units) Rate Change   (Unit/kg/hr) New Rate (Unit/kg/hr) Next   Anti-Xa Comments  Pump Check Daily   6/26 1756 0.1 -- No bolus +11 11 0000 D/w RN   6/27 0030 0.41 11 -- -- 11 0600 D/w RN   6/27 0630 0.96 11 -- -3 8 1200 D/w RN, hold 1 hour   6/27 1209 0.21 8 -- +1 9 1900 D/w BLADIMIR Alvarez;   rate verified   6/27 1930 0.27 9 -- +1 10 0200 DW RN   6/28 0330 0.36 10 -- -- 10 0800 Drawn during downtime at ~02:00  Priscilla [id number] [facility]   6/28 0753 0.36 10 -- -- 10 0600 D/w RN   6/28 1400 PUMP CHECK 10 -- -- 10 0600 Verified weight and rate in the pump   6/29 0649 0.23 10 -- +1 11 1400 D/w BLADIMIR Winston;   Rate verified       --           --            --           --           --           --           --           --           --           --           --           --         Thank you,  Farhat King, PharmD  06/29/24

## 2024-06-29 NOTE — THERAPY TREATMENT NOTE
Patient Name: Edna Motley  : 1980    MRN: [id number]                              Today's Date: 2024       Admit Date: 2024    Visit Dx:     ICD-10-CM ICD-9-CM   1. Dissection of extracranial vertebral artery  I77.74 443.24     Patient Active Problem List   Diagnosis    Migraines    Family history of malignant neoplasm of breast    Massey syndrome    Malignant neoplasm of lower-outer quadrant of left breast of female, estrogen receptor negative    Well woman exam with routine gynecological exam    Bone pain    Vaginal dryness, menopausal    Lumbar discogenic pain syndrome    Spondylosis of lumbar region without myelopathy or radiculopathy    DDD (degenerative disc disease), lumbar    Myofascial pain    Mild obesity    Abnormal PET scan, mediastinum    History of left breast cancer    Hyperlipidemia    History of malignant neoplasm of breast    Dissection, vertebral artery     Past Medical History:   Diagnosis Date    Cluster headache All my life    It’s like it get better with medicine but then comes back    Constipation     CTS (carpal tunnel syndrome)     Depression Off and on    When I’m hurting more I get more down    Difficulty walking Worse back leg etc    Mostly mornings when I wake up or after sitting a long time    Endometriosis     When uterus removed was told had this    Generalized headaches     Headache, tension-type Everyday    Heart murmur     Hyperlipidemia 2023    Massey syndrome 2016    Massey syndrome     Malignant neoplasm of lower-outer quadrant of left female breast 2016    Migraines 2016    Multiple gestation     I have a 13 and 15 year old    Osteoarthritis I think I have this    Pain  I believe checked    Osteoporosis Pain everywhere    Pelvic pain 2016    Wears glasses     Well woman exam with routine gynecological exam 10/25/2017     Past Surgical History:   Procedure Laterality Date    BREAST BIOPSY  16    Result cancer     BREAST RECONSTRUCTION, BREAST TISSUE EXPANDER INSERTION Bilateral 11/01/2016    Procedure: BILATERAL BREAST IMMEDIATE RECONSTRUCTION, BREAST TISSUE EXPANDER INSERTION;  Surgeon: Kenny Massey MD;  Location:  [facility];  Service:     BREAST RECONSTRUCTION, BREAST TISSUE EXPANDER REMOVAL, IMPLANT INSERTION Left 06/09/2017    Procedure: PLACEMENT OF LEFT RECONSTRUCTED BREAST TISSUE EXPANDER ;  Surgeon: Kenny Massey MD;  Location:  [facility];  Service:     BREAST SURGERY      COLONOSCOPY  10/14/2016    DILATION AND CURETTAGE, DIAGNOSTIC / THERAPEUTIC      x2    HYSTERECTOMY      MASTECTOMY W/ SENTINEL NODE BIOPSY Bilateral 11/01/2016    Procedure: BILATERAL BREAST MASTECTOMY WITH LEFT SENTINEL NODE BIOPSY POSS AXILLARY NODE DISECTION;  Surgeon: Jose F Johnson MD;  Location:  [facility];  Service:     OOPHORECTOMY      TOTAL ABDOMINAL HYSTERECTOMY WITH SALPINGO OOPHORECTOMY  09/01/2011    Endometriosis    UPPER GASTROINTESTINAL ENDOSCOPY  11/19/2014    VENOUS ACCESS DEVICE (PORT) REMOVAL Right 03/17/2017    WISDOM TOOTH EXTRACTION  08/01/1999    3 teeth removed      General Information       Row Name 06/29/24 1517          Physical Therapy Time and Intention    Document Type therapy note (daily note)  -AE     Mode of Treatment physical therapy  -AE       Row Name 06/29/24 1517          General Information    Patient Profile Reviewed yes  -AE     Existing Precautions/Restrictions fall;other (see comments)  migraines  -AE     Barriers to Rehab medically complex;visual deficit  -AE       Row Name 06/29/24 1517          Cognition    Orientation Status (Cognition) oriented x 4  -AE       Row Name 06/29/24 1517          Safety Issues, Functional Mobility    Safety Issues Affecting Function (Mobility) awareness of need for assistance;insight into deficits/self-awareness;safety precaution awareness;safety precautions follow-through/compliance  -AE     Impairments Affecting Function (Mobility)  coordination;endurance/activity tolerance;strength;visual/perceptual;pain  -AE               User Key  (r) = Recorded By, (t) = Taken By, (c) = Cosigned By      Initials Name Provider Type    AE Farhat Live PT Physical Therapist                   Mobility       Row Name 06/29/24 1519          Bed Mobility    Bed Mobility supine-sit;sit-supine  -AE     Supine-Sit [place] (Bed Mobility) standby assist  -AE     Sit-Supine [place] (Bed Mobility) standby assist  -AE     Assistive Device (Bed Mobility) bed rails;head of bed elevated  -AE     Comment, (Bed Mobility) No cues required for bed mobility. Good use of bed rails to assist with supine to sit.  -AE       Row Name 06/29/24 1519          Transfers    Comment, (Transfers) VCs for hand placement and sequencing. Pt demo good balance with STS.  -AE       Row Name 06/29/24 1519          Sit-Stand Transfer    Sit-Stand [place] (Transfers) standby assist;verbal cues  -AE       Row Name 06/29/24 1519          Gait/Stairs (Locomotion)    [place] Level (Gait) contact guard;verbal cues  -AE     Assistive Device (Gait) other (see comments)  BUE support on tele monitor  -AE     Distance in Feet (Gait) 400  -AE     Deviations/Abnormal Patterns (Gait) bilateral deviations;tasha decreased;gait speed decreased  -AE     Bilateral Gait Deviations heel strike decreased  -AE     Comment, (Gait/Stairs) Pt demo step through gait pattern with slowed tasha and decreased gait speed. Pt required increased cues to improve initial balance and sequencing with use of tele monitor for support. Pt demo good effort and endurance with activity this session. Further distance limited by fatigue.  -AE               User Key  (r) = Recorded By, (t) = Taken By, (c) = Cosigned By      Initials Name Provider Type    Farhat Hussein PT Physical Therapist                   Obj/Interventions       Row Name 06/29/24 1523          Balance    Balance Assessment sitting static  balance;sitting dynamic balance;sit to stand dynamic balance;standing static balance;standing dynamic balance  -AE     Static Sitting Balance standby assist  -AE     Dynamic Sitting Balance standby assist  -AE     Position, Sitting Balance unsupported;sitting edge of bed  -AE     Sit to Stand Dynamic Balance standby assist  -AE     Static Standing Balance standby assist  -AE     Dynamic Standing Balance contact guard  -AE     Position/Device Used, Standing Balance supported  -AE               User Key  (r) = Recorded By, (t) = Taken By, (c) = Cosigned By      Initials Name Provider Type    AE Farhat Live, PT Physical Therapist                   Goals/Plan    No documentation.                  Clinical Impression       Row Name 06/29/24 1523          Pain    Pain Intervention(s) Repositioned;Ambulation/increased activity  -AE     Additional Documentation Pain Scale: FACES Pre/Post-Treatment (Group)  -AE       Row Name 06/29/24 1523          Pain Scale: FACES Pre/Post-Treatment    Pain: FACES Scale, Pretreatment 2-->hurts little bit  -AE     Posttreatment Pain Rating 2-->hurts little bit  -AE     Pain Location generalized  -AE     Pain Location - head  -AE     Pre/Posttreatment Pain Comment pt wore sunglasses while ambulating in [place] d/t light sensitivity; RN aware and managing  -AE       Row Name 06/29/24 1523          Plan of Care Review    Plan of Care Reviewed With patient  -AE     Progress improving  -AE     Outcome Evaluation Pt continues to present with decreased functional mobility and pain limiting activity tolerance. Pt ambulated 400ft with CGA and BUE support on tele monitor. Continue to progress per pt tolerance.  -AE       Row Name 06/29/24 1523          Vital Signs    Pre Systolic BP Rehab 143  -AE     Pre Treatment Diastolic BP 82  -AE     Post Systolic BP Rehab 139  -AE     Post Treatment Diastolic BP 82  -AE     Pretreatment Heart Rate (beats/min) 103  -AE     Intratreatment Heart Rate  (beats/min) 123  -AE     Posttreatment Heart Rate (beats/min) 101  -AE     Pre SpO2 (%) 96  -AE     O2 Delivery Pre Treatment room air  -AE     O2 Delivery Intra Treatment room air  -AE     Post SpO2 (%) 96  -AE     O2 Delivery Post Treatment room air  -AE     Pre Patient Position Supine  -AE     Intra Patient Position Standing  -AE     Post Patient Position Supine  -AE       Row Name 06/29/24 1523          Positioning and Restraints    Pre-Treatment Position in bed  -AE     Post Treatment Position bed  -AE     In Bed notified nsg;supine;call light within reach;encouraged to call for assist;side rails up x2  exit alarm unchanged  -AE               User Key  (r) = Recorded By, (t) = Taken By, (c) = Cosigned By      Initials Name Provider Type    AE Farhat Live PT Physical Therapist                   Outcome Measures       Row Name 06/29/24 1538          How much help from another person do you currently need...    Turning from your back to your side while in flat bed without using bedrails? 4  -AE     Moving from lying on back to sitting on the side of a flat bed without bedrails? 3  -AE     Moving to and from a bed to a chair (including a wheelchair)? 3  -AE     Standing up from a chair using your arms (e.g., wheelchair, bedside chair)? 3  -AE     Climbing 3-5 steps with a railing? 3  -AE     To walk in hospital room? 3  -AE     AM-PAC 6 Clicks Score (PT) 19  -AE     Highest Level of Mobility Goal 6 --> Walk 10 steps or more  -AE       Row Name 06/29/24 1538          Functional Assessment    Outcome Measure Options AM-PAC 6 Clicks Basic Mobility (PT)  -AE               User Key  (r) = Recorded By, (t) = Taken By, (c) = Cosigned By      Initials Name Provider Type    AE Farhat Live PT Physical Therapist                                 Physical Therapy Education       Title: PT OT SLP Therapies (In Progress)       Topic: Physical Therapy (In Progress)       Point: Mobility training (Done)       Learning  Progress Summary             Patient Acceptance, E, VU by AE at 6/29/2024 1411    Acceptance, E, VU,NR by KG at 6/27/2024 0841                         Point: Home exercise program (Not Started)       Learner Progress:  Not documented in this visit.              Point: Body mechanics (Done)       Learning Progress Summary             Patient Acceptance, E, VU by AE at 6/29/2024 1411    Acceptance, E, VU,NR by KG at 6/27/2024 0841                         Point: Precautions (Done)       Learning Progress Summary             Patient Acceptance, E, VU by AE at 6/29/2024 1411    Acceptance, E, VU,NR by KG at 6/27/2024 0841                                         User Key       Initials Effective Dates Name Provider Type Discipline    KG 05/22/20 -  Ness Ellison PT Physical Therapist PT    AE 09/21/21 -  Farhat Live PT Physical Therapist PT                  PT Recommendation and Plan     Plan of Care Reviewed With: patient  Progress: improving  Outcome Evaluation: Pt continues to present with decreased functional mobility and pain limiting activity tolerance. Pt ambulated 400ft with CGA and BUE support on tele monitor. Continue to progress per pt tolerance.     Time Calculation:         PT Charges       Row Name 06/29/24 1539             Time Calculation    Start Time 1411  -AE      PT Received On 06/29/24  -AE      PT Goal Re-Cert Due Date 07/07/24  -AE         Timed Charges    69893 - PT Therapeutic Activity Minutes 24  -AE         Total Minutes    Timed Charges Total Minutes 24  -AE       Total Minutes 24  -AE                User Key  (r) = Recorded By, (t) = Taken By, (c) = Cosigned By      Initials Name Provider Type    AE Farhat Live PT Physical Therapist                  Therapy Charges for Today       Code Description Service Date Service Provider Modifiers Qty    [id number]  PT THERAPEUTIC ACT EA 15 MIN 6/29/2024 Farhat Live PT GP 2            PT G-Codes  Outcome Measure Options:  AM-PAC 6 Clicks Basic Mobility (PT)  AM-PAC 6 Clicks Score (PT): 19  AM-PAC 6 Clicks Score (OT): 21  Modified [place] Scale: 2 - Slight disability.  Unable to carry out all previous activities but able to look after own affairs without assistance.  PT Discharge Summary  Anticipated Discharge Disposition (PT): home with assist    Farhat Live, PT  6/29/2024

## 2024-06-29 NOTE — PROGRESS NOTES
Stroke Neurology Progress Note     Subjective     This patient was seen in follow-up for: Multifocal dissections in bilateral vertebral arteries and basilar artery  Present for the encounter were: self, patient, patient's     Subjective:  Patient reports 5/10 headache, improved from yesterday.  Patient aware that posterior circulation stroke and dissections are expected to cause headaches which could be intractable.  All questions and concerns were answered.  Physical exam continues to be normal.    Objective      Temp:  [97.8 °F (36.6 °C)-98.4 °F (36.9 °C)] 97.8 °F (36.6 °C)  Heart Rate:  [] 94  Resp:  [16-18] 16  BP: (114-147)/() 147/67            Objective    Physical Exam:  General Appearance: Alert  HEENT: anicteric sclera, no scleral injection  Lungs: respirations appear comfortable, no obvious increased work of breathing  Extremities: No cyanosis or fingernail clubbing   Skin: No rashes in exposed skin areas     Neurological Examination:   Mental status: Alert and oriented. No dysarthria. Able to name and repeat.  Cranial Nerves: Visual fields intact. Extraocular movements intact with no nystagmus. Midline gaze. Face symmetric.    Sensory: Normal sensory exam to light touch.  Motor: Normal tone. Absent pronator drift.  Strength:  LUE: 5/5 biceps, triceps,   LLE: 5/5 hip flexion/extension  RUE: 5/5 biceps, triceps,   RLE:  5/5 hip flexion/extension  Cerebellar: Finger-to-nose intact. Heel-to-shin intact. Rapid alternating movements are intact.   Gait: Not assessed.     Labs:    Lab Results   Component Value Date    HGBA1C 5.20 06/27/2024      Lab Results   Component Value Date    CHOL 186 06/27/2024    TRIG 282 (H) 06/27/2024    HDL 63 (H) 06/27/2024    LDL 78 06/27/2024       Lab Results   Component Value Date    WBC 8.05 06/29/2024    HGB 15.8 06/29/2024    HCT 46.8 (H) 06/29/2024    MCV 93.2 06/29/2024     06/29/2024     Lab Results   Component Value Date    GLUCOSE 111 (H)  "06/29/2024    BUN 8 06/29/2024    CREATININE 0.76 06/29/2024    EGFRIFNONA 78 12/23/2019    BCR 10.5 06/29/2024    CO2 21.0 (L) 06/29/2024    CALCIUM 8.9 06/29/2024    ALBUMIN 3.6 06/27/2024    AST 15 06/27/2024    ALT 23 06/27/2024       Results from last 7 days   Lab Units 06/29/24  0649 06/28/24  1803 06/28/24  0753 06/28/24  0343 06/27/24 2023 06/27/24  0520 06/26/24  1631   SODIUM mmol/L 135* 135* 138 137   < > 136 139  138   POTASSIUM mmol/L 4.6  --   --  4.1  --  4.1 3.8   CHLORIDE mmol/L 98  --   --  99  --  101 100   CO2 mmol/L 21.0*  --   --  27.0  --  27.0 27.0   BUN mg/dL 8  --   --  9  --  9 13   CREATININE mg/dL 0.76  --   --  0.87  --  0.66 0.88   CALCIUM mg/dL 8.9  --   --  9.0  --  8.4* 9.3   BILIRUBIN mg/dL  --   --   --   --   --  0.5 0.5   ALK PHOS U/L  --   --   --   --   --  74 90   ALT (SGPT) U/L  --   --   --   --   --  23 29   AST (SGOT) U/L  --   --   --   --   --  15 18   GLUCOSE mg/dL 111*  --   --  101*  --  97 120*    < > = values in this interval not displayed.       No results found for: \"BLOODCX\"  UA          11/8/2023    11:19 6/26/2024    17:01   Urinalysis   Squamous Epithelial Cells, UA 0-2  0-2    Specific Gravity, UA <=1.005  1.017    Ketones, UA Negative  Negative    Blood, UA Negative  Negative    Leukocytes, UA Small (1+)  Negative    Nitrite, UA Negative  Negative    RBC, UA 0-2  0-2    WBC, UA 3-5  0-2    Bacteria, UA None Seen  None Seen      Lab Results   Component Value Date    URINECX No growth 11/08/2023       Results Review:      All brain images and reports were personally reviewed and I agree with the interpretations except as noted below.      MRI Brain Without Contrast 6/26/2024  Impression: Multiple infarcts of the right cerebellar hemisphere predominantly lacunar.     CT Head Without Contrast Stroke Protocol 6/26/2024  Impression: No acute intracranial abnormality.     CT Angiogram Head and Neck 6/26/2024  Impression: Bilateral extracranial vertebral artery " dissections as above. The basilar artery likewise appears dissected.           Assessment/Plan     Assessment:    # Bilateral vertebral artery dissections  # Basilar artery dissection  # Right cerebellar infarct    -Continue maximal medical management  -Transition to apixaban 5 mg BID on 6/29/2024; recommend monitor for 24 more hours thereafter. Anticipate duration at least 6 months to 1 year with at least antiplatelet monotherapy lifelong thereafter.    -Continue atorvastatin 40 mg daily  -Serum sodium 135, will bolus 250 cc 3% NS  -Repeat serum sodium daily   -Low threshold to repeat MRI with change in neurologic status, recurrent stroke is possible despite MMM particularly in the setting of multiple posterior circulation dissections.   -Low threshold to start osmotic therapies if concern for posterior fossa swelling (unlikely, current stroke burden small per MRI 6/26)  -SBP <180 but please allow autoregulation as able  -Discussed with Neurosurgery, intervention could be considered if patient declines  -DVT prophylaxis SCD  -Neurochecks per unit protocol  -PT OT recommend home  -Advised dissection precautions including avoidance of cervical manipulation, cervical trauma, sports injuries, or whiplash movements    # Acute on chronic headaches    -I advised that headaches will be expected over the next few months while dissections heal  -Inpatient treatment: Valproic acid 800 mg IV daily for 3 days, Benadryl 12.5 mg every 6 hours for 3 days, prochlorperazine 10 mg IV every 6 hours for 3 days, magnesium sulfate 2 g IV every 6 hours for 3 days, prednisone 20 mg daily for 3 days, IV fluids.  Tylenol 1000 mg every 8 hours as needed.  -If above regimen ineffective, can consider one-time dose opioid medication  -Avoid NSAIDs to reduce bleed risk  -Patient follows in headache clinic  -Patient due for Botox July 19  -Recommend outpatient nerve block next available  -If headaches continue to be intractable, we can provide  patient with work excuse while dissections heal. Also expected with posterior circulation stroke.       Stroke neurology will follow.  Anticipate home 6/30.  Please call with questions.     Akua Franklin MD  [facility] [place] STROKE NEURO  06/29/24  10:34 EDT

## 2024-06-29 NOTE — PROGRESS NOTES
INTENSIVIST   PROGRESS NOTE     Hospital:  LOS: 3 days     Ms. Edna Motley, 43 y.o. female is followed for a Chief Complaint of: CVA      Subjective   S     Interval History:  No acute events. Continues to have a headache. Getting headache protocol.        The patient's relevant past medical, surgical and social history were reviewed and updated in Epic as appropriate.      ROS:   Constitutional: Negative for fever.   Respiratory: Negative for dyspnea.   Cardiovascular: Negative for chest pain.   Gastrointestinal: Negative for  nausea, vomiting and diarrhea.     Objective   O     Vitals:  Temp  Min: 97.8 °F (36.6 °C)  Max: 98.4 °F (36.9 °C)  BP  Min: 114/86  Max: 147/67  Pulse  Min: 77  Max: 116  Resp  Min: 16  Max: 18  SpO2  Min: 90 %  Max: 98 % No data recorded    Intake/Ouptut 24 hrs (7:00AM - 6:59 AM)  Intake & Output (last 3 days)         06/24 0701  06/25 0700 06/25 0701  06/26 0700 06/26 0701  06/27 0700 06/27 0701  06/28 0700    P.O.    354    I.V. (mL/kg)   159.6 (1.8)     Total Intake(mL/kg)   159.6 (1.8) 354 (3.9)    Urine (mL/kg/hr)    1000 (1.2)    Total Output    1000    Net   +159.6 -646            Urine Unmeasured Occurrence   2 x             Medications (drips):           Physical Examination  Telemetry:  Normal sinus rhythm.    Constitutional:  No acute distress.  Resting in bed with the lights off and sunglasses on.    Eyes: No scleral icterus.   PERRL, EOM intact.    Neck:  Supple, FROM   Cardiovascular: Normal rate, regular and rhythm. Normal heart sounds.  No murmurs, gallop or rub.   Respiratory: No respiratory distress. Normal respiratory effort.  Normal breath sounds  Clear to auscultation   Abdominal:  Soft. No masses. Nontender. No distension. No HSM.   Extremities: No digital cyanosis. No clubbing.  No peripheral edema.   Skin: No rashes, lesions or ulcers   Neurological:   Alert and interactive.              Results from last 7 days   Lab Units 06/29/24  0649 06/28/24  0439  06/27/24  0520   WBC 10*3/mm3 8.05 6.68 8.61   HEMOGLOBIN g/dL 15.8 15.7 13.9   MCV fL 93.2 94.2 89.1   PLATELETS 10*3/mm3 241 213 227     Results from last 7 days   Lab Units 06/29/24  0649 06/28/24  1803 06/28/24  0753 06/28/24  0343 06/27/24  2023 06/27/24  0520   SODIUM mmol/L 135* 135* 138 137   < > 136   POTASSIUM mmol/L 4.6  --   --  4.1  --  4.1   CO2 mmol/L 21.0*  --   --  27.0  --  27.0   CREATININE mg/dL 0.76  --   --  0.87  --  0.66   GLUCOSE mg/dL 111*  --   --  101*  --  97   MAGNESIUM mg/dL 3.1*  --   --  2.5  --  2.6   PHOSPHORUS mg/dL 3.9  --   --  3.9  --  4.1    < > = values in this interval not displayed.     Estimated Creatinine Clearance: 108.6 mL/min (by C-G formula based on SCr of 0.76 mg/dL).  Results from last 7 days   Lab Units 06/27/24  0520 06/26/24  1631   ALK PHOS U/L 74 90   BILIRUBIN mg/dL 0.5 0.5   ALT (SGPT) U/L 23 29   AST (SGOT) U/L 15 18             Images:  Imaging Results (Last 24 Hours)       ** No results found for the last 24 hours. **               Results: Reviewed.  I reviewed the patient's new laboratory and imaging results.  I independently reviewed the patient's new images.    Medications: Reviewed.    Assessment & Plan   A / P     Ms. Motley is a 42yo F with a history of Massey Syndrome, breast cancer and chronic migraines who presented to the [facility] ED on 6/26 via EMS from her chiropractor's office after developing acute onset dizziness, nausea and vomiting. 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 She was seen by Neurology and Neurosurgery who recommended blood pressure control and a heparin infusion.     Nutrition:   Diet: Regular/House; Fluid Consistency: Thin (IDDSI 0)  Advance Directives:   Code Status and Medical Interventions:   Ordered at: 06/27/24 0036     Code Status (Patient has no pulse and is not breathing):    CPR (Attempt to Resuscitate)     Medical Interventions (Patient has pulse or is breathing):     Full Support       Active Hospital Problems    Diagnosis     **Dissection, vertebral artery     Migraines        Assessment / Plan:    Transition heparin drip to Eliquis.   Neurology following. Headache protocol x 3 days.   Monitor sodium levels and avoid hyponatremia.   Repeat MRI for change in neurologic status.   Blood pressure control for SBP < 180.   AM labs  Okay to transfer telemetry.     High level of risk due to:  severe exacerbation of chronic illness and illness with threat to life or bodily function.    Plan of care and goals reviewed during interdisciplinary rounds.  I discussed the patient's findings and my recommendations with patient and nursing staff      Rashmi Schultz, DO    Intensive Care Medicine and Pulmonary Medicine

## 2024-06-30 ENCOUNTER — READMISSION MANAGEMENT (OUTPATIENT)
Dept: CALL CENTER | Facility: HOSPITAL | Age: 44
End: 2024-06-30
Payer: COMMERCIAL

## 2024-06-30 VITALS
RESPIRATION RATE: 18 BRPM | OXYGEN SATURATION: 93 % | BODY MASS INDEX: 32.35 KG/M2 | HEART RATE: 88 BPM | SYSTOLIC BLOOD PRESSURE: 114 MMHG | DIASTOLIC BLOOD PRESSURE: 76 MMHG | WEIGHT: 201.28 LBS | HEIGHT: 66 IN | TEMPERATURE: 97.6 F

## 2024-06-30 DIAGNOSIS — I77.74 VERTEBRAL ARTERY DISSECTION: Primary | ICD-10-CM

## 2024-06-30 LAB
GLUCOSE BLDC GLUCOMTR-MCNC: 110 MG/DL (ref 70–130)
SODIUM SERPL-SCNC: 135 MMOL/L (ref 136–145)

## 2024-06-30 PROCEDURE — 25010000002 MAGNESIUM SULFATE 2 GM/50ML SOLUTION: Performed by: INTERNAL MEDICINE

## 2024-06-30 PROCEDURE — 84295 ASSAY OF SERUM SODIUM: CPT | Performed by: INTERNAL MEDICINE

## 2024-06-30 PROCEDURE — 82948 REAGENT STRIP/BLOOD GLUCOSE: CPT

## 2024-06-30 PROCEDURE — 25010000002 PROCHLORPERAZINE 10 MG/2ML SOLUTION: Performed by: INTERNAL MEDICINE

## 2024-06-30 PROCEDURE — 99239 HOSP IP/OBS DSCHRG MGMT >30: CPT | Performed by: INTERNAL MEDICINE

## 2024-06-30 PROCEDURE — 99233 SBSQ HOSP IP/OBS HIGH 50: CPT | Performed by: STUDENT IN AN ORGANIZED HEALTH CARE EDUCATION/TRAINING PROGRAM

## 2024-06-30 PROCEDURE — 63710000001 PREDNISONE PER 1 MG: Performed by: INTERNAL MEDICINE

## 2024-06-30 PROCEDURE — 25010000002 DIPHENHYDRAMINE PER 50 MG: Performed by: INTERNAL MEDICINE

## 2024-06-30 RX ORDER — ATORVASTATIN CALCIUM 40 MG/1
40 TABLET, FILM COATED ORAL NIGHTLY
Qty: 30 TABLET | Refills: 0 | Status: SHIPPED | OUTPATIENT
Start: 2024-06-30 | End: 2024-07-30

## 2024-06-30 RX ADMIN — SENNOSIDES AND DOCUSATE SODIUM 2 TABLET: 50; 8.6 TABLET ORAL at 08:17

## 2024-06-30 RX ADMIN — PROCHLORPERAZINE EDISYLATE 10 MG: 5 INJECTION INTRAMUSCULAR; INTRAVENOUS at 05:21

## 2024-06-30 RX ADMIN — PREDNISONE 20 MG: 20 TABLET ORAL at 08:17

## 2024-06-30 RX ADMIN — APIXABAN 5 MG: 5 TABLET, FILM COATED ORAL at 08:17

## 2024-06-30 RX ADMIN — GABAPENTIN 300 MG: 300 CAPSULE ORAL at 08:17

## 2024-06-30 RX ADMIN — MAGNESIUM SULFATE HEPTAHYDRATE 2 G: 2 INJECTION, SOLUTION INTRAVENOUS at 05:21

## 2024-06-30 RX ADMIN — DIPHENHYDRAMINE HYDROCHLORIDE 12.5 MG: 50 INJECTION INTRAMUSCULAR; INTRAVENOUS at 05:21

## 2024-06-30 NOTE — DISCHARGE INSTRUCTIONS
Please do not take your imitrex for migraines. This has been discontinued due to increased stroke risk.

## 2024-06-30 NOTE — DISCHARGE SUMMARY
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          Hospital Course:  Edna Motley is a 43 y.o. female with history of Massey syndrome, breast cancer and chronic migraines who presented to the ED from the chiropractor's office after developing acute onset dizziness, nausea, vomiting and diuresis.  On presentation she had neck pain bilaterally.  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  She was admitted to the ICU.  Neuro neurology and neurosurgery were consulted and recommended blood pressure control.  She was started on a heparin drip.  TTE was performed and showed a PFO.  She was transition from the heparin drip to Eliquis.  She was also started on Eliquis.  Transferred out of the ICU on 2024.    During admission patient developed significant headaches.  Neurology followed.  She received valproic acid, Benadryl, prochlorperazine, magnesium sulfate, prednisone, Tylenol and IV fluids for headache.  Patient is through avoid NSAIDs due to bleeding risk.  She will follow-up with the headache clinic.  She also has a follow-up scheduled with Dr. Rocha on 2024. Discussed plan with Dr. Franklin prior to discharge. Her imitrex was discontinued due to increased stroke risk.     Patient was started on Eliquis during admission.  Neurology recommends continuing this for at least 6 months to 1  year with at least antiplatelet monotherapy lifelong thereafter.    At discharge patient is doing well, she has no neurological deficits.  She continues to have posterior headache pain.      Discharge Follow Up Recommendations for outpatient labs/diagnostics:  Follow-up with Dr. Rocha on 7/19/2024    Day of Discharge     HPI:   Patient is doing well this morning and wants to go home. Still having a basilar headache.   Vital Signs:   Temp:  [97.5 °F (36.4 °C)-98.7 °F (37.1 °C)] 97.6 °F (36.4 °C)  Heart Rate:  [] 88  Resp:  [16-18] 18  BP: (114-146)/() 114/76      Physical Exam:  Constitutional: No acute distress, awake, alert, sitting on couch  Respiratory: Clear to auscultation bilaterally, respiratory effort normal   Cardiovascular: RRR, no murmurs, rubs, or gallops  Gastrointestinal: Positive bowel sounds, soft, nontender, nondistended  Musculoskeletal: No bilateral ankle edema  Psychiatric: Appropriate affect, cooperative  Neurologic: Oriented x 3, no focal neurological deficits      Pertinent  and/or Most Recent Results     LAB RESULTS:      Lab 06/29/24  0649 06/28/24  0753 06/28/24  0439 06/28/24  0330 06/27/24  1930 06/27/24  1209 06/27/24  0520 06/27/24  0017 06/26/24  1631   WBC 8.05  --  6.68  --   --   --  8.61  --  10.63   HEMOGLOBIN 15.8  --  15.7  --   --   --  13.9  --  15.9   HEMATOCRIT 46.8*  --  47.1*  --   --   --  40.1  --  46.5   PLATELETS 241  --  213  --   --   --  227  --  254   NEUTROS ABS  --   --   --   --   --   --  5.86  --  7.31*   IMMATURE GRANS (ABS)  --   --   --   --   --   --  0.04  --  0.08*   LYMPHS ABS  --   --   --   --   --   --  2.15  --  2.45   MONOS ABS  --   --   --   --   --   --  0.52  --  0.73   EOS ABS  --   --   --   --   --   --  0.02  --  0.03   MCV 93.2  --  94.2  --   --   --  89.1  --  90.8   PROTIME  --   --   --   --   --   --   --   --  12.9   APTT  --   --   --   --   --   --   --   --  22.2*   HEPARIN ANTI-XA 0.23* 0.36  --  0.36 0.27* 0.21*  0.96*   < > 0.10*    < > = values in this interval not displayed.         Lab 06/30/24  0858 06/29/24  1816 06/29/24  0649 06/28/24  1803 06/28/24  0753 06/28/24  0343 06/27/24 2023 06/27/24  0520 06/26/24  1631   SODIUM 135* 138 135* 135* 138 137   < > 136 139  138   POTASSIUM  --   --  4.6  --   --  4.1  --  4.1 3.8   CHLORIDE  --   --  98  --   --  99  --  101 100   CO2  --   --  21.0*  --   --  27.0  --  27.0 27.0   ANION GAP  --   --  16.0*  --   --  11.0  --  8.0 11.0   BUN  --   --  8  --   --  9  --  9 13   CREATININE  --   --  0.76  --   --  0.87  --  0.66 0.88   EGFR  --   --  99.9  --   --  84.9  --  111.8 83.7   GLUCOSE  --   --  111*  --   --  101*  --  97 120*   CALCIUM  --   --  8.9  --   --  9.0  --  8.4* 9.3   MAGNESIUM  --   --  3.1*  --   --  2.5  --  2.6 2.6   PHOSPHORUS  --   --  3.9  --   --  3.9  --  4.1  --    HEMOGLOBIN A1C  --   --   --   --   --   --   --  5.20  --    TSH  --   --   --   --   --   --   --   --  1.510    < > = values in this interval not displayed.         Lab 06/27/24  0520 06/26/24  1631   TOTAL PROTEIN 6.3 7.5   ALBUMIN 3.6 4.1   GLOBULIN 2.7 3.4   ALT (SGPT) 23 29   AST (SGOT) 15 18   BILIRUBIN 0.5 0.5   ALK PHOS 74 90         Lab 06/26/24  1631   HSTROP T <6   PROTIME 12.9   INR 0.96         Lab 06/27/24  0520   CHOLESTEROL 186   LDL CHOL 78   HDL CHOL 63*   TRIGLYCERIDES 282*             Brief Urine Lab Results  (Last result in the past 365 days)        Color   Clarity   Blood   Leuk Est   Nitrite   Protein   CREAT   Urine HCG        06/26/24 1701 Yellow   Clear   Negative   Negative   Negative   100 mg/dL (2+)                 Microbiology Results (last 10 days)       ** No results found for the last 240 hours. **            CT Head Without Contrast    Result Date: 6/27/2024  CT HEAD WO CONTRAST Date of Exam: 6/27/2024 5:23 PM EDT Indication: severe headache, on hep gtt for dissection treatment. Comparison: 6/27/2024 and 6/26/2024 Technique: Axial CT images were  obtained of the head without contrast administration.  Automated exposure control and iterative construction methods were used. Findings: Gray-white matter differentiation is maintained without evidence of an acute infarction. No intracranial mass or mass effect. No extra-axial mass or collection. The ventricles and sulci are normal in size and configuration. Stable small right cerebellar infarct. Sellar and suprasellar structures are normal. Orbital and paranasal soft tissues are normal. The paranasal sinuses, ethmoid air cells, and mastoid air cells are aerated. The bony calvarium appears intact. No acute fractures. No lytic or blastic bony diseases.     Impression: Stable appearance of the small right cerebellar infarct. No new changes. Electronically Signed: Otoniel Pinzon MD  6/27/2024 5:45 PM EDT  Workstation ID: [id number]    Adult Transthoracic Echo Complete W/ Cont if Necessary Per Protocol (With Agitated Saline)    Result Date: 6/27/2024    Left ventricular systolic function is normal. Estimated left ventricular EF = 65% Left ventricular ejection fraction appears to be 61 - 65%.   The cardiac valves are anatomically and functionally normal.   Saline test results are positive with valsalva manuever.     CT Head Without Contrast    Result Date: 6/27/2024  CT HEAD WO CONTRAST Date of Exam: 6/27/2024 4:08 AM EDT Indication: stroke follow up. Comparison: CT brain and MR brain of 1 day prior.. Technique: Axial CT images were obtained of the head without contrast administration.  Automated exposure control and iterative construction methods were used. Findings: There is a low-density lesion of the right posterior cerebellar hemisphere which is new as compared to the prior CT and corresponds to an infarct seen on MR imaging. Other smaller infarcts of the right cerebellum seen on the MR exam are not evident on the CT brain. There is no hemorrhage. There is no acute mass effect. There is no midline shift. Ventricles are  normal in size and shape.     Impression: Subacute nonhemorrhagic right cerebellar hemisphere CVA. Other smaller CVAs seen by MR imaging are not evident on the CT brain exam. Electronically Signed: Pati Alicea MD  6/27/2024 7:55 AM EDT  Workstation ID: [id number]    MRI Brain Without Contrast    Result Date: 6/26/2024  MRI BRAIN WO CONTRAST Date of Exam: 6/26/2024 7:20 PM EDT Indication: weakness, poss stroke.  Comparison: 6/26/2024 Technique:  Routine multiplanar/multisequence sequence images of the brain were obtained without contrast administration. Findings: Multiple lacunar infarcts of the right cerebellar hemisphere with a prominent infarct also in the right cerebellar hemisphere. No definite abnormality identified by CT. Associated ADC signal dropout. Increased FLAIR/T2 signal changes are noted indicative of an infarct of at least 6 hours duration. No intracranial mass or hemorrhage. No extra-axial mass or collection. The ventricles and sulci are normal in size and configuration. Orbital and periorbital soft tissues are normal. The paranasal sinuses are clear. Fluid within the left mastoid air cells.     Multiple infarcts of the right cerebellar hemisphere predominantly lacunar. Electronically Signed: Otoniel Pinzon MD  6/26/2024 8:06 PM EDT  Workstation ID: [id number]    CT Head Without Contrast Stroke Protocol    Result Date: 6/26/2024  CT HEAD WO CONTRAST STROKE PROTOCOL Date of Exam: 6/26/2024 5:57 PM EDT Indication: eval for stroke. Comparison: CTA of the head and neck from earlier today, MRI of the brain with and without contrast dated 12/17/2021 Technique: Axial CT images were obtained of the head without contrast administration.  Reconstructed coronal images were also obtained. Automated exposure control and iterative construction methods were used. Scan Time: 1757 Results discussed with Poonam stroke navigator at 1814. Findings: There is no CT evidence of acute hemorrhage, infarct, mass, mass effect,  or midline shift. The gray-white matter differentiation is preserved. There is no hydrocephalus. The sulci and ventricles are symmetric.  No extraaxial fluid collections. The globes  and orbital contents are normal and symmetric. The mastoid air cells and visualized paranasal sinuses are clear. No skull fractures or suspicious calvarial lesions.     Impression: No acute intracranial abnormality. 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 Please refer to the CTA of the head and neck report from earlier today. Electronically Signed: James Montez DO  6/26/2024 6:15 PM EDT  Workstation ID: [id number]    CT Angiogram Head    Result Date: 6/26/2024  CT ANGIOGRAM NECK, CT ANGIOGRAM HEAD Date of Exam: 6/26/2024 4:55 PM EDT Indication: dizziness, post chiropractor manipulation. Comparison: None available. Technique: CTA of the head and neck was performed before and after the uneventful intravenous administration of Isovue-370 IV contrast . Reconstructed coronal and sagittal images were also obtained. In addition, a 3-D volume rendered image was created for interpretation. Automated exposure control and iterative reconstruction methods were used. FINDINGS: The arch of the aorta is normal. The common carotid arteries are normal. The extracranial internal carotid arteries are normal. The intracranial segments of the internal carotid arteries are normal. The carotid termini are normal. The visualized branches of the anterior and middle cerebral arteries appear normal. Right vertebral artery dissection extending from the C3 vertebral body level extending superiorly into the right C2 foramina transversaria. Cranial to this level the right vertebral artery is narrowed which extends to the foramen magnum through the foramina transversarium of C1 consistent with mural thrombus. Narrowing of the left vertebral artery is also  present extending from the level of C2 extending superiorly within the left foramina transversarium of C1 with normal caliber at the level of the  foramen magnum also suggesting left-sided vertebral artery dissection with mural thrombus. Both vertebral arteries supply the basilar artery. There appears to be an intracranial component of the vertebral artery dissection involving the basilar artery best seen on axial image #408 series 8. The visualized lung apices are clear. The airway is clear. The thyroid gland is normal. No cervical adenopathy. The infratemporal fossa is normal bilaterally. No intracranial mass or mass effect. No midline shift. No extra-axial mass or collection. Orbital and paranasal soft tissues are normal. The paranasal sinuses are clear. The mastoid air cells are aerated. Bilateral breast implants.     Bilateral extracranial vertebral artery dissections as above. The basilar artery likewise appears dissected. Findings discussed with Dr. Hunt at 5:22 p.m. on 6/26/2024 Electronically Signed: Otoniel Pinzon MD  6/26/2024 5:25 PM EDT  Workstation ID: [id number]    CT Angiogram Neck    Result Date: 6/26/2024  CT ANGIOGRAM NECK, CT ANGIOGRAM HEAD Date of Exam: 6/26/2024 4:55 PM EDT Indication: dizziness, post chiropractor manipulation. Comparison: None available. Technique: CTA of the head and neck was performed before and after the uneventful intravenous administration of Isovue-370 IV contrast . Reconstructed coronal and sagittal images were also obtained. In addition, a 3-D volume rendered image was created for interpretation. Automated exposure control and iterative reconstruction methods were used. FINDINGS: The arch of the aorta is normal. The common carotid arteries are normal. The extracranial internal carotid arteries are normal. The intracranial segments of the internal carotid arteries are normal. The carotid termini are normal. The visualized branches of the anterior and middle cerebral  arteries appear normal. Right vertebral artery dissection extending from the C3 vertebral body level extending superiorly into the right C2 foramina transversaria. Cranial to this level the right vertebral artery is narrowed which extends to the foramen magnum through the foramina transversarium of C1 consistent with mural thrombus. Narrowing of the left vertebral artery is also present extending from the level of C2 extending superiorly within the left foramina transversarium of C1 with normal caliber at the level of the  foramen magnum also suggesting left-sided vertebral artery dissection with mural thrombus. Both vertebral arteries supply the basilar artery. There appears to be an intracranial component of the vertebral artery dissection involving the basilar artery best seen on axial image #408 series 8. The visualized lung apices are clear. The airway is clear. The thyroid gland is normal. No cervical adenopathy. The infratemporal fossa is normal bilaterally. No intracranial mass or mass effect. No midline shift. No extra-axial mass or collection. Orbital and paranasal soft tissues are normal. The paranasal sinuses are clear. The mastoid air cells are aerated. Bilateral breast implants.     Bilateral extracranial vertebral artery dissections as above. The basilar artery likewise appears dissected. Findings discussed with Dr. Hunt at 5:22 p.m. on 6/26/2024 Electronically Signed: Otoniel Pinzon MD  6/26/2024 5:25 PM EDT  Workstation ID: [id number]    XR Chest 1 View    Result Date: 6/26/2024  XR CHEST 1 VW Date of Exam: 6/26/2024 4:28 PM EDT Indication: Weak/Dizzy/AMS triage protocol Comparison: Chest radiograph 11/22/2016 Findings: Heart shadow appears in the upper range of normal size and may be exaggerated by lower lung volumes on today's study. Vasculature appears normal. Slight coarsening of the basilar interstitial markings is thought to reflect overlying breast tissue shadow.  No lung consolidation,  effusion or pneumothorax is seen there appear to be a couple surgical clips superimposed over the right upper chest, left hilum and left lateral chest.     Impression: No evidence of active chest disease. Electronically Signed: Jason Nguyen MD  6/26/2024 4:38 PM EDT  Workstation ID: [id number]             Results for orders placed during the hospital encounter of 06/26/24    Adult Transthoracic Echo Complete W/ Cont if Necessary Per Protocol (With Agitated Saline)    Interpretation Summary    Left ventricular systolic function is normal. Estimated left ventricular EF = 65% Left ventricular ejection fraction appears to be 61 - 65%.    The cardiac valves are anatomically and functionally normal.    Saline test results are positive with valsalva manuever.      Plan for Follow-up of Pending Labs/Results:     Discharge Details        Discharge Medications        New Medications        Instructions Start Date   apixaban 5 MG tablet tablet  Commonly known as: ELIQUIS   5 mg, Oral, Every 12 Hours Scheduled      atorvastatin 40 MG tablet  Commonly known as: LIPITOR   40 mg, Oral, Nightly             Continue These Medications        Instructions Start Date   cyclobenzaprine 5 MG tablet  Commonly known as: FLEXERIL   5 mg, Oral, Nightly PRN      Hair Skin and Nails Formula tablet tablet  Generic drug: multivitamin with minerals   Oral, Daily      nortriptyline 25 MG capsule  Commonly known as: Pamelor   25 mg, Oral, Nightly      polyethylene glycol packet  Commonly known as: MIRALAX   17 g, Oral, As Needed      venlafaxine 75 MG tablet  Commonly known as: EFFEXOR   75 mg, Oral, 2 Times Daily             Stop These Medications      dexAMETHasone 1 MG tablet  Commonly known as: DECADRON     Erenumab-aooe 140 MG/ML auto-injector  Commonly known as: AIMOVIG     estradiol 0.1 MG/GM vaginal cream  Commonly known as: ESTRACE VAGINAL     SUMAtriptan 100 MG tablet  Commonly known as: IMITREX     Zavzpret 10 MG/ACT solution  Generic drug:  Zavegepant HCl              Allergies   Allergen Reactions    Penicillins GI Intolerance         Discharge Disposition:  Home or Self Care    Diet:  Hospital:  Diet Order   Procedures    Diet: Regular/House; Fluid Consistency: Thin (IDDSI 0)       Diet Instructions       Diet: Regular/House Diet; Regular (IDDSI 7); Thin (IDDSI 0)      Discharge Diet: Regular/House Diet    Texture: Regular (IDDSI 7)    Fluid Consistency: Thin (IDDSI 0)             Activity:  Activity Instructions       Activity as Tolerated              Restrictions or Other Recommendations:         CODE STATUS:    Code Status and Medical Interventions:   Ordered at: 06/27/24 0036     Code Status (Patient has no pulse and is not breathing):    CPR (Attempt to Resuscitate)     Medical Interventions (Patient has pulse or is breathing):    Full Support       Future Appointments   Date Time Provider Department Center   7/19/2024 10:45 AM Sara Rocha MD [facility]   11/12/2024 10:30 AM Enma Kwong APRN [facility]       Additional Instructions for the Follow-ups that You Need to Schedule       Discharge Follow-up with Specified Provider: Headache clinic; 1 Month   As directed      To: Headache clinic   Follow Up: 1 Month                      Vidya Johns MD  06/30/24      Time Spent on Discharge:  I spent  45  minutes on this discharge activity which included: face-to-face encounter with the patient, reviewing the data in the system, coordination of the care with the nursing staff as well as consultants, documentation, and entering orders.

## 2024-06-30 NOTE — OUTREACH NOTE
Prep Survey      Flowsheet Row Responses   [facility] facility patient discharged from? [facility]   Is LACE score < 7 ? No   Eligibility Readm Mgmt   Discharge diagnosis Dissection, vertebral artery   Does the patient have one of the following disease processes/diagnoses(primary or secondary)? Other   Does the patient have Home health ordered? No   Is there a DME ordered? No   Comments regarding appointments Discharge Follow-up with Specified Provider: Headache clinic,  1 Month   Medication alerts for this patient see AVS   Prep survey completed? Yes            Cynthia VARELA - Registered Nurse

## 2024-06-30 NOTE — PLAN OF CARE
Problem: Adult Inpatient Plan of Care  Goal: Plan of Care Review  Outcome: Ongoing, Progressing  Flowsheets (Taken 6/29/2024 1851 by Shila Mart RN)  Plan of Care Reviewed With: patient  Goal: Patient-Specific Goal (Individualized)  Outcome: Ongoing, Progressing  Goal: Absence of Hospital-Acquired Illness or Injury  Outcome: Ongoing, Progressing  Intervention: Identify and Manage Fall Risk  Recent Flowsheet Documentation  Taken 6/30/2024 0200 by Guerline Miranda RN  Safety Promotion/Fall Prevention:   activity supervised   assistive device/personal items within reach   clutter free environment maintained   fall prevention program maintained   nonskid shoes/slippers when out of bed   lighting adjusted   room organization consistent   safety round/check completed  Taken 6/30/2024 0000 by Guerline Miranda RN  Safety Promotion/Fall Prevention:   activity supervised   assistive device/personal items within reach   clutter free environment maintained   fall prevention program maintained   lighting adjusted   nonskid shoes/slippers when out of bed   room organization consistent   safety round/check completed  Taken 6/29/2024 2200 by Guerline Miranda RN  Safety Promotion/Fall Prevention:   activity supervised   clutter free environment maintained   assistive device/personal items within reach   fall prevention program maintained   lighting adjusted   nonskid shoes/slippers when out of bed   room organization consistent   safety round/check completed  Taken 6/29/2024 2046 by Guerline Miranda RN  Safety Promotion/Fall Prevention:   activity supervised   assistive device/personal items within reach   clutter free environment maintained   fall prevention program maintained   lighting adjusted   nonskid shoes/slippers when out of bed   safety round/check completed  Intervention: Prevent Skin Injury  Recent Flowsheet Documentation  Taken 6/30/2024 0200 by Guerline Miranda RN  Body Position: position changed  independently  Skin Protection:   adhesive use limited   incontinence pads utilized   tubing/devices free from skin contact  Taken 6/30/2024 0000 by Guerline Miranda RN  Body Position: position changed independently  Skin Protection:   adhesive use limited   incontinence pads utilized   tubing/devices free from skin contact  Taken 6/29/2024 2200 by Guerline Miranda RN  Body Position: position changed independently  Skin Protection:   adhesive use limited   incontinence pads utilized   tubing/devices free from skin contact  Taken 6/29/2024 2046 by Guerline Miranda RN  Body Position: position changed independently  Skin Protection:   adhesive use limited   incontinence pads utilized   tubing/devices free from skin contact  Intervention: Prevent and Manage VTE (Venous Thromboembolism) Risk  Recent Flowsheet Documentation  Taken 6/30/2024 0200 by Guerline Miranda RN  Activity Management: up ad enid  Taken 6/30/2024 0000 by Guerline Miranda RN  Activity Management: up ad enid  Taken 6/29/2024 2200 by Guerline Miranda RN  Activity Management: up ad enid  Taken 6/29/2024 2046 by Guerline Miranda RN  Activity Management: up ad enid  Range of Motion: active ROM (range of motion) encouraged  Intervention: Prevent Infection  Recent Flowsheet Documentation  Taken 6/30/2024 0200 by Guerline Miranda RN  Infection Prevention:   environmental surveillance performed   single patient room provided   rest/sleep promoted  Taken 6/30/2024 0000 by Guerline Miranda RN  Infection Prevention:   environmental surveillance performed   rest/sleep promoted   single patient room provided  Taken 6/29/2024 2200 by Guerline Miranda RN  Infection Prevention:   environmental surveillance performed   rest/sleep promoted   single patient room provided  Taken 6/29/2024 2046 by Guerline Miranda RN  Infection Prevention:   environmental surveillance performed   single patient room provided   rest/sleep promoted  Goal: Optimal Comfort and  Wellbeing  Outcome: Ongoing, Progressing  Intervention: Provide Person-Centered Care  Recent Flowsheet Documentation  Taken 6/29/2024 2046 by Guerline Miranda, RN  Trust Relationship/Rapport:   care explained   choices provided   questions encouraged   thoughts/feelings acknowledged   questions answered  Goal: Readiness for Transition of Care  Outcome: Ongoing, Progressing   Goal Outcome Evaluation:                                   PT VSS, RA, NSR on monitor.  at bedside. Pt states that she is filling much better. Will continue with POC

## 2024-06-30 NOTE — PROGRESS NOTES
Stroke Neurology Progress Note     Subjective     This patient was seen in follow-up for: Multifocal dissections in bilateral vertebral arteries and basilar artery  Present for the encounter were: self, patient, patient's     Subjective:  Patient reports improved 5/10 headache, feels can manage at home.  Patient tolerated apixaban well overnight.  Physical exam continues to be normal.  Discussed results of TTE (PFO).  Patient prefers outpatient lower extremity ultrasound as will not .    Objective      Temp:  [97.5 °F (36.4 °C)-98.7 °F (37.1 °C)] 97.5 °F (36.4 °C)  Heart Rate:  [] 76  Resp:  [16-18] 16  BP: (130-147)/() 134/84            Objective    Physical Exam:  General Appearance: Alert  HEENT: anicteric sclera, no scleral injection  Lungs: respirations appear comfortable, no obvious increased work of breathing  Extremities: No cyanosis or fingernail clubbing   Skin: No rashes in exposed skin areas     Neurological Examination:   Mental status: Alert and oriented. No dysarthria. Able to name and repeat.  Cranial Nerves: Visual fields intact. Extraocular movements intact with no nystagmus. Midline gaze. Face symmetric.    Sensory: Normal sensory exam to light touch.  Motor: Normal tone. Absent pronator drift.  Strength:  LUE: 5/5 biceps, triceps,   LLE: 5/5 hip flexion/extension  RUE: 5/5 biceps, triceps,   RLE:  5/5 hip flexion/extension  Cerebellar: Finger-to-nose intact. Heel-to-shin intact. Rapid alternating movements are intact.   Gait: Not assessed.     Labs:    Lab Results   Component Value Date    HGBA1C 5.20 06/27/2024      Lab Results   Component Value Date    CHOL 186 06/27/2024    TRIG 282 (H) 06/27/2024    HDL 63 (H) 06/27/2024    LDL 78 06/27/2024       Lab Results   Component Value Date    WBC 8.05 06/29/2024    HGB 15.8 06/29/2024    HCT 46.8 (H) 06/29/2024    MCV 93.2 06/29/2024     06/29/2024     Lab Results   Component Value Date    GLUCOSE  "111 (H) 06/29/2024    BUN 8 06/29/2024    CREATININE 0.76 06/29/2024    EGFRIFNONA 78 12/23/2019    BCR 10.5 06/29/2024    CO2 21.0 (L) 06/29/2024    CALCIUM 8.9 06/29/2024    ALBUMIN 3.6 06/27/2024    AST 15 06/27/2024    ALT 23 06/27/2024       Results from last 7 days   Lab Units 06/29/24  1816 06/29/24  0649 06/28/24  1803 06/28/24  0753 06/28/24  0343 06/27/24  2023 06/27/24  0520 06/26/24  1631   SODIUM mmol/L 138 135* 135*   < > 137   < > 136 139  138   POTASSIUM mmol/L  --  4.6  --   --  4.1  --  4.1 3.8   CHLORIDE mmol/L  --  98  --   --  99  --  101 100   CO2 mmol/L  --  21.0*  --   --  27.0  --  27.0 27.0   BUN mg/dL  --  8  --   --  9  --  9 13   CREATININE mg/dL  --  0.76  --   --  0.87  --  0.66 0.88   CALCIUM mg/dL  --  8.9  --   --  9.0  --  8.4* 9.3   BILIRUBIN mg/dL  --   --   --   --   --   --  0.5 0.5   ALK PHOS U/L  --   --   --   --   --   --  74 90   ALT (SGPT) U/L  --   --   --   --   --   --  23 29   AST (SGOT) U/L  --   --   --   --   --   --  15 18   GLUCOSE mg/dL  --  111*  --   --  101*  --  97 120*    < > = values in this interval not displayed.       No results found for: \"BLOODCX\"  UA          11/8/2023    11:19 6/26/2024    17:01   Urinalysis   Squamous Epithelial Cells, UA 0-2  0-2    Specific Gravity, UA <=1.005  1.017    Ketones, UA Negative  Negative    Blood, UA Negative  Negative    Leukocytes, UA Small (1+)  Negative    Nitrite, UA Negative  Negative    RBC, UA 0-2  0-2    WBC, UA 3-5  0-2    Bacteria, UA None Seen  None Seen      Lab Results   Component Value Date    URINECX No growth 11/08/2023       Results Review:      All brain images and reports were personally reviewed and I agree with the interpretations except as noted below.      MRI Brain Without Contrast 6/26/2024  Impression: Multiple infarcts of the right cerebellar hemisphere predominantly lacunar.     CT Head Without Contrast Stroke Protocol 6/26/2024  Impression: No acute intracranial abnormality.     CT " Angiogram Head and Neck 6/26/2024  Impression: Bilateral extracranial vertebral artery dissections as above. The basilar artery likewise appears dissected.     Transthoracic echocardiogram 6/27/2024  Impression:    Left ventricular systolic function is normal. Estimated left ventricular EF = 65% Left ventricular ejection fraction appears to be 61 - 65%.    The cardiac valves are anatomically and functionally normal.    Saline test results are positive with valsalva manuever.            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 Anticipate duration at least 6 months to 1 year with at least antiplatelet monotherapy lifelong thereafter.    -Continue atorvastatin 40 mg daily  -SBP goals normotensive  -DVT prophylaxis SCD  -PT OT recommend home  -Advised dissection precautions including avoidance of cervical manipulation, cervical trauma, sports injuries, or whiplash movements  -Patient educated on risk of recurrent stroke with multiple dissections despite maximal medical management.  Low threshold to return to ED with any change in neurologic status for repeat imaging.    # Acute on chronic headaches    -I advised that headaches will be expected over the next few months while dissections heal  -Can discharge with Medrol Dosepak and Compazine  -If intractable headaches at home, patient should return for migraine cocktail  -Avoid NSAIDs to reduce bleed risk  -Patient follows in headache clinic  -Patient due for Botox July 19  -Recommend outpatient nerve block next available  -If headaches continue to be intractable, we can provide patient with work excuse while dissections heal. Also expected with posterior circulation stroke.     # Patent foramen ovale  -Bilateral lower extremity ultrasound deferred to outpatient as will not   -Recommend Cardiology evaluation as  outpatient; PFO not mechanism of infarct   -Continue apixaban 5 mg BID      Patient cleared to discharge home from stroke neurology perspective.  Please call with questions.     Akua Franklin MD  [facility] [place] [facility]  06/30/24  06:25 EDT

## 2024-07-01 ENCOUNTER — TELEPHONE (OUTPATIENT)
Dept: NEUROLOGY | Facility: CLINIC | Age: 44
End: 2024-07-01
Payer: COMMERCIAL

## 2024-07-01 NOTE — TELEPHONE ENCOUNTER
SW PATIENT TO SEE IF SHE HAD BEEN PLANNING ON CONTACTING DR COSTA'S OFFICE TO SCHEDULE NEXT AVAILABLE F/U APPT. SHE STATED SHE WILL CALL THE OFFICE TODAY.    PT NEEDS NEXT AVAILABLE WITH [facility] OR OTHER PROVIDER IN CLINIC PER DR RADHA TORRES.

## 2024-07-02 NOTE — PAYOR COMM NOTE
"Auth# [id number]   24 Discharge Date  Discharge summary    Utilization Review  Phone [phone]  Fax [phone]      [address] [place] [address]       Susana Hernandez (43 y.o. Female)       Date of Birth   1980    Social Security Number       Address   [address] [facility] [address]    Home Phone   [phone]    MRN   [id number]       [employer]    Marital Status                               Admission Date   24    Admission Type   Emergency    Admitting Provider   Vidya Johns MD    Attending Provider       Department, Room/Bed   [facility] 3E, S330/1       Discharge Date   2024    Discharge Disposition   Home or Self Care    Discharge Destination                                 Attending Provider: (none)   Allergies: Penicillins    Isolation: None   Infection: None   Code Status: Prior    Ht: 167.6 cm (66\")   Wt: 91.3 kg (201 lb 4.5 oz)    Admission Cmt: None   Principal Problem: Dissection, 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]                   Active Insurance as of 2024       Primary Coverage       Payor Plan Insurance Group Employer/Plan Group    [employer] BLUE [employer]OSS [employer] BLUE [employer]OSS BLUE [employer]IELD PPO [id number]       Payor Plan Address Payor Plan Phone Number Payor Plan Fax Number Effective Dates    [address] [phone]  2023 - None Entered    [facility] [id number]         Subscriber Name Subscriber Birth Date Member ID       SUSANA HERNANDEZ 1980 [id number]                     Emergency Contacts        (Rel.) Home Phone Work Phone Mobile Phone    ARTHUR HERNANDEZ (Spouse) [phone] -- [phone]                 Discharge Summary        Vidya Johns MD at 24 [facility]              [facility]  DISCHARGE SUMMARY    Patient Name: Susana Hernandez  : 1980  MRN: [id number]    Date of Admission: 2024  4:20 PM  Date of " Discharge:  6/30/24  Primary Care Physician: José Luis Redman MD    Consults       Date and Time Order Name Status Description    6/26/2024  5:49 PM Inpatient Neurosurgery Consult Completed             Hospital Course     Presenting Problem:     Active Hospital Problems    Diagnosis  POA    **Dissection, 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          Hospital Course:  Edna Motley is a 43 y.o. female with history of Massey syndrome, breast cancer and chronic migraines who presented to the ED from the chiropractor's office after developing acute onset dizziness, nausea, vomiting and diuresis.  On presentation she had neck pain bilaterally.  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  She was admitted to the ICU.  Neuro neurology and neurosurgery were consulted and recommended blood pressure control.  She was started on a heparin drip.  TTE was performed and showed a PFO.  She was transition from the heparin drip to Eliquis.  She was also started on Eliquis.  Transferred out of the ICU on 6/29/2024.    During admission patient developed significant headaches.  Neurology followed.  She received valproic acid, Benadryl, prochlorperazine, magnesium sulfate, prednisone, Tylenol and IV fluids for headache.  Patient is through avoid NSAIDs due to bleeding risk.  She will follow-up with the headache clinic.  She also has a follow-up scheduled with Dr. Rocha on 7/19/2024. Discussed plan with Dr. Franklin prior to discharge. Her imitrex was discontinued due to increased stroke risk.     Patient was started on Eliquis during admission.  Neurology recommends continuing this for at least 6 months to 1 year with at least antiplatelet monotherapy lifelong thereafter.    At discharge patient is doing well, she has no neurological deficits.  She continues to have posterior headache  pain.      Discharge Follow Up Recommendations for outpatient labs/diagnostics:  Follow-up with Dr. Rocha on 7/19/2024    Day of Discharge     HPI:   Patient is doing well this morning and wants to go home. Still having a basilar headache.   Vital Signs:   Temp:  [97.5 °F (36.4 °C)-98.7 °F (37.1 °C)] 97.6 °F (36.4 °C)  Heart Rate:  [] 88  Resp:  [16-18] 18  BP: (114-146)/() 114/76      Physical Exam:  Constitutional: No acute distress, awake, alert, sitting on couch  Respiratory: Clear to auscultation bilaterally, respiratory effort normal   Cardiovascular: RRR, no murmurs, rubs, or gallops  Gastrointestinal: Positive bowel sounds, soft, nontender, nondistended  Musculoskeletal: No bilateral ankle edema  Psychiatric: Appropriate affect, cooperative  Neurologic: Oriented x 3, no focal neurological deficits      Pertinent  and/or Most Recent Results     LAB RESULTS:      Lab 06/29/24  0649 06/28/24  0753 06/28/24  0439 06/28/24  0330 06/27/24  1930 06/27/24  1209 06/27/24  0520 06/27/24  0017 06/26/24  1631   WBC 8.05  --  6.68  --   --   --  8.61  --  10.63   HEMOGLOBIN 15.8  --  15.7  --   --   --  13.9  --  15.9   HEMATOCRIT 46.8*  --  47.1*  --   --   --  40.1  --  46.5   PLATELETS 241  --  213  --   --   --  227  --  254   NEUTROS ABS  --   --   --   --   --   --  5.86  --  7.31*   IMMATURE GRANS (ABS)  --   --   --   --   --   --  0.04  --  0.08*   LYMPHS ABS  --   --   --   --   --   --  2.15  --  2.45   MONOS ABS  --   --   --   --   --   --  0.52  --  0.73   EOS ABS  --   --   --   --   --   --  0.02  --  0.03   MCV 93.2  --  94.2  --   --   --  89.1  --  90.8   PROTIME  --   --   --   --   --   --   --   --  12.9   APTT  --   --   --   --   --   --   --   --  22.2*   HEPARIN ANTI-XA 0.23* 0.36  --  0.36 0.27* 0.21* 0.96*   < > 0.10*    < > = values in this interval not displayed.         Lab 06/30/24  0858 06/29/24  1816 06/29/24  0649 06/28/24  1803 06/28/24  0753 06/28/24  0343 06/27/24 2023  06/27/24  0520 06/26/24  1631   SODIUM 135* 138 135* 135* 138 137   < > 136 139  138   POTASSIUM  --   --  4.6  --   --  4.1  --  4.1 3.8   CHLORIDE  --   --  98  --   --  99  --  101 100   CO2  --   --  21.0*  --   --  27.0  --  27.0 27.0   ANION GAP  --   --  16.0*  --   --  11.0  --  8.0 11.0   BUN  --   --  8  --   --  9  --  9 13   CREATININE  --   --  0.76  --   --  0.87  --  0.66 0.88   EGFR  --   --  99.9  --   --  84.9  --  111.8 83.7   GLUCOSE  --   --  111*  --   --  101*  --  97 120*   CALCIUM  --   --  8.9  --   --  9.0  --  8.4* 9.3   MAGNESIUM  --   --  3.1*  --   --  2.5  --  2.6 2.6   PHOSPHORUS  --   --  3.9  --   --  3.9  --  4.1  --    HEMOGLOBIN A1C  --   --   --   --   --   --   --  5.20  --    TSH  --   --   --   --   --   --   --   --  1.510    < > = values in this interval not displayed.         Lab 06/27/24  0520 06/26/24  1631   TOTAL PROTEIN 6.3 7.5   ALBUMIN 3.6 4.1   GLOBULIN 2.7 3.4   ALT (SGPT) 23 29   AST (SGOT) 15 18   BILIRUBIN 0.5 0.5   ALK PHOS 74 90         Lab 06/26/24  1631   HSTROP T <6   PROTIME 12.9   INR 0.96         Lab 06/27/24  0520   CHOLESTEROL 186   LDL CHOL 78   HDL CHOL 63*   TRIGLYCERIDES 282*             Brief Urine Lab Results  (Last result in the past 365 days)        Color   Clarity   Blood   Leuk Est   Nitrite   Protein   CREAT   Urine HCG        06/26/24 1701 Yellow   Clear   Negative   Negative   Negative   100 mg/dL (2+)                 Microbiology Results (last 10 days)       ** No results found for the last 240 hours. **            CT Head Without Contrast    Result Date: 6/27/2024  CT HEAD WO CONTRAST Date of Exam: 6/27/2024 5:23 PM EDT Indication: severe headache, on hep gtt for dissection treatment. Comparison: 6/27/2024 and 6/26/2024 Technique: Axial CT images were obtained of the head without contrast administration.  Automated exposure control and iterative construction methods were used. Findings: Gray-white matter differentiation is maintained  without evidence of an acute infarction. No intracranial mass or mass effect. No extra-axial mass or collection. The ventricles and sulci are normal in size and configuration. Stable small right cerebellar infarct. Sellar and suprasellar structures are normal. Orbital and paranasal soft tissues are normal. The paranasal sinuses, ethmoid air cells, and mastoid air cells are aerated. The bony calvarium appears intact. No acute fractures. No lytic or blastic bony diseases.     Impression: Stable appearance of the small right cerebellar infarct. No new changes. Electronically Signed: Otoniel Pinzon MD  6/27/2024 5:45 PM EDT  Workstation ID: [id number]    Adult Transthoracic Echo Complete W/ Cont if Necessary Per Protocol (With Agitated Saline)    Result Date: 6/27/2024    Left ventricular systolic function is normal. Estimated left ventricular EF = 65% Left ventricular ejection fraction appears to be 61 - 65%.   The cardiac valves are anatomically and functionally normal.   Saline test results are positive with valsalva manuever.     CT Head Without Contrast    Result Date: 6/27/2024  CT HEAD WO CONTRAST Date of Exam: 6/27/2024 4:08 AM EDT Indication: stroke follow up. Comparison: CT brain and MR brain of 1 day prior.. Technique: Axial CT images were obtained of the head without contrast administration.  Automated exposure control and iterative construction methods were used. Findings: There is a low-density lesion of the right posterior cerebellar hemisphere which is new as compared to the prior CT and corresponds to an infarct seen on MR imaging. Other smaller infarcts of the right cerebellum seen on the MR exam are not evident on the CT brain. There is no hemorrhage. There is no acute mass effect. There is no midline shift. Ventricles are normal in size and shape.     Impression: Subacute nonhemorrhagic right cerebellar hemisphere CVA. Other smaller CVAs seen by MR imaging are not evident on the CT brain exam.  Electronically Signed: Pati Alicea MD  6/27/2024 7:55 AM EDT  Workstation ID: [id number]    MRI Brain Without Contrast    Result Date: 6/26/2024  MRI BRAIN WO CONTRAST Date of Exam: 6/26/2024 7:20 PM EDT Indication: weakness, poss stroke.  Comparison: 6/26/2024 Technique:  Routine multiplanar/multisequence sequence images of the brain were obtained without contrast administration. Findings: Multiple lacunar infarcts of the right cerebellar hemisphere with a prominent infarct also in the right cerebellar hemisphere. No definite abnormality identified by CT. Associated ADC signal dropout. Increased FLAIR/T2 signal changes are noted indicative of an infarct of at least 6 hours duration. No intracranial mass or hemorrhage. No extra-axial mass or collection. The ventricles and sulci are normal in size and configuration. Orbital and periorbital soft tissues are normal. The paranasal sinuses are clear. Fluid within the left mastoid air cells.     Multiple infarcts of the right cerebellar hemisphere predominantly lacunar. Electronically Signed: Otoniel Pinzon MD  6/26/2024 8:06 PM EDT  Workstation ID: [id number]    CT Head Without Contrast Stroke Protocol    Result Date: 6/26/2024  CT HEAD WO CONTRAST STROKE PROTOCOL Date of Exam: 6/26/2024 5:57 PM EDT Indication: eval for stroke. Comparison: CTA of the head and neck from earlier today, MRI of the brain with and without contrast dated 12/17/2021 Technique: Axial CT images were obtained of the head without contrast administration.  Reconstructed coronal images were also obtained. Automated exposure control and iterative construction methods were used. Scan Time: 1757 Results discussed with Poonam stroke navigator at 1814. Findings: There is no CT evidence of acute hemorrhage, infarct, mass, mass effect, or midline shift. The gray-white matter differentiation is preserved. There is no hydrocephalus. The sulci and ventricles are symmetric.  No extraaxial fluid collections. The  globes  and orbital contents are normal and symmetric. The mastoid air cells and visualized paranasal sinuses are clear. No skull fractures or suspicious calvarial lesions.     Impression: No acute intracranial abnormality. 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 Please refer to the CTA of the head and neck report from earlier today. Electronically Signed: Jamesjuan Montez DO  6/26/2024 6:15 PM EDT  Workstation ID: [id number]    CT Angiogram Head    Result Date: 6/26/2024  CT ANGIOGRAM NECK, CT ANGIOGRAM HEAD Date of Exam: 6/26/2024 4:55 PM EDT Indication: dizziness, post chiropractor manipulation. Comparison: None available. Technique: CTA of the head and neck was performed before and after the uneventful intravenous administration of Isovue-370 IV contrast . Reconstructed coronal and sagittal images were also obtained. In addition, a 3-D volume rendered image was created for interpretation. Automated exposure control and iterative reconstruction methods were used. FINDINGS: The arch of the aorta is normal. The common carotid arteries are normal. The extracranial internal carotid arteries are normal. The intracranial segments of the internal carotid arteries are normal. The carotid termini are normal. The visualized branches of the anterior and middle cerebral arteries appear normal. Right vertebral artery dissection extending from the C3 vertebral body level extending superiorly into the right C2 foramina transversaria. Cranial to this level the right vertebral artery is narrowed which extends to the foramen magnum through the foramina transversarium of C1 consistent with mural thrombus. Narrowing of the left vertebral artery is also present extending from the level of C2 extending superiorly within the left foramina transversarium of C1 with normal caliber at the level of the  foramen magnum also suggesting  left-sided vertebral artery dissection with mural thrombus. Both vertebral arteries supply the basilar artery. There appears to be an intracranial component of the vertebral artery dissection involving the basilar artery best seen on axial image #408 series 8. The visualized lung apices are clear. The airway is clear. The thyroid gland is normal. No cervical adenopathy. The infratemporal fossa is normal bilaterally. No intracranial mass or mass effect. No midline shift. No extra-axial mass or collection. Orbital and paranasal soft tissues are normal. The paranasal sinuses are clear. The mastoid air cells are aerated. Bilateral breast implants.     Bilateral extracranial vertebral artery dissections as above. The basilar artery likewise appears dissected. Findings discussed with Dr. Hunt at 5:22 p.m. on 6/26/2024 Electronically Signed: Otoniel Pinzon MD  6/26/2024 5:25 PM EDT  Workstation ID: [id number]    CT Angiogram Neck    Result Date: 6/26/2024  CT ANGIOGRAM NECK, CT ANGIOGRAM HEAD Date of Exam: 6/26/2024 4:55 PM EDT Indication: dizziness, post chiropractor manipulation. Comparison: None available. Technique: CTA of the head and neck was performed before and after the uneventful intravenous administration of Isovue-370 IV contrast . Reconstructed coronal and sagittal images were also obtained. In addition, a 3-D volume rendered image was created for interpretation. Automated exposure control and iterative reconstruction methods were used. FINDINGS: The arch of the aorta is normal. The common carotid arteries are normal. The extracranial internal carotid arteries are normal. The intracranial segments of the internal carotid arteries are normal. The carotid termini are normal. The visualized branches of the anterior and middle cerebral arteries appear normal. Right vertebral artery dissection extending from the C3 vertebral body level extending superiorly into the right C2 foramina transversaria. Cranial to this  level the right vertebral artery is narrowed which extends to the foramen magnum through the foramina transversarium of C1 consistent with mural thrombus. Narrowing of the left vertebral artery is also present extending from the level of C2 extending superiorly within the left foramina transversarium of C1 with normal caliber at the level of the  foramen magnum also suggesting left-sided vertebral artery dissection with mural thrombus. Both vertebral arteries supply the basilar artery. There appears to be an intracranial component of the vertebral artery dissection involving the basilar artery best seen on axial image #408 series 8. The visualized lung apices are clear. The airway is clear. The thyroid gland is normal. No cervical adenopathy. The infratemporal fossa is normal bilaterally. No intracranial mass or mass effect. No midline shift. No extra-axial mass or collection. Orbital and paranasal soft tissues are normal. The paranasal sinuses are clear. The mastoid air cells are aerated. Bilateral breast implants.     Bilateral extracranial vertebral artery dissections as above. The basilar artery likewise appears dissected. Findings discussed with Dr. Hunt at 5:22 p.m. on 6/26/2024 Electronically Signed: Otoniel Pinzon MD  6/26/2024 5:25 PM EDT  Workstation ID: [id number]    XR Chest 1 View    Result Date: 6/26/2024  XR CHEST 1 VW Date of Exam: 6/26/2024 4:28 PM EDT Indication: Weak/Dizzy/AMS triage protocol Comparison: Chest radiograph 11/22/2016 Findings: Heart shadow appears in the upper range of normal size and may be exaggerated by lower lung volumes on today's study. Vasculature appears normal. Slight coarsening of the basilar interstitial markings is thought to reflect overlying breast tissue shadow.  No lung consolidation, effusion or pneumothorax is seen there appear to be a couple surgical clips superimposed over the right upper chest, left hilum and left lateral chest.     Impression: No evidence of  active chest disease. Electronically Signed: Jason Nguyen MD  6/26/2024 4:38 PM EDT  Workstation ID: [id number]             Results for orders placed during the hospital encounter of 06/26/24    Adult Transthoracic Echo Complete W/ Cont if Necessary Per Protocol (With Agitated Saline)    Interpretation Summary    Left ventricular systolic function is normal. Estimated left ventricular EF = 65% Left ventricular ejection fraction appears to be 61 - 65%.    The cardiac valves are anatomically and functionally normal.    Saline test results are positive with valsalva manuever.      Plan for Follow-up of Pending Labs/Results:     Discharge Details        Discharge Medications        New Medications        Instructions Start Date   apixaban 5 MG tablet tablet  Commonly known as: ELIQUIS   5 mg, Oral, Every 12 Hours Scheduled      atorvastatin 40 MG tablet  Commonly known as: LIPITOR   40 mg, Oral, Nightly             Continue These Medications        Instructions Start Date   cyclobenzaprine 5 MG tablet  Commonly known as: FLEXERIL   5 mg, Oral, Nightly PRN      Hair Skin and Nails Formula tablet tablet  Generic drug: multivitamin with minerals   Oral, Daily      nortriptyline 25 MG capsule  Commonly known as: Pamelor   25 mg, Oral, Nightly      polyethylene glycol packet  Commonly known as: MIRALAX   17 g, Oral, As Needed      venlafaxine 75 MG tablet  Commonly known as: EFFEXOR   75 mg, Oral, 2 Times Daily             Stop These Medications      dexAMETHasone 1 MG tablet  Commonly known as: DECADRON     Erenumab-aooe 140 MG/ML auto-injector  Commonly known as: AIMOVIG     estradiol 0.1 MG/GM vaginal cream  Commonly known as: ESTRACE VAGINAL     SUMAtriptan 100 MG tablet  Commonly known as: IMITREX     Zavzpret 10 MG/ACT solution  Generic drug: Zavegepant HCl              Allergies   Allergen Reactions    Penicillins GI Intolerance         Discharge Disposition:  Home or Self Care    Diet:  Hospital:  Diet Order   Procedures     Diet: Regular/House; Fluid Consistency: Thin (IDDSI 0)       Diet Instructions       Diet: Regular/House Diet; Regular (IDDSI 7); Thin (IDDSI 0)      Discharge Diet: Regular/House Diet    Texture: Regular (IDDSI 7)    Fluid Consistency: Thin (IDDSI 0)             Activity:  Activity Instructions       Activity as Tolerated              Restrictions or Other Recommendations:         CODE STATUS:    Code Status and Medical Interventions:   Ordered at: 06/27/24 0036     Code Status (Patient has no pulse and is not breathing):    CPR (Attempt to Resuscitate)     Medical Interventions (Patient has pulse or is breathing):    Full Support       Future Appointments   Date Time Provider Department Center   7/19/2024 10:45 AM Sara Rocha MD [facility]   11/12/2024 10:30 AM Enma Kwong APRN [facility]       Additional Instructions for the Follow-ups that You Need to Schedule       Discharge Follow-up with Specified Provider: Headache clinic; 1 Month   As directed      To: Headache clinic   Follow Up: 1 Month                      Jayne Sahu MD  06/30/24      Time Spent on Discharge:  I spent  45  minutes on this discharge activity which included: face-to-face encounter with the patient, reviewing the data in the system, coordination of the care with the nursing staff as well as consultants, documentation, and entering orders.            Electronically signed by Jayne Sahu MD at 06/30/24 1031       Discharge Order (From admission, onward)       Start     Ordered    06/30/24 0950  Discharge patient  Once        Expected Discharge Date: 06/30/24   Expected Discharge Time: Midday   Discharge Disposition: Home or Self Care   Physician of Record for Attribution - Please select from Treatment Team: JAYNE SAHU [[id number]]   Review needed by CMO to determine Physician of Record: No      Question Answer Comment   Physician of Record for Attribution - Please select from Treatment Team  JAYNE SAHU    Review needed by CMO to determine Physician of Record No        06/30/24 0971

## 2024-07-03 ENCOUNTER — SPECIALTY PHARMACY (OUTPATIENT)
Dept: NEUROLOGY | Facility: CLINIC | Age: 44
End: 2024-07-03
Payer: COMMERCIAL

## 2024-07-03 RX ORDER — RIMEGEPANT SULFATE 75 MG/75MG
75 TABLET, ORALLY DISINTEGRATING ORAL DAILY PRN
Qty: 16 TABLET | Refills: 11 | Status: SHIPPED | OUTPATIENT
Start: 2024-07-03

## 2024-07-03 NOTE — PROGRESS NOTES
"Enter Query Response Below      Query Response:     Right cerebellar infarct ruled in   Electronically signed by Vidya Johns MD, 24, 5:19 PM EDT.               If applicable, please update the problem list.     Patient: Edna Motley        : 1980  Account: [id number]           Admit Date: 2024        How to Respond to this query:       a. Click New Note     b. Answer query within the yellow box.                c. Update the Problem List, if applicable.      If you have any questions about this query contact me at: [email or web address]     Dr. Johns,    Discharge summary documents patient presented with neck pain, dizziness, nausea, vomiting and diuresis and \"CTA showed bilateral extracranial vertebral artery and basilar artery dissections.\"  Neurology note  documents right cerebellar infarct secondary to bilateral vertebral and basilar artery dissection.   MRI () with \"Multiple infarcts of the right cerebellar hemisphere predominantly lacunar.\"  Attending physician notes do not confirm infarct diagnosis.    Please clarify the following:    Right cerebellar infarct ruled in  Right cerebellar infarct ruled out  Other- specify______  Unable to determine    By submitting this query, we are merely seeking further clarification of documentation to accurately reflect all conditions that you are monitoring, evaluating, treating or that extend the hospitalization or utilize additional resources of care. Please utilize your independent clinical judgment when addressing the question(s) above.     This query and your response, once completed, will be entered into the legal medical record.    Sincerely,  Sherrie GUERRERO, RN  Clinical Documentation Integrity Program   [email or web address]  "

## 2024-07-08 ENCOUNTER — TELEPHONE (OUTPATIENT)
Dept: NEUROLOGY | Facility: CLINIC | Age: 44
End: 2024-07-08
Payer: COMMERCIAL

## 2024-07-08 NOTE — TELEPHONE ENCOUNTER
was transferred to the office. Patient states she had a stroke 1-1 1/2 weeks ago. She states her headache is only on half of her head. The patient states,  added Nurtec due to chronic migraines and  is having Patient take Tylenol and benadryl to help with pain. Neither are working at this time for the patient. I advised the patient that she should go to the ED to ge checked. She verbalized understanding.

## 2024-07-08 NOTE — TELEPHONE ENCOUNTER
Caller: SUSANA    Relationship to patient: SELF    Best call back number: [phone]    Chief complaint: HEADACHE AND PAIN    Patient directed to call 911 or go to their nearest emergency room.     Patient verbalized understanding: [x] Yes  [] No  If no, why?    Additional notes:  PT CALLED TO SPEAK W/RADHA TORRES BECAUSE CONCERN OF MINI STROKES. TOLD PT TO GO TO ED BUT PT SAID SHE WASN'T SURE SHE WAS HAVING MINIS STROKES OR NOT. SHE JUST WANTED TO MAKE SURE SHE IF HER SYMPTOMS WERE THAT OF A STROKE STROKE.    WARM TRANSFERRED PT TO [facility] BECAUSE PT WAS CONCERNED SHE MIGHT BE HAVING MINI STROKES.

## 2024-07-11 ENCOUNTER — OFFICE VISIT (OUTPATIENT)
Dept: NEUROLOGY | Facility: CLINIC | Age: 44
End: 2024-07-11
Payer: COMMERCIAL

## 2024-07-11 ENCOUNTER — READMISSION MANAGEMENT (OUTPATIENT)
Dept: CALL CENTER | Facility: HOSPITAL | Age: 44
End: 2024-07-11
Payer: COMMERCIAL

## 2024-07-11 VITALS
HEART RATE: 87 BPM | WEIGHT: 201 LBS | OXYGEN SATURATION: 97 % | BODY MASS INDEX: 32.3 KG/M2 | DIASTOLIC BLOOD PRESSURE: 100 MMHG | HEIGHT: 66 IN | SYSTOLIC BLOOD PRESSURE: 138 MMHG

## 2024-07-11 DIAGNOSIS — I69.30 SEQUELAE, POST-STROKE: ICD-10-CM

## 2024-07-11 DIAGNOSIS — G43.019 INTRACTABLE MIGRAINE WITHOUT AURA AND WITHOUT STATUS MIGRAINOSUS: Primary | ICD-10-CM

## 2024-07-11 RX ORDER — NORTRIPTYLINE HYDROCHLORIDE 50 MG/1
50 CAPSULE ORAL NIGHTLY
Qty: 30 CAPSULE | Refills: 5 | Status: SHIPPED | OUTPATIENT
Start: 2024-07-11 | End: 2025-07-11

## 2024-07-11 NOTE — OUTREACH NOTE
Medical Week 2 Survey      Flowsheet Row Responses   [facility] patient discharged from? [facility]   Does the patient have one of the following disease processes/diagnoses(primary or secondary)? Other   Week 2 attempt successful? No   Unsuccessful attempts Attempt 1  [All numbers listed were attempted-no answer]            Radha H - Registered Nurse

## 2024-07-11 NOTE — PROGRESS NOTES
Subjective:    CC: Edna Motley is seen today for new complaint of Stroke       HPI:  Current visit-patient states she has been having a headache with neck pain for which she was on a Medrol Dosepak.  On 6/26 she saw her chiropractor and while going through that she heard a pop.  Soon after that she started to get dizzy and nauseous.  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  She also had a MRI brain that showed acute infarcts in the right cerebellar region.  She was discharged home on Eliquis and Lipitor 40 mg daily.  Last lipid panel was as follows-,  and LDL of 78.  She previously had fatty liver that has since resolved.  Her last A1c was 5.2.  She also had an echocardiogram that showed a normal EF with a PFO.  With regards to her headaches she states that she has been having a daily headache mainly at the back of her head since the stroke.  Her blood pressure has also been staying up.  She sleeps poorly despite taking nortriptyline.  Did try Nurtec but it did not help at all.  Also the first round of Botox done in April has not helped much.  Of note-I personally reviewed her MRI brain and her hospital neurology notes.    Last visit-at her last visit I had given her a sample of Trudhesa for her month-long headache which helped.  Her headaches were well controlled up until a week ago as she has been sick with a sore throat.  Was  also unable to get her Aimovig shot due to insurance issues and is 1 week late.  Is compliant with nortriptyline 25 mg nightly which has improved her sleep.  She still takes 7 to 8 tablets of Imitrex a month.  Some of her headaches start off with neck pain but overall her myalgias have improved since she got chemo several years ago.      Last visit-patient states that she was doing extremely well in terms of her headaches up until a few weeks ago when she started to  have a right-sided headache with pain behind her right eye.  Since then she has had a continuous headache despite taking sumatriptan.  She continues to take Aimovig shots monthly and nortriptyline 25 mg nightly.  Usually sleeps well at night except when she has hot flashes.     Last visit-patient started taking Aimovig 140 mg subcu q. monthly (which was improved however her co-pay remains high due to a high deductible).  She also switch from amitriptyline to nortriptyline 25 mg nightly.  Since making these changes her headaches have improved.  She also wakes up feeling refreshed.  She still has about 6-8 breakthrough headaches a month for which she takes Imitrex 100 mg.     Last visit-patient's Ajovy was also denied by insurance therefore we had switched her to Emgality however that was denied as well and we have been giving her samples each month.  She states that her headache frequency has worsened and she is having about 6-10 severe headaches a month for which she takes Imitrex.  Continues to take amitriptyline 10 mg nightly as the higher dose was making her groggy during the daytime.  She also wakes up with a headache occasionally in the back of her head that can radiate to the front but it usually goes away with movement.    Last visit-patient has now started taking Ajovy as her Aimovig was denied again by her insurance.  She has only taken 1 shot of Ajovy thus far and her headache frequency has already improved to about 4-5 headaches last month.  She also continues to take amitriptyline 10 mg nightly which helps with her sleep.  She does wake up feeling slightly groggy.  Also continues to take Imitrex as needed.  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      Last visit-patient  had her MRI brain with and without contrast that was normal.  She also had a CT chest and CT abdomen but did not show any lesions/adenopathy.  Has been cancer free.  With regards to her headaches  she was doing extremely well after she started amitriptyline 10 mg nightly however her insurance stopped paying for her Aimovig therefore she has not been able to get her injections for the past 2 months.  She is currently having over 8 headaches a month for which she takes Imitrex 100 mg.  Of note-I personally reviewed her MRI brain with and without contrast    Last visit- patient last saw me in June 2020.  She has been getting Aimovig monthly shots since then.  She states that initially the Aimovig helped tremendously and she was hardly having any headaches but over the past 4 months her headaches have again worsened in severity and frequency.  In the past when she has had about 15 or more headaches.  Her OB/GYN is also repeating a PET scan to see if she has any recurrence of cancer or metastasis (had double mastectomy for breast cancer 5 years ago and has also had hysterectomy with oophorectomy several years ago for endometriosis).  She states she has also been having difficulty sleeping at night and is back to taking ibuprofen PM as melatonin was not helping.  Takes sumatriptan 100 mg for abortive treatment of her headaches but is running out now.    Last visit-since the last visit patient states that her headaches improved drastically after she got her Aimovig injection at her clinic.  She also reduced her over-the-counter medications and completed her dexamethasone taper pack.  However she was not able to get the Aimovig from her pharmacy and has not had any more injections as it was not approved by her insurance.  In the past month she had about 7 headaches for which she took either ibuprofen or Imitrex.    Initial visit visit-this is a patient previously seen by Dr. Butt for headaches.  Patient also has a history of Massey syndrome with breast cancer and has had bilateral mastectomies followed by chemotherapy about 2 years ago.  At her last visit Dr. Ya had started her on amitriptyline however  patient does not remember starting it.  She is currently on Effexor XR 75 mg at night for her headaches and hot flashes however it only controls the hot flashes but not the headaches.  In the past she has also tried Topamax and zonisamide both of which either caused cognitive side effects or did not help.  Patient states that she is currently getting a headache almost every day on waking up in the morning.  She also snores a little at night and is tired throughout the day.  The headache is mainly in the front and moves from one side to the other along with occasional blurred vision, photophobia and phonophobia but no nausea or vomiting.  She takes either Imitrex, Aleve or ibuprofen almost every day for the headaches.  Of note-I reviewed Dr. Butt's notes     The following portions of the patient's history were reviewed today and updated as of 07/11/2024  : allergies, current medications, past family history, past medical history, past social history, past surgical history, and problem list  These document will be scanned to patient's chart.      Current Outpatient Medications:     apixaban (ELIQUIS) 5 MG tablet tablet, Take 1 tablet by mouth Every 12 (Twelve) Hours for 90 days. Indications: Vertebral artery dissections, Basilar artery dissection, R cerebellar infarct, Disp: 60 tablet, Rfl: 2    atorvastatin (LIPITOR) 40 MG tablet, Take 1 tablet by mouth Every Night for 30 days., Disp: 30 tablet, Rfl: 0    cyclobenzaprine (FLEXERIL) 5 MG tablet, Take 1 tablet by mouth At Night As Needed for Muscle Spasms., Disp: 30 tablet, Rfl: 3    Multiple Vitamins-Minerals (HAIR SKIN AND NAILS FORMULA) tablet, Take  by mouth Daily., Disp: , Rfl:     polyethylene glycol (MIRALAX) packet, Take 17 g by mouth As Needed., Disp: , Rfl:     Rimegepant Sulfate (Nurtec) 75 MG tablet dispersible tablet, Place 1 tablet under the tongue Daily As Needed (at the onset of headache, Max of 75 mg/24 hours)., Disp: 16 tablet, Rfl: 11     venlafaxine (EFFEXOR) 75 MG tablet, Take 1 tablet by mouth 2 (Two) Times a Day., Disp: 60 tablet, Rfl: 11    nortriptyline (Pamelor) 50 MG capsule, Take 1 capsule by mouth Every Night., Disp: 30 capsule, Rfl: 5   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    Massey syndrome     Malignant neoplasm of lower-outer quadrant of left female breast 09/28/2016    Migraines 09/21/2016    Multiple gestation     I have a 13 and 15 year old    Osteoarthritis I think I have this    Pain dr I believe checked    Osteoporosis Pain everywhere    Pelvic pain 09/21/2016    Peripheral neuropathy     Stroke 6/26/24    Had 3 strokes that day    Wears glasses     Well woman exam with routine gynecological exam 10/25/2017      Past Surgical History:   Procedure Laterality Date    BREAST BIOPSY  9/22/16    Result cancer    BREAST RECONSTRUCTION, BREAST TISSUE EXPANDER INSERTION Bilateral 11/01/2016    Procedure: BILATERAL BREAST IMMEDIATE RECONSTRUCTION, BREAST TISSUE EXPANDER INSERTION;  Surgeon: Kenny Massey MD;  Location: [facility] OR;  Service:     BREAST RECONSTRUCTION, BREAST TISSUE EXPANDER REMOVAL, IMPLANT INSERTION Left 06/09/2017    Procedure: PLACEMENT OF LEFT RECONSTRUCTED BREAST TISSUE EXPANDER ;  Surgeon: Kenny Massey MD;  Location: [facility] OR;  Service:     BREAST SURGERY      COLONOSCOPY  10/14/2016    DILATION AND CURETTAGE, DIAGNOSTIC / THERAPEUTIC      x2    HYSTERECTOMY      MASTECTOMY W/ SENTINEL NODE BIOPSY  "Bilateral 11/01/2016    Procedure: BILATERAL BREAST MASTECTOMY WITH LEFT SENTINEL NODE BIOPSY POSS AXILLARY NODE DISECTION;  Surgeon: Jose F Johnson MD;  Location: [facility];  Service:     OOPHORECTOMY      TOTAL ABDOMINAL HYSTERECTOMY WITH SALPINGO OOPHORECTOMY  09/01/2011    Endometriosis    UPPER GASTROINTESTINAL ENDOSCOPY  11/19/2014    VENOUS ACCESS DEVICE (PORT) REMOVAL Right 03/17/2017    WISDOM TOOTH EXTRACTION  08/01/1999    3 teeth removed      Family History   Problem Relation Age of Onset    Breast cancer Mother         Mom    Uterine cancer Mother         Mom    Migraines Mother         All the time from what I remember    Breast cancer Maternal Grandmother         Great grandmother    Colon cancer Maternal Grandmother         Believe she went to [facility]    Alzheimer's disease Other     Cancer Other     Prostate cancer Paternal Grandfather         Went to [facility]      Social History     Socioeconomic History    Marital status:    Tobacco Use    Smoking status: Never     Passive exposure: Never    Smokeless tobacco: Never    Tobacco comments:     None ever   Vaping Use    Vaping status: Never Used   Substance and Sexual Activity    Alcohol use: Yes     Comment: Very seldom 3-4 times a year    Drug use: Never    Sexual activity: Yes     Partners: Male     Birth control/protection: Post-menopausal, None, Hysterectomy     Comment: Hurts / no hormones / we barely try anymore     Review of Systems   Neurological:  Positive for headache.   All other systems reviewed and are negative.      Objective:    /100   Pulse 87   Ht 167.6 cm (66\")   Wt 91.2 kg (201 lb)   SpO2 97%   BMI 32.44 kg/m²     Neurology Exam:    General apperance: NAD.     Mental status: Alert, awake and oriented to time place and person.    Recent and Remote memory: Intact.    Attention span and Concentration: Normal.     Language and Speech: Intact- No dysarthria.    Fluency, Naming , Repitition and Comprehension:  " Intact    Cranial Nerves:   CN II: Visual fields are full. Intact. Fundi - Normal, No papillederma, Pupils - ALTHEA  CN III, IV and VI: Extraocular movements are intact. Normal saccades.   CN V: Facial sensation is intact.   CN VII: Muscles of facial expression reveal no asymmetry. Intact.   CN VIII: Hearing is intact. Whispered voice intact.   CN IX and X: Palate elevates symmetrically. Intact  CN XI: Shoulder shrug is intact.   CN XII: Tongue is midline without evidence of atrophy or fasciculation.     Ophthalmoscopic exam of optic disc-normal    Motor:  Right UE muscle strength 5/5. Normal tone.     Left UE muscle strength 5/5. Normal tone.      Right LE muscle strength5/5. Normal tone.     Left LE muscle strength 5/5. Normal tone.      Sensory: Normal light touch, vibration and pinprick sensation bilaterally.    DTRs: 2+ bilaterally in upper and lower extremities.    Babinski: Negative bilaterally.    Co-ordination: Normal finger-to-nose, heel to shin B/L.    Rhomberg: Negative.    Gait: Normal.    Cardiovascular: Regular rate and rhythm without murmur, gallop or rub.    Assessment and Plan:  1. Intractable migraine without aura and without status migrainosus  She will check with her insurance to see if Botox will be covered in the future as she had a high co-pay with her first 1.  If not we will try to get an occipital nerve block approved  For now she should increase her nortriptyline to 50 mg nightly and speak to her PCP about starting a blood pressure medication which could also help with headaches  I have given her samples of Ubrelvy to try as Nurtec does not help much    2. Sequelae, post-stroke  Patient had bilateral vertebral artery dissections in addition to basilar artery dissection as well as right cerebellar strokes.  Also has a PFO  She is currently on Eliquis 5 mg twice daily and Lipitor 40 mg daily.  Last LDL was 78 but triglycerides were 282.  I have counseled her on dietary modifications  Goal  blood pressure less than 130/90.  Her blood pressure has been staying elevated since the stroke.  I have told her to speak to her PCP about starting her on an antihypertensive.  She should also monitor her blood pressure at home    Return in about 1 week (around 7/18/2024).     I spent over 40 minutes with the patient face to face out of which over 50% (30 minutes) was spent in management, instructions and education.     Sara Rocha MD

## 2024-07-16 ENCOUNTER — READMISSION MANAGEMENT (OUTPATIENT)
Dept: CALL CENTER | Facility: HOSPITAL | Age: 44
End: 2024-07-16
Payer: COMMERCIAL

## 2024-07-16 NOTE — OUTREACH NOTE
Medical Week 2 Survey      Flowsheet Row Responses   [facility] patient discharged from? [place]   Does the patient have one of the following disease processes/diagnoses(primary or secondary)? Other   Week 2 attempt successful? No   Unsuccessful attempts Attempt 2            Sherrie SARAH - Registered Nurse

## 2024-07-19 ENCOUNTER — PROCEDURE VISIT (OUTPATIENT)
Dept: NEUROLOGY | Facility: CLINIC | Age: 44
End: 2024-07-19
Payer: COMMERCIAL

## 2024-07-19 DIAGNOSIS — G43.019 INTRACTABLE MIGRAINE WITHOUT AURA AND WITHOUT STATUS MIGRAINOSUS: Primary | ICD-10-CM

## 2024-07-19 PROCEDURE — 64615 CHEMODENERV MUSC MIGRAINE: CPT | Performed by: PSYCHIATRY & NEUROLOGY

## 2024-07-22 NOTE — PROGRESS NOTES
Botox Procedure Note-    Current headache frequency: 20-25__ headaches days / month      The risks and benefits were discussed with the patient. The patient was given the opportunity to ask questions. Informed consent form was signed.     Onabotulinumtoxin A was reconstituted with 0.9% normal saline. All injections sites were prepped with alcohol swab. Approximately 5 units of botox were injected into each of the following sites  as per routine migraine botox injection PREEMPT protocol:  (2 injections), procerus (1 injection), frontalis (4 injections), temporalis (8 injections), occipitalis (6 injections) cervical, upper cervical paraspinals (4 injections),  trapezius (6 injections) and masseters (2 injections) for a total of 33 sites.  The patient tolerated the procedure well. There were no immediate complications. The patient will follow up in approximately 12 weeks for her next injection.     Total amount of onabotulinumtoxin A injected was 165 units with 35 units wasted.     Additional notes: Patient is here for her second Botox cycle.  Patient has increased her dose of nortriptyline to 50 mg nightly since her last visit 1 week ago.  Her blood pressure has started to drop down within the normal range.  I gave her 2 extra shots in her jaw today as she grinds her teeth for which she wears a mouthguard.  For abortive treatment of her headaches I gave her samples of Ubrelvy to try as Nurtec does not always help.

## 2024-07-23 ENCOUNTER — SPECIALTY PHARMACY (OUTPATIENT)
Dept: NEUROLOGY | Facility: CLINIC | Age: 44
End: 2024-07-23
Payer: COMMERCIAL

## 2024-07-24 ENCOUNTER — OFFICE VISIT (OUTPATIENT)
Dept: NEUROLOGY | Facility: CLINIC | Age: 44
End: 2024-07-24
Payer: COMMERCIAL

## 2024-07-24 VITALS
WEIGHT: 211 LBS | TEMPERATURE: 97.7 F | SYSTOLIC BLOOD PRESSURE: 132 MMHG | OXYGEN SATURATION: 96 % | DIASTOLIC BLOOD PRESSURE: 78 MMHG | HEIGHT: 66 IN | BODY MASS INDEX: 33.91 KG/M2 | HEART RATE: 102 BPM

## 2024-07-24 DIAGNOSIS — I77.74 DISSECTION, VERTEBRAL ARTERY: ICD-10-CM

## 2024-07-24 DIAGNOSIS — G43.019 INTRACTABLE MIGRAINE WITHOUT AURA AND WITHOUT STATUS MIGRAINOSUS: Primary | ICD-10-CM

## 2024-07-24 DIAGNOSIS — E78.5 HYPERLIPIDEMIA, UNSPECIFIED HYPERLIPIDEMIA TYPE: ICD-10-CM

## 2024-07-24 DIAGNOSIS — I63.89 CEREBROVASCULAR ACCIDENT (CVA) DUE TO OTHER MECHANISM: ICD-10-CM

## 2024-07-24 PROCEDURE — 99214 OFFICE O/P EST MOD 30 MIN: CPT

## 2024-07-24 NOTE — PROGRESS NOTES
New Patient Office Visit      Encounter Date: 2024   Patient Name: Edna Motley  : 1980   MRN: [id number]   PCP: José Luis Redman MD    Referring Provider: Akua Franklin MD     Chief Complaint:    Chief Complaint   Patient presents with Headaches s/p right cerebellar infarct    Follow-up       History of Present Illness: Edna Motley is a 43 y.o. female with known medical diagnoses of  migraines (followed by Dr. Rocha), Massey syndrome, breast cancer, CTS, and depression who presented to [facility] on 24 with sudden onset dizziness, nausea, vomiting after chiropractic adjustment.  NIH 1. stroke neurology was consulted for further evaluation .  CT head was negative for acute abnormality. 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  MRI detected multiple right cerebellar infarcts.  Neurosurgical intervention deferred given patient's low NIH.  She was started on a heparin drip and closely monitored throughout hospital stay and transition to Eliquis 5 mg twice daily upon discharge along with atorvastatin 40 mg nightly.  Patient was also discovered to have a PFO upon TTE evaluation.  It was not felt that the PFO was the source of the patient's acute stroke.      Clinic visit 2024: Patient presents today as a 1 month hospital follow-up appointment.  She has no current symptoms of dizziness nausea or vomiting.  Denies current visual changes or weakness/numbness to extremities.  Her headaches have persisted and she currently rates a posterior headache/pain 5/10.  She does regularly see Dr. Rocha for migraines.  Patient received a Botox injection on  and plans to continue Botox injections every 3 months for headache treatment.  She has some bloodshot appearance to right eye following Botox injections which the patient states is also occurred only previous Botox injection. Occipital nerve block is being  considered if Botox is unsuccessful in resolution.  She was also given a sample of Ubrelvy, but has not taken this medication yet.  Patient is compliant with Eliquis 5 mg twice daily and I did discuss with the patient that she would likely need to be on this medication for 6 months to 1 year post stroke.  Tolerating atorvastatin 40 mg nightly without issue.  I discussed with the patient her current PFO discovered on echo.  She has not met with cardiology to discuss this and at this time patient has deferred an outpatient cardiology referral given the likelihood that the dissection and subsequent stroke was caused by chiropractic manipulation.  Patient states that she does regularly check her blood pressure and is regularly less than 130/90.  Today's blood pressure 132/78.  She currently sees Dr. Redman as primary care physician and will continue to follow-up for routine labs and blood pressure monitoring.  I discussed signs and symptoms of stroke with the patient and advised her to seek emergent treatment for any future strokelike symptoms.  Patient was agreeable to follow-up in stroke clinic in approximately 3 months.     Stroke Risk Factors: hypertension      Subjective      Past Medical History:   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    Massey syndrome     Malignant neoplasm of lower-outer quadrant of left female breast 09/28/2016    Migraines 09/21/2016    Multiple gestation     I have a 13  and 15 year old    Osteoarthritis I think I have this    Pain  I believe checked    Osteoporosis Pain everywhere    Pelvic pain 09/21/2016    Peripheral neuropathy     Stroke 6/26/24    Had 3 strokes that day    Wears glasses     Well woman exam with routine gynecological exam 10/25/2017       Past Surgical History:   Past Surgical History:   Procedure Laterality Date    BREAST BIOPSY  9/22/16    Result cancer    BREAST RECONSTRUCTION, BREAST TISSUE EXPANDER INSERTION Bilateral 11/01/2016    Procedure: BILATERAL BREAST IMMEDIATE RECONSTRUCTION, BREAST TISSUE EXPANDER INSERTION;  Surgeon: Kenny Massey MD;  Location:  [facility];  Service:     BREAST RECONSTRUCTION, BREAST TISSUE EXPANDER REMOVAL, IMPLANT INSERTION Left 06/09/2017    Procedure: PLACEMENT OF LEFT RECONSTRUCTED BREAST TISSUE EXPANDER ;  Surgeon: Kenny Massey MD;  Location:  [facility];  Service:     BREAST SURGERY      COLONOSCOPY  10/14/2016    DILATION AND CURETTAGE, DIAGNOSTIC / THERAPEUTIC      x2    HYSTERECTOMY      MASTECTOMY W/ SENTINEL NODE BIOPSY Bilateral 11/01/2016    Procedure: BILATERAL BREAST MASTECTOMY WITH LEFT SENTINEL NODE BIOPSY POSS AXILLARY NODE DISECTION;  Surgeon: Jose F Johnson MD;  Location:  [facility];  Service:     OOPHORECTOMY      TOTAL ABDOMINAL HYSTERECTOMY WITH SALPINGO OOPHORECTOMY  09/01/2011    Endometriosis    UPPER GASTROINTESTINAL ENDOSCOPY  11/19/2014    VENOUS ACCESS DEVICE (PORT) REMOVAL Right 03/17/2017    WISDOM TOOTH EXTRACTION  08/01/1999    3 teeth removed       Family History:   Family History   Problem Relation Age of Onset    Breast cancer Mother         Mom    Uterine cancer Mother         Mom    Migraines Mother         All the time from what I remember    Breast cancer Maternal Grandmother         Great grandmother    Colon cancer Maternal Grandmother         Believe she went to [facility]    Alzheimer's disease Other     Cancer Other     Prostate cancer Paternal Grandfather         Went to  [place]       Social History:   Social History     Socioeconomic History    Marital status:    Tobacco Use    Smoking status: Never     Passive exposure: Never    Smokeless tobacco: Never    Tobacco comments:     None ever   Vaping Use    Vaping status: Never Used   Substance and Sexual Activity    Alcohol use: Yes     Comment: Very seldom 3-4 times a year    Drug use: Never    Sexual activity: Yes     Partners: Male     Birth control/protection: Post-menopausal, None, Hysterectomy     Comment: Hurts / no hormones / we barely try anymore       Medications:     Current Outpatient Medications:     apixaban (ELIQUIS) 5 MG tablet tablet, Take 1 tablet by mouth Every 12 (Twelve) Hours for 90 days. Indications: Vertebral artery dissections, Basilar artery dissection, R cerebellar infarct, Disp: 60 tablet, Rfl: 2    atorvastatin (LIPITOR) 40 MG tablet, Take 1 tablet by mouth Every Night for 30 days., Disp: 30 tablet, Rfl: 0    cyclobenzaprine (FLEXERIL) 5 MG tablet, Take 1 tablet by mouth At Night As Needed for Muscle Spasms., Disp: 30 tablet, Rfl: 3    Multiple Vitamins-Minerals (HAIR SKIN AND NAILS FORMULA) tablet, Take  by mouth Daily., Disp: , Rfl:     nortriptyline (Pamelor) 50 MG capsule, Take 1 capsule by mouth Every Night., Disp: 30 capsule, Rfl: 5    polyethylene glycol (MIRALAX) packet, Take 17 g by mouth As Needed., Disp: , Rfl:     ubrogepant (Ubrelvy) 100 MG tablet, Take one tablet at onset of migraine. May repeat in 2 hours if needed. Max of 2 tablets in 24 hours., Disp: 16 tablet, Rfl: 11    venlafaxine (EFFEXOR) 75 MG tablet, Take 1 tablet by mouth 2 (Two) Times a Day., Disp: 60 tablet, Rfl: 11    Allergies:   Allergies   Allergen Reactions    Penicillins GI Intolerance       Objective     Physical Exam:  Vital Signs:   Vitals:    07/24/24 0824   BP: 132/78   Pulse: 102   Temp: 97.7 °F (36.5 °C)   SpO2: 96%   Weight: 95.7 kg (211 lb)   Height: 167.6 cm (65.98\")     Body mass index is 34.07 kg/m². "     Physical Exam  Constitutional:       General: She is not in acute distress.     Appearance: She is not ill-appearing.   HENT:      Head: Normocephalic and atraumatic.      Mouth/Throat:      Pharynx: Oropharynx is clear.   Eyes:      General: Lids are normal.      Extraocular Movements: Extraocular movements intact.      Pupils: Pupils are equal, round, and reactive to light.   Cardiovascular:      Rate and Rhythm: Tachycardia present.   Pulmonary:      Effort: Pulmonary effort is normal. No respiratory distress.   Musculoskeletal:         General: No signs of injury.      Right lower leg: No edema.      Left lower leg: No edema.   Skin:     General: Skin is warm.      Findings: No bruising.   Neurological:      Mental Status: She is oriented to person, place, and time. Mental status is at baseline.      Cranial Nerves: No cranial nerve deficit.      Sensory: No sensory deficit.      Motor: Motor strength is normal.No weakness.      Coordination: Coordination normal.   Psychiatric:         Mood and Affect: Mood normal.         Speech: Speech normal.       Neurological Exam  Mental Status  Awake, alert and oriented to person, place and time. Oriented to person, place and time. Oriented to person, place, and time. Speech is normal. Language is fluent with no aphasia.    Cranial Nerves  CN II: Visual fields full to confrontation.  CN III, IV, VI: Extraocular movements intact bilaterally. Normal lids and orbits bilaterally. Pupils equal round and reactive to light bilaterally.  CN V: Facial sensation is normal.  CN VII: Full and symmetric facial movement.  CN XII: Tongue midline without atrophy or fasciculations.    Motor  Normal muscle bulk throughout. No fasciculations present. Normal muscle tone. Strength is 5/5 throughout all four extremities.    Sensory  Sensation is intact to light touch, pinprick, vibration and proprioception in all four extremities.    Coordination  Right: Finger-to-nose normal. Heel-to-shin  normal.Left: Finger-to-nose normal. Heel-to-shin normal.    Gait  Casual gait is normal including stance, stride, and arm swing.       NIH Stroke Scale    1a  Level of consciousness: 0=alert; keenly responsive   1b. LOC questions:  0=Answers both questions correctly    1c. LOC commands: 0=Performs both tasks correctly   2.  Best Gaze: 0=normal   3. Visual: 0=No visual loss   4. Facial Palsy: 0=Normal symmetric movement   5a. Motor left arm: 0=No drift, limb holds 90 (or 45) degrees for full 10 seconds   5b.  Motor right arm: 0=No drift, limb holds 90 (or 45) degrees for full 10 seconds   6a. Motor left le=No drift, limb holds 90 (or 45) degrees for full 10 seconds   6b  Motor right le=No drift, limb holds 90 (or 45) degrees for full 10 seconds   7. Limb Ataxia: 0=Absent   8.  Sensory: 0=Normal; no sensory loss   9. Best Language:  0=No aphasia, normal   10. Dysarthria: 0=Normal   11. Extinction and Inattention: 0=No abnormality    Total:   0         Modified [place] Score: 0        0  No Symptoms    1 No significant disability. Able to carry out all usual activities, despite some symptoms.    2 Slight disability. Able to look after own affairs without assistance, but unable to carry out all previous activities.    3 Moderate disability. Requires some help, but able to walk unassisted.    4 Moderately severe disability. Unable to attend to own bodily needs without assistance, and unable to walk unassisted.    5 Severe disability. Requires constant nursing care and attention, bedridden, incontinent.    6 Dead        PHQ-9 Depression Screening  Little interest or pleasure in doing things? 0-->not at all   Feeling down, depressed, or hopeless? 0-->not at all   Trouble falling or staying asleep, or sleeping too much?     Feeling tired or having little energy?     Poor appetite or overeating?     Feeling bad about yourself - or that you are a failure or have let yourself or your family down?     Trouble  concentrating on things, such as reading the newspaper or watching television?     Moving or speaking so slowly that other people could have noticed? Or the opposite - being so fidgety or restless that you have been moving around a lot more than usual?     Thoughts that you would be better off dead, or of hurting yourself in some way?     PHQ-9 Total Score 0   If you checked off any problems, how difficult have these problems made it for you to do your work, take care of things at home, or get along with other people?       STOP-Bang Score  Have you been diagnosed with Sleep Apnea?: no (test taken about a year ago)       [facility] Fall Risk Clinician Key Questions   Have you fallen in the past year?: No  Do you feel unsteady with walking?: No  Are you worried about falling?: No        Imaging Reviewed:     MRI Brain Without Contrast 6/26/2024  Impression: Multiple infarcts of the right cerebellar hemisphere predominantly lacunar.      CT Head Without Contrast Stroke Protocol 6/26/2024  Impression: No acute intracranial abnormality.      CT Angiogram Head and Neck 6/26/2024  Impression: Bilateral extracranial vertebral artery dissections as above. The basilar artery likewise appears dissected.      Transthoracic echocardiogram 6/27/2024  Impression:    Left ventricular systolic function is normal. Estimated left ventricular EF = 65% Left ventricular ejection fraction appears to be 61 - 65%.    The cardiac valves are anatomically and functionally normal.    Saline test results are positive with valsalva manuever.    Laboratory Results:     CBC w/diff          6/27/2024    05:20 6/28/2024    04:39 6/29/2024    06:49   CBC w/Diff   WBC 8.61  6.68  8.05    RBC 4.50  5.00  5.02    Hemoglobin 13.9  15.7  15.8    Hematocrit 40.1  47.1  46.8    MCV 89.1  94.2  93.2    MCH 30.9  31.4  31.5    MCHC 34.7  33.3  33.8    RDW 12.0  12.4  12.3    Platelets 227  213  241    Neutrophil Rel % 68.1      Immature Granulocyte Rel % 0.5       Lymphocyte Rel % 25.0      Monocyte Rel % 6.0      Eosinophil Rel % 0.2      Basophil Rel % 0.2         A1C 5.20  LDL 78  AST/ALT 15/23     Assessment / Plan      Assessment/Plan:     History of Stroke (Primary)  - Right cerebellar infarct 2/2 Bilateral vertebral artery dissections and Basilar artery dissection  -Continue apixaban 5 mg BID. Anticipate duration at least 6 months to 1 year with at least antiplatelet monotherapy lifelong thereafter.    -Continue atorvastatin 40 mg daily  -SBP goals normotensive < 130/90  -Patient will follow with PCP for routine lab work and HTN monitoring  -Advised dissection precautions including avoidance of cervical manipulation, cervical trauma, sports injuries, or whiplash movements     2. Acute on chronic headaches  -Patient follows in headache clinic with Dr. Rocha  -Botox injections on July 19 (plan to continue every 3 months)  - Sample of Ubrelvy given per Dr. Rocha  -Avoid NSAIDs to reduce bleed risk  -Recommend outpatient nerve block discussions if headaches continue unresolved     3.  Patent foramen ovale  -Recommend Cardiology evaluation as outpatient; PFO not mechanism of infarct   -Patient has elected to defer Cardiology evaluation at this time, but may consider in future.     Discussed the importance of medication compliance and lifestyle modifications (adequate blood pressure control, adequate control of hyperlipidemia, adequate glycemic control, increase physical activity, and healthy diet) to help reduce the risk of future cerebrovascular events.  Also discussed the signs symptoms that would warrant the patient return back to the emergency department including unilateral weakness, unilateral numbness, visual disturbances, loss of balance, speech difficulties, and/or a sudden severe headache.  Patient acknowledged understanding    Follow Up:   Stroke Clinic in 3 months     Fam Leggett PA-C  [facility]

## 2024-07-25 ENCOUNTER — TELEPHONE (OUTPATIENT)
Dept: GYNECOLOGIC ONCOLOGY | Facility: CLINIC | Age: 44
End: 2024-07-25
Payer: COMMERCIAL

## 2024-07-25 NOTE — TELEPHONE ENCOUNTER
Caller: Edna Motley    Relationship to patient: Self    Best call back number: [phone]    Chief complaint: [place] BE [place]AVING [place] NOV 6TH AND WONT BE BACK UNTIL DEC 15TH     Type of visit: PAP SMEAR /PELVIC EXAM     Requested date: NEEDING BEFORE 11/06 OR SOMETIME AFTER 12/15     If rescheduling, when is the original appointment: 11/12

## 2024-07-26 ENCOUNTER — READMISSION MANAGEMENT (OUTPATIENT)
Dept: CALL CENTER | Facility: HOSPITAL | Age: 44
End: 2024-07-26
Payer: COMMERCIAL

## 2024-07-26 NOTE — OUTREACH NOTE
Medical Week 3 Survey      Flowsheet Row Responses   [facility] patient discharged from? [place]   Does the patient have one of the following disease processes/diagnoses(primary or secondary)? Other   Week 3 attempt successful? Yes   Call start time 1541   Call end time 1543   Discharge diagnosis Dissection, vertebral artery   Meds reviewed with patient/caregiver? Yes   Is the patient having any side effects they believe may be caused by any medication additions or changes? No   Does the patient have all medications ordered at discharge? Yes   Is the patient taking all medications as directed (includes completed medication regime)? Yes   Comments regarding appointments Has had Neurology Follow up   Does the patient have a primary care provider?  Yes   Does the patient have an appointment with their PCP within 7 days of discharge? No   What is preventing the patient from scheduling follow up appointments within 7 days of discharge? Haven't had time   Nursing Interventions Educated patient on importance of making appointment, Advised patient to make appointment   Has the patient kept scheduled appointments due by today? N/A   Has home health visited the patient within 72 hours of discharge? N/A   Psychosocial issues? No   Did the patient receive a copy of their discharge instructions? Yes   Nursing interventions Reviewed instructions with patient   What is the patient's perception of their health status since discharge? Improving   Is the patient/caregiver able to teach back signs and symptoms related to disease process for when to call PCP? Yes   Is the patient/caregiver able to teach back signs and symptoms related to disease process for when to call 911? Yes   Is the patient/caregiver able to teach back the hierarchy of who to call/visit for symptoms/problems? PCP, Specialist, Home health nurse, Urgent Care, ED, 911 Yes   Week 3 Call Completed? Yes   Graduated Yes   Is the patient interested in additional  calls from an ambulatory ? No   Would this patient benefit from a Referral to [facility] Social Work? No   Call end time [id number]            Vonda T - Registered Nurse

## 2024-08-29 ENCOUNTER — TELEPHONE (OUTPATIENT)
Dept: NEUROLOGY | Facility: CLINIC | Age: 44
End: 2024-08-29
Payer: COMMERCIAL

## 2024-08-29 RX ORDER — ATORVASTATIN CALCIUM 40 MG/1
40 TABLET, FILM COATED ORAL DAILY
COMMUNITY
End: 2024-08-29 | Stop reason: SDUPTHER

## 2024-08-29 RX ORDER — ATORVASTATIN CALCIUM 40 MG/1
40 TABLET, FILM COATED ORAL DAILY
Qty: 90 TABLET | Refills: 0 | Status: SHIPPED | OUTPATIENT
Start: 2024-08-29

## 2024-08-29 NOTE — TELEPHONE ENCOUNTER
MEDICATION REFILL REQUEST    Caller: Edna Motley    Relationship: Self    Best call back number: [phone]    Requested Prescriptions:  ATORVASTATIN 40MG TABLET- TAKING 1 TABLET BY MOUTH DAILY (UNABLE TO ATTACH VIA SMARTLINK AS MEDICATION IS MARKED AS  IN CHART).    Pharmacy where request should be sent: [id number] - [facility] [address] [facility] [address] - [phone]  - [phone] FX     Last office visit with prescribing clinician: 2024   Last telemedicine visit with prescribing clinician: Visit date not found   Next office visit with prescribing clinician: 10/24/2024 W/ SHARONA DE LEON    Additional details provided by patient: PT STATES SHE HAS BEEN UNABLE TO GET IN WITH HER PCP SINCE HER STROKE. SHE IS ASKING IF OUR OFFICE CAN SEND IN 1 TIME 90-DAY SUPPLY OF ATORVASTATIN TO LAST UNTIL SHE IS ABLE TO GET IN TO SEE HER PCP.    PT STATES SHE HAS BEEN OUT OF THE MEDICATION FOR 1 WEEK.    Does the patient have less than a 3 day supply?:  [x] Yes  [] No    Would you like a call back once the refill request has been completed?: [] Yes  [x] No    If the office needs to give you a call back, can they leave a voicemail?: [] Yes  [x] No    PLEASE REVIEW AND ADVISE.    Mary Rosen Rep   24 10:51 EDT

## 2024-08-29 NOTE — TELEPHONE ENCOUNTER
Rx Refill Note  Requested Prescriptions     Pending Prescriptions Disp Refills    atorvastatin (LIPITOR) 40 MG tablet 90 tablet 0     Sig: Take 1 tablet by mouth Daily.      Last office visit with prescribing clinician: 7/24/2024   Last telemedicine visit with prescribing clinician: Visit date not found   Next office visit with prescribing clinician: 10/24/24  Zeina Burton MA  08/29/24, 11:25 EDT

## 2024-09-10 ENCOUNTER — OFFICE VISIT (OUTPATIENT)
Dept: ONCOLOGY | Facility: CLINIC | Age: 44
End: 2024-09-10
Payer: COMMERCIAL

## 2024-09-10 ENCOUNTER — PATIENT MESSAGE (OUTPATIENT)
Dept: NEUROLOGY | Facility: CLINIC | Age: 44
End: 2024-09-10
Payer: COMMERCIAL

## 2024-09-10 VITALS
BODY MASS INDEX: 33.91 KG/M2 | OXYGEN SATURATION: 97 % | HEART RATE: 105 BPM | SYSTOLIC BLOOD PRESSURE: 118 MMHG | DIASTOLIC BLOOD PRESSURE: 86 MMHG | WEIGHT: 211 LBS | TEMPERATURE: 97.1 F | HEIGHT: 66 IN | RESPIRATION RATE: 16 BRPM

## 2024-09-10 DIAGNOSIS — Z85.3 HISTORY OF LEFT BREAST CANCER: ICD-10-CM

## 2024-09-10 DIAGNOSIS — R07.81 RIB PAIN ON LEFT SIDE: ICD-10-CM

## 2024-09-10 DIAGNOSIS — Z15.09 LYNCH SYNDROME: Primary | ICD-10-CM

## 2024-09-10 DIAGNOSIS — R10.12 LEFT UPPER QUADRANT PAIN: ICD-10-CM

## 2024-09-10 PROCEDURE — 99215 OFFICE O/P EST HI 40 MIN: CPT | Performed by: NURSE PRACTITIONER

## 2024-09-10 NOTE — PROGRESS NOTES
CHIEF COMPLAINT: Left rib pain and left upper quadrant abdominal pain    Problem List:  Oncology/Hematology History Overview Note   1.  History of breast cancer, stage 1A,T1aN0, 2 mm Invasive ductal carcinoma with multifocal high-grade DCIS of the left breast. No sentinel node involvement out of 2. Curry Niño 7 out of 9. Estrogen and progesterone receptor 0 and HER-2/angie 100% 3+ positive.   -Completed 4 cycles of adjuvant therapy with Taxotere and Cytoxan 2/14/2017    2.  Massey syndrome  Surveillance recommendations for Massey syndrome:  ? Colorectal cancer screening with colonoscopy every 1-2 years.   Her last colonoscopy was 2/19/2024 with recommendation to repeat in 1-2 years  ? Gastric cancer screening with EGD every 2-3 years.  She had EGD 2/19/2024 with recommendation to repeat in 3 years  ? Urinary tract cancer screening with urinalysis annually.  This will be done with her next gynecological exam in December, current is negative from June 2024.  ? Skin cancer screening with annual dermatological skin exam.    ? Annual physical exam with neurological examination    3.  Migraines  4.  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  She presented to [facility] 6/26/2024 with sudden onset dizziness, nausea, vomiting after chiropractic adjustment. CT head was negative for acute abnormality. 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  MRI detected multiple right cerebellar infarcts.  Neurosurgical intervention deferred given patient's low NIH.  She was started on a heparin drip and closely monitored throughout hospital stay and transition to Eliquis 5 mg twice daily upon discharge along with atorvastatin 40 mg nightly.  Patient was also discovered to have a PFO upon TTE evaluation.  It was not felt that the PFO was the source of the patient's acute stroke.    5.   Patent foramen ovale     Massey syndrome    Initial Diagnosis    Massey syndrome     12/1/2017 Procedure    Colonoscopy with Dr. Dasilva, negative other than for small internal hemorrhoid.  EGD normal.     Malignant neoplasm of lower-outer quadrant of left breast of female, estrogen receptor negative (Resolved)   8/19/2014 Surgery    Laparoscopic bilateral salpingo-oophorectomy.     9/22/2016 Initial Diagnosis    Left breast cancer     10/6/2016 Genetic Testing    Genetic testing was negative for deleterious mutations by sequencing and rearrangement testing for the genes on this panel (TIFFANIE, BARD1, BRCA1, BRCA2, BRIP1, CDH1, CHEK2, MRE11A, MUTYH, NBN, NF1, PALB2, PTEN, RAD50, RAD51C, RAD51D, and TP53)      11/1/2016 Surgery    Bilateral mastectomy and sentinel node sampling.     12/6/2016 - 2/14/2017 Chemotherapy    OP BREAST TC DOCEtaxel / Cyclophosphamide       11/10/2017 Imaging    Total body bone scan IMPRESSION:  Abnormal activity seen in the uptake of tracer within the S1  level concerning for metastatic process. No fractures seen on flexion  and extension imaging with no definite change in densities to suggest a  metastatic lesion. Continued follow-up is recommended.     12/6/2017 Imaging    PET/CT IMPRESSION:  1.  Supraclavicular pathologic adenopathy is noted with SUV values which  are at the malignant threshold as described above.  2.  Two worrisome small internal mammary lymph nodes which have a  relatively low level hypermetabolic activity but are clearly abnormal  for internal mammary chain nodes.  3.  Slight increased tracer activity identified over the chest wall  anterior and leftward over the soft tissues of the rib cage which is  likely posttreatment or posttraumatic.  4.  No other distant metastatic disease is identified elsewhere.  Abdomen, pelvis, retroperitoneum, liver and adrenal glands are  unremarkable.  5.  Even though the SUV values are not impressive, the right  supraclavicular lymph nodes  of which there are two, and the right  internal mammary chain lymph nodes of which there are two and these are  enlarged for internal mammary lymph nodes exhibit enough hypermetabolic  activity to highly suspect early metastatic disease in these areas.     12/22/2017 Biopsy    Right supraclavicular lymph node excisional biopsy, pathology negative:    Clinical Information See result below   The working history is malignant neoplasm of breast.   Final Diagnosis   SUPRACLAVICULAR LYMPH NODE:  Reactive lymphoid hyperplasia.  Negative for primary and metastatic neoplasm.     JFJ/mbc    Electronically signed by Homero Beyer MD on 12/26/2017 at 1618          2/11/2019 Imaging    PET/CT IMPRESSION:  Interval improvement seen in the examination were two  previous right supraclavicular lymph nodes were identified and today  only one lymph node remains improved in both size and activity in the  Interval.  MRI Lumbar spine IMPRESSION:  At L5-S1 there are degenerative bony changes and  degenerative changes related to the intervertebral discs. Furthermore,  there is a right paracentral disc protrusion at L5-S1. There is no  evidence of metastatic disease.     12/27/2019 Imaging    CT chest IMPRESSION:  Postsurgical changes from bilateral breast implantation and  meniscectomy without bulky axillary adenopathy or soft tissue  mass/abnormality associated with the marked area of interest left  lateral chest wall soft tissues. No acute intrathoracic process.         HISTORY OF PRESENT ILLNESS:  The patient is a 43 y.o. female, here for follow up on management of history of left breast cancer status post bilateral mastectomies and reconstruction as outlined above in the oncology history.  She completed adjuvant therapy for her hormone receptor negative breast cancer February 2017.  She also has Massey syndrome.  It has been almost 5 years since we last saw Edna as she missed her follow-up appointment.  She reports that overall she  had been doing well until this past year.  She was seeing a chiropractor for chronic neck pain and migraines, she developed severe headache in June and presented to the emergency room where she was found to have had bilateral extracranial vertebral artery dissections basilar artery dissection.  She was started on Eliquis at discharge and follows closely with neurology.  She reports that lately she has had some concern over left lower rib pain that has been bothering her for about a month and also discomfort in her left upper quadrant.  She states that her appetite has been normal.  She has some mild shortness of breath with exertion.  No abnormal cough.  No fevers or chills.  She states her bowels are regular, she is up-to-date on colonoscopy and EGD that were both done in February with Dr. Dasilva.    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    Massey syndrome     Malignant neoplasm of lower-outer quadrant of left female breast 09/28/2016    Migraines 09/21/2016    Multiple gestation     I have a 13 and 15 year old    Osteoarthritis I think I have this    Pain  I believe checked    Osteoporosis Pain everywhere    Pelvic pain 09/21/2016    Peripheral neuropathy     Stroke 6/26/24    Had 3 strokes that day    Wears glasses     Well woman exam with routine gynecological exam 10/25/2017     Past Surgical History:   Procedure Laterality Date    BREAST BIOPSY  9/22/16    Result cancer    BREAST  "RECONSTRUCTION, BREAST TISSUE EXPANDER INSERTION Bilateral 11/01/2016    Procedure: BILATERAL BREAST IMMEDIATE RECONSTRUCTION, BREAST TISSUE EXPANDER INSERTION;  Surgeon: Kenny Massey MD;  Location:  [facility];  Service:     BREAST RECONSTRUCTION, BREAST TISSUE EXPANDER REMOVAL, IMPLANT INSERTION Left 06/09/2017    Procedure: PLACEMENT OF LEFT RECONSTRUCTED BREAST TISSUE EXPANDER ;  Surgeon: Kenny Massey MD;  Location:  [facility];  Service:     BREAST SURGERY      COLONOSCOPY  10/14/2016    DILATION AND CURETTAGE, DIAGNOSTIC / THERAPEUTIC      x2    HYSTERECTOMY      MASTECTOMY W/ SENTINEL NODE BIOPSY Bilateral 11/01/2016    Procedure: BILATERAL BREAST MASTECTOMY WITH LEFT SENTINEL NODE BIOPSY POSS AXILLARY NODE DISECTION;  Surgeon: Jose F Johnson MD;  Location:  [facility];  Service:     OOPHORECTOMY      TOTAL ABDOMINAL HYSTERECTOMY WITH SALPINGO OOPHORECTOMY  09/01/2011    Endometriosis    UPPER GASTROINTESTINAL ENDOSCOPY  11/19/2014    VENOUS ACCESS DEVICE (PORT) REMOVAL Right 03/17/2017    WISDOM TOOTH EXTRACTION  08/01/1999    3 teeth removed       Allergies   Allergen Reactions    Penicillins GI Intolerance       Family History and Social History reviewed and changed as necessary    REVIEW OF SYSTEM:   Left lower rib pain  Mild shortness of breath with activity  Left upper quadrant abdominal pain    PHYSICAL EXAM:  Edna looks well today, she is in no distress  Skin tissue over bilateral reconstructed breast is soft, no abnormal masses or nodules  No cervical, supraclavicular or axillary nodes palpable on exam  Abdomen is soft, no organomegaly, slightly tender left upper quadrant    Vitals:    09/10/24 1100   BP: 118/86   Pulse: 105   Resp: 16   Temp: 97.1 °F (36.2 °C)   TempSrc: Infrared   SpO2: 97%   Weight: 95.7 kg (211 lb)   Height: 167.6 cm (66\")     Vitals:    09/10/24 1100   PainSc: 0-No pain          ECOG score: 0           Vitals reviewed.  Labs reviewed, I also reviewed notes from " hospitalization 6/26/2024    ECOG: (0) Fully Active - Able to Carry On All Pre-disease Performance Without Restriction    Lab Results   Component Value Date    HGB 15.8 06/29/2024    HCT 46.8 (H) 06/29/2024    MCV 93.2 06/29/2024     06/29/2024    WBC 8.05 06/29/2024    NEUTROABS 5.86 06/27/2024    LYMPHSABS 2.15 06/27/2024    MONOSABS 0.52 06/27/2024    EOSABS 0.02 06/27/2024    BASOSABS 0.02 06/27/2024       Lab Results   Component Value Date    GLUCOSE 111 (H) 06/29/2024    BUN 8 06/29/2024    CREATININE 0.76 06/29/2024     (L) 06/30/2024    K 4.6 06/29/2024    CL 98 06/29/2024    CO2 21.0 (L) 06/29/2024    CALCIUM 8.9 06/29/2024    PROTEINTOT 6.3 06/27/2024    ALBUMIN 3.6 06/27/2024    BILITOT 0.5 06/27/2024    ALKPHOS 74 06/27/2024    AST 15 06/27/2024    ALT 23 06/27/2024             ASSESSMENT & PLAN:    1.  History of breast cancer, stage 1A,T1aN0, 2 mm Invasive ductal carcinoma with multifocal high-grade DCIS of the left breast. No sentinel node involvement out of 2. Curry Niño 7 out of 9. Estrogen and progesterone receptor 0 and HER-2/angie 100% 3+ positive.   -Completed 4 cycles of adjuvant therapy with Taxotere and Cytoxan 2/14/2017    2.  Massey syndrome  Surveillance recommendations for Massey syndrome:  ? Colorectal cancer screening with colonoscopy every 1-2 years.   Her last colonoscopy was 2/19/2024 with recommendation to repeat in 1-2 years  ? Gastric cancer screening with EGD every 2-3 years.  She had EGD 2/19/2024 with recommendation to repeat in 3 years  ? Urinary tract cancer screening with urinalysis annually.  This will be done with her next gynecological exam in December, current is negative from June 2024.  ? Skin cancer screening with annual dermatological skin exam.    ? Annual physical exam with neurological examination    3.  Migraines  4.  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  She  presented to [facility] 6/26/2024 with sudden onset dizziness, nausea, vomiting after chiropractic adjustment. CT head was negative for acute abnormality. 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  MRI detected multiple right cerebellar infarcts.  Neurosurgical intervention deferred given patient's low NIH.  She was started on a heparin drip and closely monitored throughout hospital stay and transition to Eliquis 5 mg twice daily upon discharge along with atorvastatin 40 mg nightly.  Patient was also discovered to have a PFO upon TTE evaluation.  It was not felt that the PFO was the source of the patient's acute stroke.    5.  Patent foramen ovale  6.  Left lower rib pain  7.  Left upper quadrant abdominal pain    Discussion: I will get a CT of the chest, abdomen and pelvis along with total body bone scan to evaluate in light of her new rib pain and abdominal pain.  She is up-to-date on colonoscopy and EGD.  She has a follow-up scheduled with gynecology/oncology for annual exam in December.  Urinalysis during her hospitalization in June showed no hematuria, they also typically repeat this at her annual pelvic exam.  She will follow-up with neurology in regards to recent stroke.  We discussed cardiology referral for evaluation of PFO, she states she will discuss with her neurologist and her primary care provider.  I will call her with the results of her scans, assuming these are negative and we will continue to see her annually.    I spent 42 minutes caring for Edna on this date of service. This time includes time spent by me in the following activities: preparing for the visit, reviewing tests, obtaining and/or reviewing a separately obtained history, performing a medically appropriate examination and/or evaluation, ordering medications, tests, or procedures, documenting information in the medical record, and care coordination.     Staci UMANA  SHARONA Alegria    09/10/2024

## 2024-09-11 DIAGNOSIS — N95.1 VASOMOTOR SYMPTOMS DUE TO MENOPAUSE: Primary | ICD-10-CM

## 2024-09-11 RX ORDER — ATORVASTATIN CALCIUM 40 MG/1
40 TABLET, FILM COATED ORAL DAILY
Qty: 90 TABLET | Refills: 0 | Status: SHIPPED | OUTPATIENT
Start: 2024-09-11

## 2024-09-11 RX ORDER — VENLAFAXINE 75 MG/1
75 TABLET ORAL 2 TIMES DAILY
Qty: 180 TABLET | Refills: 3 | Status: SHIPPED | OUTPATIENT
Start: 2024-09-11

## 2024-09-11 NOTE — TELEPHONE ENCOUNTER
Rx Refill Note  Requested Prescriptions     Pending Prescriptions Disp Refills    apixaban (ELIQUIS) 5 MG tablet tablet 180 tablet 0     Sig: Take 1 tablet by mouth Every 12 (Twelve) Hours for 90 days. Indications: Vertebral artery dissections, Basilar artery dissection, R cerebellar infarct    atorvastatin (LIPITOR) 40 MG tablet 90 tablet 0     Sig: Take 1 tablet by mouth Daily.      Last office visit with prescribing clinician: Visit date not found   Last telemedicine visit with prescribing clinician: Visit date not found   Next office visit with prescribing clinician: 10/24/2024   Zeina Burton MA  09/11/24, 11:19 EDT

## 2024-09-13 RX ORDER — NORTRIPTYLINE HYDROCHLORIDE 50 MG/1
50 CAPSULE ORAL NIGHTLY
Qty: 90 CAPSULE | Refills: 3 | Status: SHIPPED | OUTPATIENT
Start: 2024-09-13 | End: 2025-09-13

## 2024-09-13 NOTE — TELEPHONE ENCOUNTER
From: Edna Motley  To: Sara Rocha  Sent: 9/10/2024 11:23 PM EDT  Subject: Nortriptylin     Anyway I can get a 90 day supply of nortriptylin? when I go to [place] I’ll be gone for 2 months and I won’t have enough pills otherwise.  Thanks  Edna

## 2024-09-24 RX ORDER — FLUCONAZOLE 150 MG/1
150 TABLET ORAL ONCE
Qty: 1 TABLET | Refills: 0 | Status: SHIPPED | OUTPATIENT
Start: 2024-09-24 | End: 2024-09-24

## 2024-09-26 ENCOUNTER — HOSPITAL ENCOUNTER (OUTPATIENT)
Dept: CT IMAGING | Facility: HOSPITAL | Age: 44
Discharge: HOME | End: 2024-09-26
Admitting: NURSE PRACTITIONER
Payer: COMMERCIAL

## 2024-09-26 ENCOUNTER — HOSPITAL ENCOUNTER (OUTPATIENT)
Dept: NUCLEAR MEDICINE | Facility: HOSPITAL | Age: 44
Discharge: HOME | End: 2024-09-26
Payer: COMMERCIAL

## 2024-09-26 DIAGNOSIS — R07.81 RIB PAIN ON LEFT SIDE: ICD-10-CM

## 2024-09-26 DIAGNOSIS — Z15.09 LYNCH SYNDROME: ICD-10-CM

## 2024-09-26 DIAGNOSIS — Z85.3 HISTORY OF LEFT BREAST CANCER: ICD-10-CM

## 2024-09-26 DIAGNOSIS — R10.12 LEFT UPPER QUADRANT PAIN: ICD-10-CM

## 2024-09-26 PROCEDURE — A9503 TC99M MEDRONATE: HCPCS | Performed by: NURSE PRACTITIONER

## 2024-09-26 PROCEDURE — 0 TECHNETIUM MEDRONATE KIT: Performed by: NURSE PRACTITIONER

## 2024-09-26 PROCEDURE — 78306 BONE IMAGING WHOLE BODY: CPT

## 2024-09-26 PROCEDURE — 71260 CT THORAX DX C+: CPT

## 2024-09-26 PROCEDURE — 74177 CT ABD & PELVIS W/CONTRAST: CPT

## 2024-09-26 PROCEDURE — 25510000001 IOPAMIDOL 61 % SOLUTION: Performed by: NURSE PRACTITIONER

## 2024-09-26 RX ORDER — IOPAMIDOL 612 MG/ML
100 INJECTION, SOLUTION INTRAVASCULAR
Status: COMPLETED | OUTPATIENT
Start: 2024-09-26 | End: 2024-09-26

## 2024-09-26 RX ORDER — TC 99M MEDRONATE 20 MG/10ML
26.4 INJECTION, POWDER, LYOPHILIZED, FOR SOLUTION INTRAVENOUS
Status: COMPLETED | OUTPATIENT
Start: 2024-09-26 | End: 2024-09-26

## 2024-09-26 RX ADMIN — IOPAMIDOL 90 ML: 612 INJECTION, SOLUTION INTRAVENOUS at 12:32

## 2024-09-26 RX ADMIN — TC 99M MEDRONATE 26.4 MILLICURIE: 20 INJECTION, POWDER, LYOPHILIZED, FOR SOLUTION INTRAVENOUS at 10:25

## 2024-09-27 RX ORDER — FLUCONAZOLE 150 MG/1
150 TABLET ORAL DAILY
Qty: 2 TABLET | Refills: 0 | Status: SHIPPED | OUTPATIENT
Start: 2024-09-27

## 2024-09-30 ENCOUNTER — TELEPHONE (OUTPATIENT)
Dept: ONCOLOGY | Facility: CLINIC | Age: 44
End: 2024-09-30
Payer: COMMERCIAL

## 2024-09-30 NOTE — TELEPHONE ENCOUNTER
CTs and bone scan reviewed from 9/26/2024, she has degenerative changes on bone scan and chronic changes with CT chest but no evidence of malignancy.  I called and discussed these negative findings with Edna.  She asked about what she could take for her arthritic pain as she cannot take NSAIDs due to being on a blood thinner, I told her that she could take Tylenol.

## 2024-10-18 ENCOUNTER — PROCEDURE VISIT (OUTPATIENT)
Dept: NEUROLOGY | Facility: CLINIC | Age: 44
End: 2024-10-18
Payer: COMMERCIAL

## 2024-10-18 VITALS — SYSTOLIC BLOOD PRESSURE: 130 MMHG | DIASTOLIC BLOOD PRESSURE: 84 MMHG

## 2024-10-18 DIAGNOSIS — G43.019 INTRACTABLE MIGRAINE WITHOUT AURA AND WITHOUT STATUS MIGRAINOSUS: Primary | ICD-10-CM

## 2024-10-18 NOTE — PROGRESS NOTES
Botox Procedure Note-    Current headache frequency: 4-5__ headaches days / month      The risks and benefits were discussed with the patient. The patient was given the opportunity to ask questions. Informed consent form was signed.     Onabotulinumtoxin A was reconstituted with 0.9% normal saline. All injections sites were prepped with alcohol swab. Approximately 5 units of botox were injected into each of the following sites  as per routine migraine botox injection PREEMPT protocol:  (2 injections), procerus (1 injection), frontalis (4 injections), temporalis (8 injections), occipitalis (6 injections) cervical, upper cervical paraspinals (4 injections),  trapezius (6 injections) and masseters (2 injections) for a total of 33 sites.  The patient tolerated the procedure well. There were no immediate complications. The patient will follow up in approximately 12 weeks for her next injection.     Total amount of onabotulinumtoxin A injected was 165 units with 35 units wasted.     Additional notes: Patient states that her headaches have improved significantly.  She only had about 4-5 headaches last month for which she took Ubrelvy as Nurtec does not help.  She continues to take nortriptyline 50 mg nightly that is also helping with her sleep.  She is going for a root canal today which she feels could be causing some of her headaches/jaw tightness for which I gave her Botox.  With regards to her stroke/vertebral and basilar artery dissection she needs to be on Eliquis for a year as per stroke neurology.

## 2024-10-24 ENCOUNTER — OFFICE VISIT (OUTPATIENT)
Dept: NEUROLOGY | Facility: CLINIC | Age: 44
End: 2024-10-24
Payer: COMMERCIAL

## 2024-10-24 VITALS
TEMPERATURE: 98 F | WEIGHT: 210 LBS | HEIGHT: 66 IN | HEART RATE: 118 BPM | BODY MASS INDEX: 33.75 KG/M2 | DIASTOLIC BLOOD PRESSURE: 78 MMHG | SYSTOLIC BLOOD PRESSURE: 126 MMHG | OXYGEN SATURATION: 97 %

## 2024-10-24 DIAGNOSIS — Z86.73 HISTORY OF STROKE: Primary | ICD-10-CM

## 2024-10-24 DIAGNOSIS — Q21.12 PFO (PATENT FORAMEN OVALE): ICD-10-CM

## 2024-10-24 DIAGNOSIS — I77.74 VERTEBRAL ARTERY DISSECTION: ICD-10-CM

## 2024-10-24 DIAGNOSIS — E78.5 HYPERLIPIDEMIA LDL GOAL <70: ICD-10-CM

## 2024-10-24 RX ORDER — METHYLPREDNISOLONE 4 MG
TABLET, DOSE PACK ORAL
COMMUNITY
Start: 2024-10-22

## 2024-10-24 RX ORDER — ZAVEGEPANT 10 MG/.1ML
SPRAY NASAL
Qty: 6 EACH | Refills: 0 | OUTPATIENT
Start: 2024-10-24

## 2024-10-24 RX ORDER — ZAVEGEPANT 10 MG/.1ML
SPRAY NASAL
COMMUNITY
Start: 2024-09-23

## 2024-10-24 RX ORDER — RIMEGEPANT SULFATE 75 MG/75MG
TABLET, ORALLY DISINTEGRATING ORAL
COMMUNITY
Start: 2024-10-22

## 2024-10-24 NOTE — TELEPHONE ENCOUNTER
Rx Refill Note  Requested Prescriptions     Pending Prescriptions Disp Refills    Zavzpret 10 MG/ACT solution [Pharmacy Med Name: Zavzpret Nasal Solution 10 MG/ACT] 6 each 0     Sig: INSTILL 1 SPRAY INTO ONE NOSTRIL AS DIRECTED AND AS NEEDED AT THE ONSET OF HEADACHE. DO NOT USE MORE THAN ONCE A DAY.      Last filled:  Last office visit with prescribing clinician: 7/11/2024      Next office visit with prescribing clinician: 1/10/2025     Dianne Lambert MA  10/24/24, 10:45 EDT

## 2024-10-24 NOTE — PATIENT INSTRUCTIONS
- Continue Eliquis 5 mg twice daily  - Continue atorvastatin 40 mg daily  - Follow-up with Dr. Rocha as recommended for migraine management  - Activities that could cause cervical trauma, cervical manipulation given multiple areas of dissection  - Increase activity as tolerated  - BP goals less than 130/80  - Heart healthy diet  - Return to the ED with any additional stroke symptoms

## 2024-10-24 NOTE — PROGRESS NOTES
Follow Up Office Visit      Encounter Date: 10/24/2024   Patient Name: Edna Motley  : 1980   MRN: [id number]   PCP: Dru Aguirre DO    Referring Provider: No ref. provider found     Chief Complaint:    Chief Complaint   Patient presents with    Follow-up       History of Present Illness: Edna Motley is a 44 y.o. female who is here today to follow up with stroke.    Edna Motley is a 43 y.o. female with known medical diagnoses of  migraines (followed by Dr. Rocha), Massey syndrome, breast cancer, CTS, and depression who presented to [facility] on 24 with sudden onset dizziness, nausea, vomiting after chiropractic adjustment.  NIH 1. stroke neurology was consulted for further evaluation .  CT head was negative for acute abnormality. 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  MRI detected multiple right cerebellar infarcts.  Neurosurgical intervention deferred given patient's low NIH.  She was started on a heparin drip and closely monitored throughout hospital stay and transition to Eliquis 5 mg twice daily upon discharge along with atorvastatin 40 mg nightly.  Patient was also discovered to have a PFO upon TTE evaluation.  It was not felt that the PFO was the source of the patient's acute stroke.       Clinic visit 2024: Patient presents today as a 1 month hospital follow-up appointment.  She has no current symptoms of dizziness nausea or vomiting.  Denies current visual changes or weakness/numbness to extremities.  Her headaches have persisted and she currently rates a posterior headache/pain 5/10.  She does regularly see Dr. Rocha for migraines.  Patient received a Botox injection on  and plans to continue Botox injections every 3 months for headache treatment.  She has some bloodshot appearance to right eye following Botox injections which the patient states is also occurred only previous  Botox injection. Occipital nerve block is being considered if Botox is unsuccessful in resolution.  She was also given a sample of Ubrelvy, but has not taken this medication yet.  Patient is compliant with Eliquis 5 mg twice daily and I did discuss with the patient that she would likely need to be on this medication for 6 months to 1 year post stroke.  Tolerating atorvastatin 40 mg nightly without issue.  I discussed with the patient her current PFO discovered on echo.  She has not met with cardiology to discuss this and at this time patient has deferred an outpatient cardiology referral given the likelihood that the dissection and subsequent stroke was caused by chiropractic manipulation.  Patient states that she does regularly check her blood pressure and is regularly less than 130/90.  Today's blood pressure 132/78.  She currently sees Dr. Redman as primary care physician and will continue to follow-up for routine labs and blood pressure monitoring.  I discussed signs and symptoms of stroke with the patient and advised her to seek emergent treatment for any future strokelike symptoms.  Patient was agreeable to follow-up in stroke clinic in approximately 3 months.      Clinic visit 10/24/2024: Patient presents today with her .  She has been doing relatively well.  She denies any new stroke symptoms.  No current neurologic deficits.  She reports that her headaches have improved significantly since initiation of Botox with Dr. Rocha.  She reports 3-4 headaches a month that are responding well to Ubrelvy.  She reports compliance with her Eliquis and atorvastatin.  She has an appointment with cardiology coming up to further assess PFO.  Per review of EMR, she recently followed up with heme-onc with imaging.No evidence of malignancy was noted.  She reports that she is going to [place] for 6 weeks to visit family.  Encouraged her and her  to be aware of the closest medical facility should it be needed.   Dissection precautions discussed including avoiding activities that could result in cervical trauma or cervical manipulation.  Patient and her  verbalized understanding.    I have reviewed and the following portions of the patient's history were updated as appropriate: past family history, past medical history, past social history, past surgical history and problem list.    Medications:     Current Outpatient Medications:     apixaban (ELIQUIS) 5 MG tablet tablet, Take 1 tablet by mouth Every 12 (Twelve) Hours for 90 days. Indications: Vertebral artery dissections, Basilar artery dissection, R cerebellar infarct, Disp: 180 tablet, Rfl: 0    atorvastatin (LIPITOR) 40 MG tablet, Take 1 tablet by mouth Daily., Disp: 90 tablet, Rfl: 0    methylPREDNISolone (MEDROL) 4 MG dose pack, TAKE 1 ROW OF TABLETS BY MOUTH EACH DAY AS INSTRUCTED ON THE PACKAGE, Disp: , Rfl:     Multiple Vitamins-Minerals (HAIR SKIN AND NAILS FORMULA) tablet, Take  by mouth Daily., Disp: , Rfl:     nortriptyline (Pamelor) 50 MG capsule, Take 1 capsule by mouth Every Night., Disp: 90 capsule, Rfl: 3    Nurtec 75 MG dispersible tablet, DISSOLVE 1 TABLET IN MOUTH EVERY DAY AS NEEDED (at the onset of heADCHE, max of 75mg/ 24hours., Disp: , Rfl:     polyethylene glycol (MIRALAX) packet, Take 17 g by mouth As Needed., Disp: , Rfl:     ubrogepant (Ubrelvy) 100 MG tablet, Take one tablet at onset of migraine. May repeat in 2 hours if needed. Max of 2 tablets in 24 hours., Disp: 16 tablet, Rfl: 11    venlafaxine (EFFEXOR) 75 MG tablet, Take 1 tablet by mouth 2 (Two) Times a Day., Disp: 180 tablet, Rfl: 3    Zavzpret 10 MG/ACT solution, INSTILL 1 SPRAY INTO ONE NOSTRIL AS DIRECTED AND AS NEEDED AT THE ONSET OF HEADACHE. DO NOT USE MORE THAN ONCE A DAY., Disp: , Rfl:     Allergies:   Allergies   Allergen Reactions    Penicillins GI Intolerance       Objective     Physical Exam:   Vital Signs:   Vitals:    10/24/24 1054   BP: 126/78   Pulse: 118   Temp:  "98 °F (36.7 °C)   SpO2: 97%   Weight: 95.3 kg (210 lb)   Height: 167.6 cm (65.98\")     Body mass index is 33.91 kg/m².    Physical Exam  Constitutional:       General: She is not in acute distress.  HENT:      Head: Normocephalic and atraumatic.   Eyes:      Extraocular Movements: Extraocular movements intact.      Pupils: Pupils are equal, round, and reactive to light.   Cardiovascular:      Rate and Rhythm: Normal rate and regular rhythm.   Pulmonary:      Effort: Pulmonary effort is normal. No respiratory distress.   Abdominal:      General: There is no distension.      Palpations: Abdomen is soft.   Musculoskeletal:      Cervical back: Normal range of motion and neck supple.   Skin:     General: Skin is warm and dry.      Capillary Refill: Capillary refill takes less than 2 seconds.   Neurological:      General: No focal deficit present.      Mental Status: She is alert and oriented to person, place, and time. Mental status is at baseline.      Motor: Motor strength is normal.  Psychiatric:         Speech: Speech normal.       Neurological Exam  Mental Status  Alert. Oriented to person, place, time and situation. Oriented to person, place, and time. Speech is normal. Language is fluent with no aphasia.    Cranial Nerves  CN II: Visual acuity is normal. Visual fields full to confrontation.  CN III, IV, VI: Extraocular movements intact bilaterally. Pupils equal round and reactive to light bilaterally.  CN V: Facial sensation is normal.  CN VII: Full and symmetric facial movement.  CN IX, X: Palate elevates symmetrically  CN XI: Shoulder shrug strength is normal.  CN XII: Tongue midline without atrophy or fasciculations.    Motor   Strength is 5/5 throughout all four extremities.    Sensory  Light touch is normal in upper and lower extremities.     Coordination  Right: Finger-to-nose normal. Rapid alternating movement normal.Left: Finger-to-nose normal. Rapid alternating movement normal.    Gait  Casual gait is " normal including stance, stride, and arm swing.         NIH Stroke Scale  Person Administering Scale: SHARONA Drew    1a  Level of consciousness: 0=alert; keenly responsive   1b. LOC questions:  0=Answers both questions correctly   1c. LOC commands: 0=Performs both tasks correctly   2.  Best Gaze: 0=normal   3.  Visual: 0=No visual loss   4. Facial Palsy: 0=Normal symmetric movement   5a.  Motor left arm: 0=No drift, limb holds 90 (or 45) degrees for full 10 seconds   5b.  Motor right arm: 0=No drift, limb holds 90 (or 45) degrees for full 10 seconds   6a. motor left le=No drift, limb holds 90 (or 45) degrees for full 10 seconds   6b  Motor right le=No drift, limb holds 90 (or 45) degrees for full 10 seconds   7. Limb Ataxia: 0=Absent   8.  Sensory: 0=Normal; no sensory loss   9. Best Language:  0=No aphasia, normal   10. Dysarthria: 0=Normal   11. Extinction and Inattention: 0=No abnormality    Total:   0       MODIFIED HERBERT SCALE (to be assessed for each patient having history of stroke) []Stroke history but not assessed  []0: No symptoms at all  []1: No significant disability despite symptoms  []2: Slight disability  []3: Moderate disability  []4: Moderately severe disability  []5: Severe disability  []6: Death     Lipid Panel          2024    05:20   Lipid Panel   Total Cholesterol 186    Triglycerides 282    HDL Cholesterol 63    VLDL Cholesterol 45    LDL Cholesterol  78    LDL/HDL Ratio 1.06          Assessment / Plan      Assessment/Plan:   Diagnoses and all orders for this visit:    1. History of stroke (Primary)  - Continue Eliquis 5 mg twice daily.   - Continue atorvastatin 40 mg daily  - Follow-up with Dr. Rocha as recommended for migraine management  - Avoid activities that could cause cervical trauma, cervical manipulation given multiple areas of dissection  - Increase activity as tolerated  - BP goals less than 130/80  - Heart healthy diet  - Return to the ED with any  additional stroke symptoms    2. Vertebral artery dissection/Basilar dissection  - Continue Eliquis 5 mg twice daily  - Repeat CTA 6/2025 to assess dissections  - Will consider transitioning to antiplatelet therapy at that time  - Avoid activities that could cause cervical trauma, cervical manipulation given multiple areas of dissection    3. Hyperlipidemia LDL goal <70  - LDL 78 on 6/27/2024.  Goal less than 70 for secondary stroke prevention  - Continue atorvastatin 40 mg daily  - Heart healthy diet    4. PFO (patent foramen ovale)  - Currently on Eliquis  - Deferring cardiology appointment for now     Discussed the importance of medication compliance Atorvastatin 40mg nightly and Eliquis 5mg twice daily and lifestyle modifications adequate control of blood pressure, adequate control of cholesterol (goal LDL <70), adequate control of glucose (<140, A1c goal <7), increased physical activity, and implementation of healthy diet to help reduce the risk of future cerebrovascular events.  Also discussed the signs symptoms that would warrant the patient return back to the emergency department including unilateral weakness, unilateral numbness, visual disturbances, loss of balance, speech difficulties, and/or a sudden severe headache.  Patient verbalized understanding.     Follow Up:   Return in about 3 months (around 1/24/2025).    SHARONA Drew, AGACNP-[facility] Neuro Stroke

## 2024-11-07 ENCOUNTER — PATIENT MESSAGE (OUTPATIENT)
Dept: ONCOLOGY | Facility: CLINIC | Age: 44
End: 2024-11-07
Payer: COMMERCIAL

## 2024-12-30 ENCOUNTER — OFFICE VISIT (OUTPATIENT)
Dept: GYNECOLOGIC ONCOLOGY | Facility: CLINIC | Age: 44
End: 2024-12-30
Payer: COMMERCIAL

## 2024-12-30 VITALS
SYSTOLIC BLOOD PRESSURE: 135 MMHG | BODY MASS INDEX: 33.51 KG/M2 | HEART RATE: 76 BPM | TEMPERATURE: 97.1 F | WEIGHT: 208.5 LBS | DIASTOLIC BLOOD PRESSURE: 100 MMHG | HEIGHT: 66 IN | OXYGEN SATURATION: 99 % | RESPIRATION RATE: 17 BRPM

## 2024-12-30 DIAGNOSIS — Z15.09 LYNCH SYNDROME: ICD-10-CM

## 2024-12-30 DIAGNOSIS — Z01.419 WELL WOMAN EXAM WITH ROUTINE GYNECOLOGICAL EXAM: Primary | ICD-10-CM

## 2024-12-30 DIAGNOSIS — Z85.3 HISTORY OF LEFT BREAST CANCER: ICD-10-CM

## 2024-12-30 DIAGNOSIS — N95.2 VAGINAL ATROPHY: ICD-10-CM

## 2024-12-30 DIAGNOSIS — Z78.0 POST-MENOPAUSAL: ICD-10-CM

## 2024-12-30 DIAGNOSIS — N95.1 VASOMOTOR SYMPTOMS DUE TO MENOPAUSE: ICD-10-CM

## 2024-12-30 DIAGNOSIS — N95.8 GENITOURINARY SYNDROME OF MENOPAUSE: ICD-10-CM

## 2024-12-30 PROCEDURE — 99396 PREV VISIT EST AGE 40-64: CPT | Performed by: NURSE PRACTITIONER

## 2024-12-30 RX ORDER — VENLAFAXINE 75 MG/1
75 TABLET ORAL 2 TIMES DAILY
Qty: 180 TABLET | Refills: 3 | Status: SHIPPED | OUTPATIENT
Start: 2024-12-30

## 2024-12-30 NOTE — PROGRESS NOTES
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 year old female who is here today for an annual exam.  Last annual exam completed here by SHARONA Dia in 11/2023.  She has known Massey Syndrome, personal history of breast cancer, and endometriosis. She is s/p hysterectomy and later BSO. Left breast cancer was diagnosed in 2016: stage IA invasive ductal carcinoma, ER/AL negative, HER-2 positive. She is followed by Dr. Browne and SHARONA Rodarte of our medical oncology team. She underwent bilateral mastectomy with Dr. Johnson and completed her breast reconstruction with Dr. Massey in 3/2019.  She is followed by Dr. Dasilva for her GI Massey screenings and Dr. Butt of neurology for migraines and Massey screening. Colonoscopy and EGD completed in 8/2021, colonoscopy only with Dr Dasilva in 2/2024. She also completes annual skin checks that are UTD (scheduled for head to toe skin check summer 2025). Last UA completed in 6/26/2024.     Since her last visit she is now on Eliquis due to strokes over the summer.  Patient attributes this to her migraine medication she was on at the time.  As above, she is following with neurology.     She is happy with her Effexor for management of postmenopausal vasomotor symptoms,  requests refills.  Dose was increased last year due to worsening hot flashes and irritability.  Symptoms well-controlled now.    Endorses ongoing arthralgias and myalgias.  At recent [facility] onc follow-up in September 2024 restaging CTs with a chest, abdomen, and pelvis along with NM bone scan were completed in light of new rib and abdominal pain. Imaging showed degenerative changes on bone scan and chronic changes with CT chest but no evidence of malignancy.     She does also report recurrent UTIs and discomfort with intercourse as well as decreased libido.  Edna had premarked  symptoms for ROS section. I confirmed with her today that these are the recurrent sxs she experiences, but not having acute sxs at this time.     Cancer History:   Oncology/Hematology History Overview Note   1.  History of breast cancer, stage 1A,T1aN0, 2 mm Invasive ductal carcinoma with multifocal high-grade DCIS of the left breast. No sentinel node involvement out of 2. Curry Niño 7 out of 9. Estrogen and progesterone receptor 0 and HER-2/angie 100% 3+ positive.   -Completed 4 cycles of adjuvant therapy with Taxotere and Cytoxan 2/14/2017    2.  Massey syndrome  Surveillance recommendations for Massey syndrome:  ? Colorectal cancer screening with colonoscopy every 1-2 years.   Her last colonoscopy was 2/19/2024 with recommendation to repeat in 1-2 years  ? Gastric cancer screening with EGD every 2-3 years.  She had EGD 2/19/2024 with recommendation to repeat in 3 years  ? Urinary tract cancer screening with urinalysis annually.  This will be done with her next gynecological exam in December, current is negative from June 2024.  ? Skin cancer screening with annual dermatological skin exam.    ? Annual physical exam with neurological examination    3.  Migraines  4.  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  She presented to [facility] with sudden  onset dizziness, nausea, vomiting after chiropractic adjustment. CT head was negative for acute abnormality. 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  MRI detected multiple right cerebellar infarcts.  Neurosurgical intervention deferred given patient's low NIH.  She was started on a heparin drip and closely monitored throughout hospital stay and transition to Eliquis 5 mg twice daily upon discharge along with atorvastatin 40 mg nightly.  Patient was also discovered to have a PFO upon TTE evaluation.  It was not felt that the PFO was the source of the patient's acute stroke.    5.  Patent foramen ovale    -9/26/2024 CT chest, abdomen and pelvis Shows minimal left basilar discoid atelectasis.  Slight irregularity of the anterior left third rib, suggesting old healed rib fracture.  No directly visible fracture and no corresponding abnormality on concurrent bone scan.  No other evidence of active chest disease.  Abdomen and pelvis negative, no evidence of malignancy/metastasis.  -9/26/2024 total body bone scan shows low-level multifocal uptake in typical locations for osteoarthritis and almost unchanged from 11/10/2017 exam.  Decreasing activity associated with the patient's advanced L5-S1 degenerative disc disease.  No new or progressive abnormality suspicious for malignancy are seen.     Massey syndrome    Initial Diagnosis    Massey syndrome     12/1/2017 Procedure    Colonoscopy with Dr. Dasilva, negative other than for small internal hemorrhoid.  EGD normal.     Malignant neoplasm of lower-outer quadrant of left breast of female, estrogen receptor negative (Resolved)   8/19/2014 Surgery    Laparoscopic bilateral salpingo-oophorectomy.     9/22/2016 Initial Diagnosis    Left breast cancer     10/6/2016 Genetic Testing    Genetic testing was negative for deleterious mutations by sequencing and rearrangement testing for the genes on this panel  (TIFFANIE, BARD1, BRCA1, BRCA2, BRIP1, CDH1, CHEK2, MRE11A, MUTYH, NBN, NF1, PALB2, PTEN, RAD50, RAD51C, RAD51D, and TP53)      11/1/2016 Surgery    Bilateral mastectomy and sentinel node sampling.     12/6/2016 - 2/14/2017 Chemotherapy    OP BREAST TC DOCEtaxel / Cyclophosphamide       11/10/2017 Imaging    Total body bone scan IMPRESSION:  Abnormal activity seen in the uptake of tracer within the S1  level concerning for metastatic process. No fractures seen on flexion  and extension imaging with no definite change in densities to suggest a  metastatic lesion. Continued follow-up is recommended.     12/6/2017 Imaging    PET/CT IMPRESSION:  1.  Supraclavicular pathologic adenopathy is noted with SUV values which  are at the malignant threshold as described above.  2.  Two worrisome small internal mammary lymph nodes which have a  relatively low level hypermetabolic activity but are clearly abnormal  for internal mammary chain nodes.  3.  Slight increased tracer activity identified over the chest wall  anterior and leftward over the soft tissues of the rib cage which is  likely posttreatment or posttraumatic.  4.  No other distant metastatic disease is identified elsewhere.  Abdomen, pelvis, retroperitoneum, liver and adrenal glands are  unremarkable.  5.  Even though the SUV values are not impressive, the right  supraclavicular lymph nodes of which there are two, and the right  internal mammary chain lymph nodes of which there are two and these are  enlarged for internal mammary lymph nodes exhibit enough hypermetabolic  activity to highly suspect early metastatic disease in these areas.     12/22/2017 Biopsy    Right supraclavicular lymph node excisional biopsy, pathology negative:    Clinical Information See result below   The working history is malignant neoplasm of breast.   Final Diagnosis   SUPRACLAVICULAR LYMPH NODE:  Reactive lymphoid hyperplasia.  Negative for primary and metastatic neoplasm.     JFJ/mbc     Electronically signed by Homero Beyer MD on 2017 at 1618          2019 Imaging    PET/CT IMPRESSION:  Interval improvement seen in the examination were two  previous right supraclavicular lymph nodes were identified and today  only one lymph node remains improved in both size and activity in the  Interval.  MRI Lumbar spine IMPRESSION:  At L5-S1 there are degenerative bony changes and  degenerative changes related to the intervertebral discs. Furthermore,  there is a right paracentral disc protrusion at L5-S1. There is no  evidence of metastatic disease.     2019 Imaging    CT chest IMPRESSION:  Postsurgical changes from bilateral breast implantation and  meniscectomy without bulky axillary adenopathy or soft tissue  mass/abnormality associated with the marked area of interest left  lateral chest wall soft tissues. No acute intrathoracic process.         Obstetric History:  OB History          2    Para   2    Term   2            AB        Living             SAB        IAB        Ectopic        Molar        Multiple        Live Births                   Menstrual History:     No LMP recorded. Patient has had a hysterectomy.          The current medication list and allergy list were reviewed and reconciled.     Past Medical History, Past Surgical History, Social History, Family History have been reviewed and are without significant changes except as mentioned.    Health Maintenance:  see EMR.  -Last mmg: breast imaging N/A now s/p bilateral mastectomy with reconstruction  -Last DEXA 23: Osteopenia of the L1-L4 vertebrae.   -Last pap smear 2016 negative. No longer needed, now s/p hysterectomy/ BSO  -Last colonoscopy by Dr Dasilva 24.   -Last EGD 24, repeat 3 yrs.  -Last screening UA: 2024    Review of Systems   Constitutional:  Negative for chills.   HENT:          +c/o dry mouth   Genitourinary:  Positive for dysuria, flank pain, frequency and urgency. Negative for  "hematuria.         Objective   Physical Exam  Vital Signs: /100   Pulse 76   Temp 97.1 °F (36.2 °C) (Temporal)   Resp 17   Ht 167.6 cm (65.98\")   Wt 94.6 kg (208 lb 8 oz)   SpO2 99%   BMI 33.67 kg/m²   Vitals:    12/30/24 1035   PainSc: 0-No pain           General Appearance:  alert, cooperative, no apparent distress, appears stated age, and obese by BMI criteria   Neurologic/Psych: A&O x 3, gait steady, appropriate affect   Lungs:   Clear to auscultation bilaterally; respirations regular, even, and unlabored bilaterally   Heart:  Regular rate and rhythm, no murmurs appreciated   Breasts:  Surgically absent with implants bilaterally.  No chest wall masses or axillary/supraclavicular adenopathy.   Abdomen:   Soft, non-tender, non-distended, and no organomegaly   Lymph nodes: No cervical, supraclavicular, inguinal or axillary adenopathy noted   Extremities: Normal, atraumatic; no clubbing, cyanosis, or edema    Skin: No rashes, ulcers, or suspicious lesions noted   Pelvic: External Genitalia  without lesions or skin changes  Vagina  is pink, moist, without lesions.   Vaginal Cuff  Female Vaginal Cuff: smooth, intact and without visible lesions  Uterus  surgically absent  Ovaries  surgically absent bilaterally  Parametria  smooth  Rectovaginal  Female rectovaginal: deferred     ECOG score: 0             Result Review :       Tumor marker:     Date Value Ref Range Status   06/16/2014 6.1 Units/mL Final     Comment:     DF by IF @ 06/16/2014 14:41   results should be interpreted inconjunction with all clinical information derived from other diagnostic tests, physical examination, and full medical history.   assay values greater than or equal to 35 Units/mL may be found in 1-2% of healthy individuals and in patients with nonmalignant conditions such as pericarditis, cirrhosis, severe hepatic necrosis, endometriosis (Stages II-IV), first trimester   pregnancy, and ovarian cysts or in patients " with nonovarian malignancies.  A  assay value below 35 Units/mL does not indicate the absence of residual ovarian cancer because patients with histopathologic evidence of ovarian carcinoma may have  ASSAY VALUES within the range of healthy individuals.  Clinical decisions should not be based on a  assay value below 35 Units/mL.         PHQ-9 Total Score:      Procedure Note:          Assessment and Plan:      -Keep appointments for neurology follow-up with Dr. Butt.   -F/u with dermatology this summer as scheduled.  -Continue GI Massey screenings with Dr. Dasilva     Diagnoses and all orders for this visit:    1. Well woman exam with routine gynecological exam (Primary)  -Repeat colonoscopy 2/[id number]  -Repeat DEXA due 7/2025. Counseled on importance of routine physical activity, vitamin D, and adequate dietary calcium to promote strong bones. Order placed.  -no pap smear needed today     2. Vasomotor symptoms due to menopause  -Well-controlled, continue Effexor as prescribed.  Refill sent.  -     venlafaxine (EFFEXOR) 75 MG tablet; Take 1 tablet by mouth 2 (Two) Times a Day.  Dispense: 180 tablet; Refill: 3    3. Massey syndrome  -Repeat colonoscopy in 2026/2026  -Repeat EGD due 2/2027  -Repeat UA in 1 year    4. History of left breast cancer  -Follow-up with Dr. Browne and Willow as scheduled    5. Genitourinary syndrome of menopause  6. Vaginal atrophy  -Recommended vaginal lubricant before intercourse  -Recommended vaginal conditioner 2-3 nights per week.  Sample of halogen given.  Breast cancer was not hormone positive, but preferred to try hyaluronic acid product before vaginal estradiol cream.  -Discussed importance of being seen for evaluation of any acute sxs in real time to allow for exam, and appropriate testing to differentiate between vaginal yeast infection vs UTI vs other so the correct tx can be initiated.  Edna does feel there is a correlation between intercourse and subsequent  UTI.  Encouraged to avoid after sexual encounter.    Patient education regarding Massey Screenings (s/p hyst/BSO):  Colonoscopy every 1-2 years beginning at age 20-25.  EGD with extended duodenoscopy at 2-3 year intervals starting at age 30-35.  Capsule endoscopy for small bowel cancers every 2-3 years starting at age 30-35.  Annual urinalysis.  Annual physical exam to evaluate for neurologic changes or other abnormalities.        Pain assessment was performed today as a part of patient’s care. For patients with pain related to surgery, gynecologic malignancy or cancer treatment, the plan is as noted in the assessment/plan.  For patients with pain not related to these issues, they are to seek any further needed care from a more appropriate provider, such as PCP.      Follow-up:     Return to clinic in 1 year for Annual exam.      Electronically signed by SHARONA Trevino on 12/30/2024

## 2024-12-30 NOTE — TELEPHONE ENCOUNTER
Rx Refill Note  Requested Prescriptions     Pending Prescriptions Disp Refills    apixaban (ELIQUIS) 5 MG tablet tablet 60 tablet 0     Sig: Take 1 tablet by mouth 2 (Two) Times a Day.      Last office visit with prescribing clinician: 10/24/24  Last telemedicine visit with prescribing clinician: Visit date not found   Next office visit with prescribing clinician: 1/23/25  Zeina Burton MA  12/30/24, 11:25 EST

## 2025-01-17 ENCOUNTER — PROCEDURE VISIT (OUTPATIENT)
Dept: NEUROLOGY | Facility: CLINIC | Age: 45
End: 2025-01-17
Payer: COMMERCIAL

## 2025-01-17 DIAGNOSIS — G43.019 INTRACTABLE MIGRAINE WITHOUT AURA AND WITHOUT STATUS MIGRAINOSUS: Primary | ICD-10-CM

## 2025-01-17 NOTE — PROGRESS NOTES
Botox Procedure Note-    Current headache frequency: 2-3__ headaches days / month      The risks and benefits were discussed with the patient. The patient was given the opportunity to ask questions. Informed consent form was signed.     Onabotulinumtoxin A was reconstituted with 0.9% normal saline. All injections sites were prepped with alcohol swab. Approximately 5 units of botox were injected into each of the following sites  as per routine migraine botox injection PREEMPT protocol:  (2 injections), procerus (1 injection), frontalis (4 injections), temporalis (8 injections), occipitalis (6 injections) cervical, upper cervical paraspinals (4 injections),  trapezius (6 injections) and masseters (4 injections) for a total of 35 sites.  The patient tolerated the procedure well. There were no immediate complications. The patient will follow up in approximately 12 weeks for her next injection.     Total amount of onabotulinumtoxin A injected was 175 units with 35 units wasted.     Additional notes: Patient states that her headaches are typically well-controlled up until 2 weeks prior to her Botox.  She continues to take nortriptyline 50 mg nightly that is also helping with her sleep as well as Ubrelvy as needed.  She also feels that her jaw shots helped relax her jaw tremendously at the last visit as she grinds her teeth at night for which she wears a mouthguard.  With regards to her stroke/vertebral and basilar artery dissection she needs to be on Eliquis for a year as per stroke neurology.  Personal history-works on a computer.  Has 2 girls ages 18 and 16

## 2025-01-22 RX ORDER — APIXABAN 5 MG/1
5 TABLET, FILM COATED ORAL 2 TIMES DAILY
Qty: 60 TABLET | Refills: 5 | Status: SHIPPED | OUTPATIENT
Start: 2025-01-22

## 2025-01-22 NOTE — TELEPHONE ENCOUNTER
Rx Refill Note  Requested Prescriptions     Pending Prescriptions Disp Refills    Eliquis 5 MG tablet tablet [Pharmacy Med Name: Eliquis Oral Tablet 5 MG] 60 tablet 0     Sig: TAKE 1 TABLET BY MOUTH 2 TIMES A DAY      Last office visit with prescribing clinician: 10/24/24  Last telemedicine visit with prescribing clinician: Visit date not found   Next office visit with prescribing clinician: 1/23/25  Zeina Burton MA  01/22/25, 15:38 EST

## 2025-01-23 ENCOUNTER — OFFICE VISIT (OUTPATIENT)
Dept: NEUROLOGY | Facility: CLINIC | Age: 45
End: 2025-01-23
Payer: COMMERCIAL

## 2025-01-23 VITALS
WEIGHT: 206 LBS | BODY MASS INDEX: 33.11 KG/M2 | SYSTOLIC BLOOD PRESSURE: 128 MMHG | TEMPERATURE: 97 F | OXYGEN SATURATION: 98 % | HEART RATE: 97 BPM | DIASTOLIC BLOOD PRESSURE: 70 MMHG | HEIGHT: 66 IN

## 2025-01-23 DIAGNOSIS — I77.74 VERTEBRAL ARTERY DISSECTION: Primary | ICD-10-CM

## 2025-01-23 NOTE — PROGRESS NOTES
Follow Up Office Visit      Encounter Date: 2025   Patient Name: Edna Motley  : 1980   MRN: [id number]   PCP: Dru Aguirre DO    Chief Complaint:    Chief Complaint   Patient presents with    Follow-up       History of Present Illness: Edna Motley is a 44 y.o. female who is here today to follow up with stroke.     Edna Motley is a 43 y.o. female with known medical diagnoses of  migraines (followed by Dr. Rocha), Massey syndrome, breast cancer, CTS, and depression who presented to [facility] on 24 with sudden onset dizziness, nausea, vomiting after chiropractic adjustment.  NIH 1. stroke neurology was consulted for further evaluation .  CT head was negative for acute abnormality. 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  MRI detected multiple right cerebellar infarcts.  Neurosurgical intervention deferred given patient's low NIH.  She was started on a heparin drip and closely monitored throughout hospital stay and transition to Eliquis 5 mg twice daily upon discharge along with atorvastatin 40 mg nightly.  Patient was also discovered to have a PFO upon TTE evaluation.  It was not felt that the PFO was the source of the patient's acute stroke.       Clinic visit 2024: Patient presents today as a 1 month hospital follow-up appointment.  She has no current symptoms of dizziness nausea or vomiting.  Denies current visual changes or weakness/numbness to extremities.  Her headaches have persisted and she currently rates a posterior headache/pain 5/10.  She does regularly see Dr. Rocha for migraines.  Patient received a Botox injection on  and plans to continue Botox injections every 3 months for headache treatment.  She has some bloodshot appearance to right eye following Botox injections which the patient states is also occurred only previous Botox injection. Occipital nerve block is being  Problem: Venous Thromboembolism (VTW)/Deep Vein Thrombosis (DVT) Prevention:  Goal: Patient will participate in Venous Thrombosis (VTE)/Deep Vein Thrombosis (DVT)Prevention Measures  mech and chem prophylaxis in place    Problem: Pain Management  Goal: Pain level will decrease to patient's comfort goal  Pain assessed. Medicated per MAR with + results.         considered if Botox is unsuccessful in resolution.  She was also given a sample of Ubrelvy, but has not taken this medication yet.  Patient is compliant with Eliquis 5 mg twice daily and I did discuss with the patient that she would likely need to be on this medication for 6 months to 1 year post stroke.  Tolerating atorvastatin 40 mg nightly without issue.  I discussed with the patient her current PFO discovered on echo.  She has not met with cardiology to discuss this and at this time patient has deferred an outpatient cardiology referral given the likelihood that the dissection and subsequent stroke was caused by chiropractic manipulation.  Patient states that she does regularly check her blood pressure and is regularly less than 130/90.  Today's blood pressure 132/78.  She currently sees Dr. Redman as primary care physician and will continue to follow-up for routine labs and blood pressure monitoring.  I discussed signs and symptoms of stroke with the patient and advised her to seek emergent treatment for any future strokelike symptoms.  Patient was agreeable to follow-up in stroke clinic in approximately 3 months.      Clinic visit 10/24/2024: Patient presents today with her .  She has been doing relatively well.  She denies any new stroke symptoms.  No current neurologic deficits.  She reports that her headaches have improved significantly since initiation of Botox with Dr. Rocha.  She reports 3-4 headaches a month that are responding well to Ubrelvy.  She reports compliance with her Eliquis and atorvastatin.  She has an appointment with cardiology coming up to further assess PFO.  Per review of EMR, she recently followed up with heme-onc with imaging.No evidence of malignancy was noted.  She reports that she is going to [place] for 6 weeks to visit family.  Encouraged her and her  to be aware of the closest medical facility should it be needed.  Dissection precautions discussed including  avoiding activities that could result in cervical trauma or cervical manipulation.  Patient and her  verbalized understanding.     Clinic visit 1/23/2025: Patient presents for routine evaluation. She denies any new or worsening stroke like symptoms. She has been taking her medications without issues or side effects.  Patient has been following with Dr. Rocha for migraine management and reports that she has seen great improvement.  The plan is for patient to follow-up at the 1 year yinka for repeat CTA of the head and neck to evaluate status of dissections.  At that time if her dissections are stable or resolved we will transition from Eliquis to possibly aspirin.    Subjective        I have reviewed and the following portions of the patient's history were updated as appropriate: past family history, past medical history, past social history, past surgical history and problem list.    Medications:     Current Outpatient Medications:     apixaban (Eliquis) 5 MG tablet tablet, TAKE 1 TABLET BY MOUTH 2 TIMES A DAY, Disp: 60 tablet, Rfl: 5    atorvastatin (LIPITOR) 40 MG tablet, Take 1 tablet by mouth Daily., Disp: 90 tablet, Rfl: 0    Multiple Vitamins-Minerals (HAIR SKIN AND NAILS FORMULA) tablet, Take  by mouth Daily., Disp: , Rfl:     nortriptyline (Pamelor) 50 MG capsule, Take 1 capsule by mouth Every Night., Disp: 90 capsule, Rfl: 3    Nurtec 75 MG dispersible tablet, DISSOLVE 1 TABLET IN MOUTH EVERY DAY AS NEEDED (at the onset of heADCHE, max of 75mg/ 24hours., Disp: , Rfl:     polyethylene glycol (MIRALAX) packet, Take 17 g by mouth As Needed., Disp: , Rfl:     ubrogepant (Ubrelvy) 100 MG tablet, Take one tablet at onset of migraine. May repeat in 2 hours if needed. Max of 2 tablets in 24 hours., Disp: 16 tablet, Rfl: 11    venlafaxine (EFFEXOR) 75 MG tablet, Take 1 tablet by mouth 2 (Two) Times a Day., Disp: 180 tablet, Rfl: 3    Allergies:   Allergies   Allergen Reactions    Penicillins GI Intolerance  "      Objective     Physical Exam:   Vital Signs:   Vitals:    01/23/25 1006   BP: 128/70   Pulse: 97   Temp: 97 °F (36.1 °C)   SpO2: 98%   Weight: 93.4 kg (206 lb)   Height: 167.6 cm (65.98\")     Body mass index is 33.27 kg/m².    Physical Exam  Vitals and nursing note reviewed.   Constitutional:       General: She is not in acute distress.     Appearance: Normal appearance. She is obese. She is not ill-appearing.   HENT:      Head: Normocephalic and atraumatic.      Nose: Nose normal.      Mouth/Throat:      Mouth: Mucous membranes are moist.   Eyes:      Extraocular Movements: Extraocular movements intact.      Pupils: Pupils are equal, round, and reactive to light.   Cardiovascular:      Rate and Rhythm: Normal rate and regular rhythm.      Pulses: Normal pulses.   Pulmonary:      Effort: Pulmonary effort is normal. No respiratory distress.   Skin:     General: Skin is warm and dry.   Neurological:      General: No focal deficit present.      Mental Status: She is alert and oriented to person, place, and time. Mental status is at baseline.   Psychiatric:         Mood and Affect: Mood normal.         Behavior: Behavior normal.         Modified Giovanny Score: 0        0  No Symptoms    1 No significant disability. Able to carry out all usual activities, despite some symptoms.    2 Slight disability. Able to look after own affairs without assistance, but unable to carry out all previous activities.    3 Moderate disability. Requires some help, but able to walk unassisted.    4 Moderately severe disability. Unable to attend to own bodily needs without assistance, and unable to walk unassisted.    5 Severe disability. Requires constant nursing care and attention, bedridden, incontinent.    6 Dead      PHQ-9 Depression Screening  Little interest or pleasure in doing things? Not at all   Feeling down, depressed, or hopeless? Not at all   PHQ-2 Total Score 0   Trouble falling or staying asleep, or sleeping too much?   "   Feeling tired or having little energy?     Poor appetite or overeating?     Feeling bad about yourself - or that you are a failure or have let yourself or your family down?     Trouble concentrating on things, such as reading the newspaper or watching television?     Moving or speaking so slowly that other people could have noticed? Or the opposite - being so fidgety or restless that you have been moving around a lot more than usual?     Thoughts that you would be better off dead, or of hurting yourself in some way?     PHQ-9 Total Score     If you checked off any problems, how difficult have these problems made it for you to do your work, take care of things at home, or get along with other people?          Hemoglobin   Date Value Ref Range Status   06/29/2024 15.8 12.0 - 15.9 g/dL Final     Hematocrit   Date Value Ref Range Status   06/29/2024 46.8 (H) 34.0 - 46.6 % Final     Platelets   Date Value Ref Range Status   06/29/2024 241 140 - 450 10*3/mm3 Final     Hemoglobin A1C   Date Value Ref Range Status   06/27/2024 5.20 4.80 - 5.60 % Final     LDL Cholesterol    Date Value Ref Range Status   06/27/2024 78 0 - 100 mg/dL Final     AST (SGOT)   Date Value Ref Range Status   06/27/2024 15 1 - 32 U/L Final     ALT (SGPT)   Date Value Ref Range Status   06/27/2024 23 1 - 33 U/L Final         Assessment / Plan      Assessment/Plan:   Diagnoses and all orders for this visit:     1. History of stroke (Primary)  - Continue Eliquis 5 mg twice daily until 1 year CTA follow up. Will determine plan moving forward after review.   - Continue atorvastatin 40 mg daily  - Follow-up with Dr. Rocha as recommended for migraine management. She has noted great improvement.   - Avoid activities that could cause cervical trauma, cervical manipulation given multiple areas of dissection  - Increase activity as tolerated  - BP goals less than 130/80  - Heart healthy diet  - Return to the ED with any additional stroke symptoms  -Follow up  in stroke clinic in June after new imaging.      2. Vertebral artery dissection/Basilar dissection  - Continue Eliquis 5 mg twice daily until 1 year CTA follow up. Will determine plan moving forward after review.   - Repeat CTA 6/2025 to assess dissections. Orders placed today.   - Will consider transitioning to antiplatelet therapy at that time  - Avoid activities that could cause cervical trauma, cervical manipulation given multiple areas of dissection     3. Hyperlipidemia LDL goal <70  - LDL 78.  Goal less than 70 for secondary stroke prevention.  - Continue atorvastatin 40 mg daily.     4. PFO (patent foramen ovale)  - Currently on Eliquis  - Deferring cardiology appointment for now     Discussed the importance of medication compliance Atorvastatin 40mg nightly and Eliquis 5mg twice daily and lifestyle modifications adequate control of blood pressure, adequate control of cholesterol (goal LDL <70), adequate control of glucose (<140, A1c goal <7), increased physical activity, and implementation of healthy diet to help reduce the risk of future cerebrovascular events.  Also discussed the signs symptoms that would warrant the patient return back to the emergency department including unilateral weakness, unilateral numbness, visual disturbances, loss of balance, speech difficulties, and/or a sudden severe headache.  Patient verbalized understanding.  Follow Up:   Return in about 5 months (around 6/30/2025).    SHARONA Villavicencio  [facility]

## 2025-02-24 ENCOUNTER — TELEPHONE (OUTPATIENT)
Dept: GYNECOLOGIC ONCOLOGY | Facility: CLINIC | Age: 45
End: 2025-02-24
Payer: COMMERCIAL

## 2025-02-24 NOTE — TELEPHONE ENCOUNTER
RN called pt regarding her MyChart message.  RN inquired about C/O vaginal pain and symptoms.  Pt states provider knows her history with vag infections and UTIs.  RN advised I have looked at her chart/history and it is best she comes in for an exam.  Pt verbalized understanding and appreciative of call.  Pt transferred to scheduling for appt.

## 2025-02-25 ENCOUNTER — LAB (OUTPATIENT)
Dept: LAB | Facility: HOSPITAL | Age: 45
End: 2025-02-25
Payer: COMMERCIAL

## 2025-02-25 ENCOUNTER — OFFICE VISIT (OUTPATIENT)
Dept: GYNECOLOGIC ONCOLOGY | Facility: CLINIC | Age: 45
End: 2025-02-25
Payer: COMMERCIAL

## 2025-02-25 VITALS
TEMPERATURE: 97.2 F | WEIGHT: 211 LBS | DIASTOLIC BLOOD PRESSURE: 80 MMHG | OXYGEN SATURATION: 98 % | BODY MASS INDEX: 33.91 KG/M2 | HEART RATE: 74 BPM | HEIGHT: 66 IN | SYSTOLIC BLOOD PRESSURE: 123 MMHG

## 2025-02-25 DIAGNOSIS — N76.0 ACUTE VAGINITIS: Primary | ICD-10-CM

## 2025-02-25 DIAGNOSIS — N76.0 ACUTE VAGINITIS: ICD-10-CM

## 2025-02-25 LAB
BACTERIA UR QL AUTO: ABNORMAL /HPF
BILIRUB UR QL STRIP: NEGATIVE
CLARITY UR: CLEAR
COLOR UR: YELLOW
GLUCOSE UR STRIP-MCNC: NEGATIVE MG/DL
HGB UR QL STRIP.AUTO: NEGATIVE
HYALINE CASTS UR QL AUTO: ABNORMAL /LPF
KETONES UR QL STRIP: NEGATIVE
LEUKOCYTE ESTERASE UR QL STRIP.AUTO: ABNORMAL
NITRITE UR QL STRIP: NEGATIVE
PH UR STRIP.AUTO: 7.5 [PH] (ref 5–8)
PROT UR QL STRIP: NEGATIVE
RBC # UR STRIP: ABNORMAL /HPF
REF LAB TEST METHOD: ABNORMAL
SP GR UR STRIP: 1.01 (ref 1–1.03)
SQUAMOUS #/AREA URNS HPF: ABNORMAL /HPF
UROBILINOGEN UR QL STRIP: ABNORMAL
WBC # UR STRIP: ABNORMAL /HPF

## 2025-02-25 PROCEDURE — 99214 OFFICE O/P EST MOD 30 MIN: CPT | Performed by: NURSE PRACTITIONER

## 2025-02-25 PROCEDURE — 81001 URINALYSIS AUTO W/SCOPE: CPT

## 2025-02-25 RX ORDER — AZITHROMYCIN 250 MG/1
TABLET, FILM COATED ORAL
COMMUNITY
Start: 2025-02-24

## 2025-02-25 RX ORDER — HYDROCODONE BITARTRATE AND ACETAMINOPHEN 5; 325 MG/1; MG/1
TABLET ORAL
COMMUNITY
Start: 2025-02-24

## 2025-02-25 NOTE — PROGRESS NOTES
GYN ONCOLOGY FOLLOW-UP    Edna Motley  [id number]  1980    Subjective   Chief Complaint: Vaginal burning with odor of urine      History of present illness:    Edna Motley is a 44 y.o. year old female who is here today for an acute visit and evaluation of recurrent symptoms.  She is known to this office and last seen in December for a well woman exam.  She has known Massey syndrome, personal history of breast cancer, and endometriosis.  She is status post hysterectomy and later BSO.  Continues Effexor for management of postmenopausal vasomotor symptoms.    At her last visit in December we discussed recurrent UTIs, low libido, and discomfort with intercourse. She was not having any active sxs at that time. I recommended vaginal lubricant before intercourse as well as vaginal conditioner in 2-3 nights per week which today she confirms that she has been compliant with. Instructed to return for evaluation of any acute symptoms in real-time to allow for appropriate exam and testing. She contacted us over the weekend reporting a lot of pain in general in the vaginal area and requesting a urine check. Patient does still feel that there is a correlation between intercourse and subsequent UTI. Onset ~1w ago. Feels extra dry. Foul odor to urine. No dysuria. Pain in perineal area-- maybe vagina. Describes a cold pack would give relief. No fever/ chills. No bleeding or significant discharge. Of note, yesterday she started an abx in preparation for upcoming dental surgery.     Oncology History:    Oncology/Hematology History Overview Note   1.  History of breast cancer, stage 1A,T1aN0, 2 mm Invasive ductal carcinoma with multifocal high-grade DCIS of the left breast. No sentinel node involvement out of 2. Curry [place]ño 7 out of 9. Estrogen and progesterone receptor 0 and HER-2/angie 100% 3+ positive.   -Completed 4 cycles of adjuvant therapy with Taxotere and Cytoxan 2/14/2017    2.  Massey syndrome  Surveillance  recommendations for Massey syndrome:  ? Colorectal cancer screening with colonoscopy every 1-2 years.   Her last colonoscopy was 2/19/2024 with recommendation to repeat in 1-2 years  ? Gastric cancer screening with EGD every 2-3 years.  She had EGD 2/19/2024 with recommendation to repeat in 3 years  ? Urinary tract cancer screening with urinalysis annually.  This will be done with her next gynecological exam in December, current is negative from June 2024.  ? Skin cancer screening with annual dermatological skin exam.    ? Annual physical exam with neurological examination    3.  Migraines  4.  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  She presented to [facility] 6/26/2024 with sudden onset dizziness, nausea, vomiting after chiropractic adjustment. CT head was negative for acute abnormality. 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  MRI detected multiple right cerebellar infarcts.  Neurosurgical intervention deferred given patient's low NIH.  She was started on a heparin drip and closely monitored throughout hospital stay and transition to Eliquis 5 mg twice daily upon discharge along with atorvastatin 40 mg nightly.  Patient was also discovered to have a PFO upon TTE evaluation.  It was not felt that the PFO was the source of the patient's acute stroke.    5.  Patent foramen ovale    -9/26/2024 CT chest, abdomen and pelvis Shows minimal left basilar discoid atelectasis.  Slight irregularity of the anterior left third rib, suggesting old healed rib fracture.  No directly visible fracture and no corresponding abnormality on concurrent bone scan.  No other evidence of active chest disease.  Abdomen and pelvis negative, no evidence of malignancy/metastasis.  -9/26/2024 total body bone scan shows low-level multifocal uptake in typical locations for osteoarthritis  and almost unchanged from 11/10/2017 exam.  Decreasing activity associated with the patient's advanced L5-S1 degenerative disc disease.  No new or progressive abnormality suspicious for malignancy are seen.     Massey syndrome    Initial Diagnosis    Massey syndrome     12/1/2017 Procedure    Colonoscopy with Dr. Dasilva, negative other than for small internal hemorrhoid.  EGD normal.     Malignant neoplasm of lower-outer quadrant of left breast of female, estrogen receptor negative (Resolved)   8/19/2014 Surgery    Laparoscopic bilateral salpingo-oophorectomy.     9/22/2016 Initial Diagnosis    Left breast cancer     10/6/2016 Genetic Testing    Genetic testing was negative for deleterious mutations by sequencing and rearrangement testing for the genes on this panel (TIFFANIE, BARD1, BRCA1, BRCA2, BRIP1, CDH1, CHEK2, MRE11A, MUTYH, NBN, NF1, PALB2, PTEN, RAD50, RAD51C, RAD51D, and TP53)      11/1/2016 Surgery    Bilateral mastectomy and sentinel node sampling.     12/6/2016 - 2/14/2017 Chemotherapy    OP BREAST TC DOCEtaxel / Cyclophosphamide       11/10/2017 Imaging    Total body bone scan IMPRESSION:  Abnormal activity seen in the uptake of tracer within the S1  level concerning for metastatic process. No fractures seen on flexion  and extension imaging with no definite change in densities to suggest a  metastatic lesion. Continued follow-up is recommended.     12/6/2017 Imaging    PET/CT IMPRESSION:  1.  Supraclavicular pathologic adenopathy is noted with SUV values which  are at the malignant threshold as described above.  2.  Two worrisome small internal mammary lymph nodes which have a  relatively low level hypermetabolic activity but are clearly abnormal  for internal mammary chain nodes.  3.  Slight increased tracer activity identified over the chest wall  anterior and leftward over the soft tissues of the rib cage which is  likely posttreatment or posttraumatic.  4.  No other distant metastatic disease is  identified elsewhere.  Abdomen, pelvis, retroperitoneum, liver and adrenal glands are  unremarkable.  5.  Even though the SUV values are not impressive, the right  supraclavicular lymph nodes of which there are two, and the right  internal mammary chain lymph nodes of which there are two and these are  enlarged for internal mammary lymph nodes exhibit enough hypermetabolic  activity to highly suspect early metastatic disease in these areas.     12/22/2017 Biopsy    Right supraclavicular lymph node excisional biopsy, pathology negative:    Clinical Information See result below   The working history is malignant neoplasm of breast.   Final Diagnosis   SUPRACLAVICULAR LYMPH NODE:  Reactive lymphoid hyperplasia.  Negative for primary and metastatic neoplasm.     JFJ/mbc    Electronically signed by Homero Beyer MD on 12/26/2017 at 1618          2/11/2019 Imaging    PET/CT IMPRESSION:  Interval improvement seen in the examination were two  previous right supraclavicular lymph nodes were identified and today  only one lymph node remains improved in both size and activity in the  Interval.  MRI Lumbar spine IMPRESSION:  At L5-S1 there are degenerative bony changes and  degenerative changes related to the intervertebral discs. Furthermore,  there is a right paracentral disc protrusion at L5-S1. There is no  evidence of metastatic disease.     12/27/2019 Imaging    CT chest IMPRESSION:  Postsurgical changes from bilateral breast implantation and  meniscectomy without bulky axillary adenopathy or soft tissue  mass/abnormality associated with the marked area of interest left  lateral chest wall soft tissues. No acute intrathoracic process.           The current medication list and allergy list were reviewed and reconciled.     Past Medical History, Past Surgical History, Social History, Family History have been reviewed and are without significant changes except as mentioned.      Review of Systems   Constitutional: Negative.  "   Gastrointestinal: Negative.    Genitourinary:  Positive for vaginal discharge and vaginal pain. Negative for decreased urine volume, difficulty urinating, dysuria, flank pain, frequency, genital sores, hematuria, pelvic pain, pelvic pressure, urgency, urinary incontinence and vaginal bleeding.   Psychiatric/Behavioral: Negative.         Objective   Physical Exam  Vital Signs: /80   Pulse 74   Temp 97.2 °F (36.2 °C) (Infrared)   Ht 167.6 cm (66\")   Wt 95.7 kg (211 lb)   SpO2 98%   BMI 34.06 kg/m²   There were no vitals filed for this visit.        General Appearance:  alert, cooperative, no apparent distress, appears stated age, and obese by BMI criteria   Neurologic/Psych: A&O x 3, gait steady, appropriate affect   HEENT:  Normocephalic, without obvious abnormality, mucous membranes moist   Breasts:  deferred   Abdomen:   Soft, non-tender, non-distended, and no organomegaly   Lymph nodes: No cervical, supraclavicular, inguinal or axillary adenopathy noted   Extremities: Normal, atraumatic; no clubbing, cyanosis, or edema    Pelvic: External genitalia are free from lesion.  On speculum examination, slight erythema noted uniformly across all vaginal tissue. There is no bleeding. Vagina is dry. A thick white discharge is present. The vaginal cuff was intact and no lesions were appreciated.  There was no significant tenderness.  The rectovaginal exam was deferred.                       Result Review :  -Last UA from 8m ago notable for 2+ protein    Tumor marker:     Date Value Ref Range Status   06/16/2014 6.1 Units/mL Final     Comment:     DF by IF @ 06/16/2014 14:41   results should be interpreted inconjunction with all clinical information derived from other diagnostic tests, physical examination, and full medical history.   assay values greater than or equal to 35 Units/mL may be found in 1-2% of healthy individuals and in patients with nonmalignant conditions such as pericarditis, " cirrhosis, severe hepatic necrosis, endometriosis (Stages II-IV), first trimester   pregnancy, and ovarian cysts or in patients with nonovarian malignancies.  A  assay value below 35 Units/mL does not indicate the absence of residual ovarian cancer because patients with histopathologic evidence of ovarian carcinoma may have  ASSAY VALUES within the range of healthy individuals.  Clinical decisions should not be based on a  assay value below 35 Units/mL.         Procedure Notes:              Assessment and Plan:      -continue azithromycin as prescribed by dentist     -Checking UA and vaginal nuswab today. Will wait on results before initiating any treatment recommendations.     -Continue routine use of vaginal conditioner 2-3 nights/ week, plus vaginal lubricant during intercourse    -We previously discussed ended again today consideration of Macrobid for post coital prophylaxis. Although, there is possibility of results being altered today due to pt recent abx use.     Diagnoses and all orders for this visit:    1. Acute vaginitis (Primary)  -     Urinalysis With Culture If Indicated -; Future  -     NuSwab VG+ - , Vagina; Future  -     NuSwab VG+ - Swab, Vagina    Pain assessment was performed today as a part of patient’s care.  For patients with pain related to surgery, gynecologic malignancy or cancer treatment, the plan is as noted in the assessment/plan.  For patients with pain not related to these issues, they are to seek any further needed care from a more appropriate provider, such as PCP.      Follow-up:   Return to clinic as scheduled       Electronically signed by SHARONA Trevino on 02/25/2025

## 2025-02-26 ENCOUNTER — TELEPHONE (OUTPATIENT)
Dept: GYNECOLOGIC ONCOLOGY | Facility: CLINIC | Age: 45
End: 2025-02-26
Payer: COMMERCIAL

## 2025-02-26 DIAGNOSIS — B37.31 VULVOVAGINAL CANDIDIASIS: Primary | ICD-10-CM

## 2025-02-27 RX ORDER — FLUCONAZOLE 150 MG/1
TABLET ORAL
Qty: 2 TABLET | Refills: 0 | Status: SHIPPED | OUTPATIENT
Start: 2025-02-27

## 2025-02-27 NOTE — TELEPHONE ENCOUNTER
Spoke with Edna on the phone.  Advised of normal urinalysis results.  Unfortunately, the lab was unable to run vaginal NuSwab test.  Gave her the option of returning to the office anytime today or tomorrow for recollect.  On examination, it does appear that she currently has a vaginal yeast infection which I am happy treating if she would rather or is unable to come into the office.  She would prefer to treat.  She still has 1 or 2 days left on current antibiotic therapy.  In the past there is required 2 doses of Diflucan to kick a vaginal yeast infection.  Instructed her to wait until the last dose of antibiotic is complete and then the next day began fluconazole therapy.  We discussed plan.  As her urine test was negative, I am unsure that intercourse is precipitating recurrent UTIs.  I do feel it is worthwhile to try a vaginal probiotic which she is agreeable to.  She will call me back if symptoms do not fully resolve.

## 2025-04-17 ENCOUNTER — HOSPITAL ENCOUNTER (EMERGENCY)
Facility: HOSPITAL | Age: 45
Discharge: HOME | End: 2025-04-17
Attending: EMERGENCY MEDICINE
Payer: COMMERCIAL

## 2025-04-17 ENCOUNTER — APPOINTMENT (OUTPATIENT)
Facility: HOSPITAL | Age: 45
End: 2025-04-17
Payer: COMMERCIAL

## 2025-04-17 VITALS
SYSTOLIC BLOOD PRESSURE: 143 MMHG | TEMPERATURE: 97.8 F | HEIGHT: 66 IN | DIASTOLIC BLOOD PRESSURE: 100 MMHG | RESPIRATION RATE: 18 BRPM | BODY MASS INDEX: 34.55 KG/M2 | OXYGEN SATURATION: 99 % | HEART RATE: 73 BPM | WEIGHT: 215 LBS
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
## 2025-04-17 DIAGNOSIS — M79.10 MYALGIA: ICD-10-CM

## 2025-04-17 PROCEDURE — 99284 EMERGENCY DEPT VISIT MOD MDM: CPT | Performed by: EMERGENCY MEDICINE

## 2025-04-17 PROCEDURE — 70450 CT HEAD/BRAIN W/O DYE: CPT

## 2025-04-17 RX ORDER — CYCLOBENZAPRINE HCL 5 MG
5 TABLET ORAL 3 TIMES DAILY PRN
Qty: 15 TABLET | Refills: 0 | Status: SHIPPED | OUTPATIENT
Start: 2025-04-17 | End: 2025-04-22

## 2025-04-17 RX ORDER — CYCLOBENZAPRINE HCL 10 MG
10 TABLET ORAL ONCE
Status: COMPLETED | OUTPATIENT
Start: 2025-04-17 | End: 2025-04-17

## 2025-04-17 RX ADMIN — CYCLOBENZAPRINE HYDROCHLORIDE 10 MG: 10 TABLET, FILM COATED ORAL at 23:38

## 2025-04-18 ENCOUNTER — PROCEDURE VISIT (OUTPATIENT)
Dept: NEUROLOGY | Facility: CLINIC | Age: 45
End: 2025-04-18
Payer: COMMERCIAL

## 2025-04-18 VITALS
SYSTOLIC BLOOD PRESSURE: 134 MMHG | WEIGHT: 215 LBS | OXYGEN SATURATION: 96 % | HEIGHT: 66 IN | HEART RATE: 74 BPM | BODY MASS INDEX: 34.55 KG/M2 | DIASTOLIC BLOOD PRESSURE: 88 MMHG

## 2025-04-18 DIAGNOSIS — G43.019 INTRACTABLE MIGRAINE WITHOUT AURA AND WITHOUT STATUS MIGRAINOSUS: Primary | ICD-10-CM

## 2025-04-18 NOTE — PROGRESS NOTES
Botox Procedure Note-    Current headache frequency: 2-3__ headaches days / month      The risks and benefits were discussed with the patient. The patient was given the opportunity to ask questions. Informed consent form was signed.     Onabotulinumtoxin A was reconstituted with 0.9% normal saline. All injections sites were prepped with alcohol swab. Approximately 5 units of botox were injected into each of the following sites  as per routine migraine botox injection PREEMPT protocol:  (2 injections), procerus (1 injection), frontalis (4 injections), temporalis (8 injections), occipitalis (6 injections) cervical, upper cervical paraspinals (4 injections),  trapezius (6 injections) and masseters (4 injections) for a total of 35 sites.  The patient tolerated the procedure well. There were no immediate complications. The patient will follow up in approximately 12 weeks for her next injection.     Total amount of onabotulinumtoxin A injected was 175 units with 35 units wasted.     Additional notes: Her headaches have been extremely well-controlled.  She continues to take nortriptyline 50 mg nightly that is also helping with her sleep as well as Ubrelvy as needed.  She also feels that her jaw shots helped relax her jaw tremendously at the last visit as she grinds her teeth at night for which she wears a mouthguard.  With regards to her stroke/vertebral and basilar artery dissection she needs to be on Eliquis for a few more months till she gets her repeat CT angiogram of the head and neck to see if her dissections have healed.  

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
## 2025-04-22 ENCOUNTER — PRIOR AUTHORIZATION (OUTPATIENT)
Dept: NEUROLOGY | Facility: CLINIC | Age: 45
End: 2025-04-22
Payer: COMMERCIAL

## 2025-04-22 NOTE — TELEPHONE ENCOUNTER
DONE AUTH ONLINE THROUGH [employer]  BOTOX 200 UNITS  DX:G43.719  CODE 54627    INSURANCE  ANTHEM  ID: [id number]    APPROVED  AUTH:[id number]  DATES:04/22/2025-04/22/2026      BOTOX WILL BE THROUGH MEDICAL BENEFIT       SHIPS THROUGH PHARMACY  BUY AND BILL

## 2025-05-29 ENCOUNTER — PATIENT MESSAGE (OUTPATIENT)
Dept: NEUROLOGY | Facility: CLINIC | Age: 45
End: 2025-05-29
Payer: COMMERCIAL

## 2025-06-19 ENCOUNTER — HOSPITAL ENCOUNTER (OUTPATIENT)
Dept: CT IMAGING | Facility: HOSPITAL | Age: 45
Discharge: HOME | End: 2025-06-19
Payer: COMMERCIAL

## 2025-06-19 DIAGNOSIS — I77.74 VERTEBRAL ARTERY DISSECTION: ICD-10-CM

## 2025-06-19 PROCEDURE — 25510000001 IOPAMIDOL PER 1 ML: Performed by: NURSE PRACTITIONER

## 2025-06-19 PROCEDURE — 70498 CT ANGIOGRAPHY NECK: CPT

## 2025-06-19 PROCEDURE — 70496 CT ANGIOGRAPHY HEAD: CPT

## 2025-06-19 RX ORDER — IOPAMIDOL 755 MG/ML
100 INJECTION, SOLUTION INTRAVASCULAR
Status: COMPLETED | OUTPATIENT
Start: 2025-06-19 | End: 2025-06-19

## 2025-06-19 RX ADMIN — IOPAMIDOL 75 ML: 755 INJECTION, SOLUTION INTRAVENOUS at 12:42

## 2025-07-16 ENCOUNTER — SPECIALTY PHARMACY (OUTPATIENT)
Dept: NEUROLOGY | Facility: CLINIC | Age: 45
End: 2025-07-16
Payer: COMMERCIAL

## 2025-07-22 ENCOUNTER — PROCEDURE VISIT (OUTPATIENT)
Dept: NEUROLOGY | Facility: CLINIC | Age: 45
End: 2025-07-22
Payer: COMMERCIAL

## 2025-07-22 VITALS
SYSTOLIC BLOOD PRESSURE: 132 MMHG | OXYGEN SATURATION: 96 % | DIASTOLIC BLOOD PRESSURE: 84 MMHG | WEIGHT: 215 LBS | BODY MASS INDEX: 34.55 KG/M2 | HEART RATE: 93 BPM | HEIGHT: 66 IN

## 2025-07-22 DIAGNOSIS — G43.019 INTRACTABLE MIGRAINE WITHOUT AURA AND WITHOUT STATUS MIGRAINOSUS: Primary | ICD-10-CM

## 2025-07-22 PROCEDURE — 64615 CHEMODENERV MUSC MIGRAINE: CPT | Performed by: PSYCHIATRY & NEUROLOGY

## 2025-07-22 RX ORDER — ASPIRIN 81 MG/1
81 TABLET, CHEWABLE ORAL DAILY
COMMUNITY

## 2025-07-22 NOTE — PROGRESS NOTES
Botox Procedure Note-    Current headache frequency: 2-3__ headaches days / month      The risks and benefits were discussed with the patient. The patient was given the opportunity to ask questions. Informed consent form was signed.     Onabotulinumtoxin A was reconstituted with 0.9% normal saline. All injections sites were prepped with alcohol swab. Approximately 5 units of botox were injected into each of the following sites  as per routine migraine botox injection PREEMPT protocol: procerus (1 injection), frontalis (4 injections), temporalis (8 injections), occipitalis (6 injections) cervical, upper cervical paraspinals (4 injections),  trapezius (6 injections) and masseters (4 injections) for a total of 35 sites.  The patient tolerated the procedure well. There were no immediate complications. The patient will follow up in approximately 12 weeks for her next injection.     Total amount of onabotulinumtoxin A injected was 165 units with 35 units wasted.     Additional notes: Her headaches have been extremely well-controlled.  I avoided giving her 2 shots in the corrugators today as she has complained of some eyelid drooping.  She continues to take nortriptyline 50 mg nightly that is also helping with her sleep as well as Ubrelvy as needed.  She also feels that her jaw shots helped relax her jaw tremendously at the last visit as she grinds her teeth at night for which she wears a mouthguard.  With regards to her stroke/vertebral and basilar artery dissection she has stopped taking Eliquis as a repeat CT angiogram of the head and neck showed resolution of the left vertebral artery dissection and no evidence of thrombus.      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

## 2025-07-23 ENCOUNTER — HOSPITAL ENCOUNTER (OUTPATIENT)
Dept: BONE DENSITY | Facility: HOSPITAL | Age: 45
Discharge: HOME | End: 2025-07-23
Admitting: NURSE PRACTITIONER
Payer: COMMERCIAL

## 2025-07-23 DIAGNOSIS — Z78.0 POST-MENOPAUSAL: ICD-10-CM

## 2025-07-23 PROCEDURE — 77080 DXA BONE DENSITY AXIAL: CPT
